# Patient Record
Sex: FEMALE | Race: WHITE | NOT HISPANIC OR LATINO | Employment: OTHER | URBAN - METROPOLITAN AREA
[De-identification: names, ages, dates, MRNs, and addresses within clinical notes are randomized per-mention and may not be internally consistent; named-entity substitution may affect disease eponyms.]

---

## 2017-02-06 ENCOUNTER — RESULTS ENCOUNTER (OUTPATIENT)
Dept: INTERNAL MEDICINE | Facility: CLINIC | Age: 67
End: 2017-02-06

## 2017-02-06 DIAGNOSIS — Z79.899 MEDICATION MANAGEMENT: Primary | ICD-10-CM

## 2017-02-06 DIAGNOSIS — Z00.00 ENCOUNTER FOR MEDICARE ANNUAL WELLNESS EXAM: ICD-10-CM

## 2017-02-06 DIAGNOSIS — Z79.899 MEDICATION MANAGEMENT: ICD-10-CM

## 2017-02-06 LAB
CHOLEST SERPL-MCNC: 254 MG/DL (ref 0–200)
HDLC SERPL-MCNC: 105 MG/DL (ref 40–60)
LDLC SERPL CALC-MCNC: 137 MG/DL (ref 0–100)
TRIGL SERPL-MCNC: 61 MG/DL (ref 0–150)
VLDLC SERPL CALC-MCNC: 12.2 MG/DL (ref 5–40)

## 2017-02-22 ENCOUNTER — OFFICE VISIT (OUTPATIENT)
Dept: INTERNAL MEDICINE | Facility: CLINIC | Age: 67
End: 2017-02-22

## 2017-02-22 VITALS
RESPIRATION RATE: 16 BRPM | WEIGHT: 158 LBS | HEART RATE: 60 BPM | SYSTOLIC BLOOD PRESSURE: 124 MMHG | DIASTOLIC BLOOD PRESSURE: 76 MMHG | HEIGHT: 68 IN | TEMPERATURE: 97.7 F | BODY MASS INDEX: 23.95 KG/M2

## 2017-02-22 VITALS
RESPIRATION RATE: 16 BRPM | HEIGHT: 68 IN | OXYGEN SATURATION: 97 % | WEIGHT: 158 LBS | SYSTOLIC BLOOD PRESSURE: 124 MMHG | HEART RATE: 60 BPM | TEMPERATURE: 97.7 F | DIASTOLIC BLOOD PRESSURE: 76 MMHG | BODY MASS INDEX: 23.95 KG/M2

## 2017-02-22 DIAGNOSIS — Z23 NEED FOR VACCINATION: ICD-10-CM

## 2017-02-22 DIAGNOSIS — M79.7 FIBROMYALGIA: ICD-10-CM

## 2017-02-22 DIAGNOSIS — Z00.00 MEDICARE ANNUAL WELLNESS VISIT, INITIAL: Primary | ICD-10-CM

## 2017-02-22 DIAGNOSIS — K21.9 GASTROESOPHAGEAL REFLUX DISEASE, ESOPHAGITIS PRESENCE NOT SPECIFIED: Primary | ICD-10-CM

## 2017-02-22 DIAGNOSIS — M81.0 OSTEOPOROSIS: ICD-10-CM

## 2017-02-22 DIAGNOSIS — M32.9 SLE (SYSTEMIC LUPUS ERYTHEMATOSUS) (HCC): ICD-10-CM

## 2017-02-22 DIAGNOSIS — Z11.59 NEED FOR HEPATITIS C SCREENING TEST: ICD-10-CM

## 2017-02-22 PROCEDURE — 90471 IMMUNIZATION ADMIN: CPT | Performed by: INTERNAL MEDICINE

## 2017-02-22 PROCEDURE — G0438 PPPS, INITIAL VISIT: HCPCS | Performed by: INTERNAL MEDICINE

## 2017-02-22 PROCEDURE — 99214 OFFICE O/P EST MOD 30 MIN: CPT | Performed by: INTERNAL MEDICINE

## 2017-02-22 PROCEDURE — 90670 PCV13 VACCINE IM: CPT | Performed by: INTERNAL MEDICINE

## 2017-02-22 RX ORDER — MULTIVIT-MIN/IRON/FOLIC ACID/K 18-600-40
1000 CAPSULE ORAL DAILY
COMMUNITY

## 2017-02-22 RX ORDER — NAPROXEN SODIUM 220 MG
220 TABLET ORAL 2 TIMES DAILY PRN
COMMUNITY
End: 2018-04-06

## 2017-02-22 NOTE — PROGRESS NOTES
Subjective   Melany Lloyd is a 66 y.o. female.     Chief Complaint   Patient presents with   • Fibromyalgia   • Lupus   • Osteoporosis         Fibromyalgia   This is a chronic problem. The current episode started more than 1 year ago. The problem occurs constantly. The problem has been unchanged. Associated symptoms include chest pain. Pertinent negatives include no abdominal pain, chills, coughing, fatigue, fever, nausea or vomiting. The treatment provided moderate relief.   Lupus   This is a chronic problem. The current episode started more than 1 year ago. The problem occurs constantly. The problem has been resolved. Associated symptoms include chest pain. Pertinent negatives include no abdominal pain, chills, coughing, fatigue, fever, nausea or vomiting. Nothing aggravates the symptoms. The treatment provided significant relief.   Osteoporosis   This is a chronic problem. The current episode started more than 1 year ago. The problem occurs constantly. The problem has been unchanged. Associated symptoms include chest pain. Pertinent negatives include no abdominal pain, chills, coughing, fatigue, fever, nausea or vomiting.        The following portions of the patient's history were reviewed and updated as appropriate: allergies, current medications, past social history and problem list.    Outpatient Prescriptions Marked as Taking for the 2/22/17 encounter (Office Visit) with Jeramie Kinsey MD   Medication Sig Dispense Refill   • acetaminophen (TYLENOL) 650 MG 8 hr tablet Take 650 mg by mouth every 8 (eight) hours as needed for mild pain (1-3).     • Cholecalciferol (VITAMIN D) 2000 UNITS capsule Take 1,000 Units by mouth Daily.     • cycloSPORINE (RESTASIS) 0.05 % ophthalmic emulsion 1 drop 2 (two) times a day.     • diclofenac (VOLTAREN) 1 % gel gel Apply 4 g topically as needed.     • Flaxseed, Linseed, (FLAX SEED OIL PO) Take 2 capsules by mouth daily.     • hydroxychloroquine (PLAQUENIL) 200 MG tablet Take  200 mg by mouth 2 (two) times a day.     • methocarbamol (ROBAXIN) 500 MG tablet Take 500 mg by mouth 4 (four) times a day as needed for muscle spasms (1-2 tablets 4 times daily).     • naproxen sodium (ALEVE) 220 MG tablet Take 220 mg by mouth 2 (Two) Times a Day As Needed for mild pain (1-3).     • Omega-3 Fatty Acids (FISH OIL PO) Take 1 tablet by mouth Daily.     • [DISCONTINUED] naproxen (NAPROSYN) 375 MG tablet Take 375 mg by mouth 2 (two) times a day.         Review of Systems   Constitutional: Negative for chills, fatigue and fever.   Respiratory: Positive for shortness of breath. Negative for cough and wheezing.    Cardiovascular: Positive for chest pain and palpitations. Negative for leg swelling.   Gastrointestinal: Negative for abdominal pain, constipation, diarrhea, nausea and vomiting.       Objective   Vitals:    02/22/17 0905   BP: 124/76   Pulse: 60   Resp: 16   Temp: 97.7 °F (36.5 °C)   SpO2: 97%      Last Weight    02/22/17  0905   Weight: 158 lb (71.7 kg)    [unfilled]  Body mass index is 24.02 kg/(m^2).      Physical Exam   Constitutional: She appears well-developed and well-nourished. No distress.   HENT:   Head: Normocephalic and atraumatic.   Neck: Carotid bruit is not present. No thyromegaly present.   Cardiovascular: Normal rate, regular rhythm, normal heart sounds and intact distal pulses.  Exam reveals no gallop.    No murmur heard.  Pulmonary/Chest: Effort normal and breath sounds normal. No respiratory distress. She has no wheezes. She has no rales.   Abdominal: Soft. Bowel sounds are normal. She exhibits no mass. There is no tenderness. There is no guarding.         Problem List Items Addressed This Visit        Digestive    GERD (gastroesophageal reflux disease) - Primary       Musculoskeletal and Integument    Osteoporosis    Fibromyalgia       Immune and Lymphatic    SLE (systemic lupus erythematosus)      Other Visit Diagnoses     Need for hepatitis C screening test             Assessment/Plan   Patient has had problems with nocturnal palpitations for a few seconds over the years.  Some associated shortness of breath with these.  Usually in bed or watching TV..  Short lived.  Usually just a few seconds.  She had an EKG and a stress echo both of which were normal.  O2 sat last visit was 97%.  Chest x-ray was normal.  She does have bibasilar rales about a quarter of the way up that are somewhat fibrotic.  This could easily be the cause of her dyspnea and that will need to be evaluated next.  We got a CT of the chest, final resolution which was normal.  Symptoms could be anxiety related.  Overall the symptoms are better and she would just as soon a thoracotomy at this point.  She's due for Prevnar this year.  She'll get a Pneumovax next year.  Due for Medicare wellness exam.  CT of chest did show a 1 cm splenic artery aneurysm.  We'll repeat a CT of the chest, no contrast, September 2017.  Lab work is done by rheumatology.  Lipids today are excellent.  Very high HDL cholesterol.  Check C antibody today.  Follow up one year.           Dragon disclaimer:   Much of this encounter note is an electronic transcription/translation of spoken language to printed text. The electronic translation of spoken language may permit erroneous, or at times, nonsensical words or phrases to be inadvertently transcribed; Although I have reviewed the note for such errors, some may still exist.

## 2017-02-22 NOTE — PROGRESS NOTES
QUICK REFERENCE INFORMATION:  The ABCs of the Annual Wellness Visit    Welcome to Medicare Visit    HEALTH RISK ASSESSMENT    Recent Hospitalizations:  No recent hospitalization(s)..      Current Medical Providers:  Patient Care Team:  Jeramie Kinsey MD as PCP - General (Internal Medicine)  No Known Provider as PCP - Family Medicine      Smoking Status:  History   Smoking Status   • Never Smoker   Smokeless Tobacco   • Not on file       Alcohol Consumption:  History   Alcohol use Not on file     Comment: Occasional.        Depression Screen:   PHQ-9 Depression Screening 2/22/2017   Little interest or pleasure in doing things 3   Feeling down, depressed, or hopeless 2   Trouble falling or staying asleep, or sleeping too much 1   Feeling tired or having little energy 1   Poor appetite or overeating 1   Feeling bad about yourself - or that you are a failure or have let yourself or your family down 1   Trouble concentrating on things, such as reading the newspaper or watching television 2   Moving or speaking so slowly that other people could have noticed. Or the opposite - being so fidgety or restless that you have been moving around a lot more than usual 0   Thoughts that you would be better off dead, or of hurting yourself in some way 0   PHQ-9 Total Score 11   If you checked off any problems, how difficult have these problems made it for you to do your work, take care of things at home, or get along with other people? Somewhat difficult       Health Habits and Functional and Cognitive Screening:  Functional & Cognitive Status 2/22/2017   Do you have difficulty preparing food and eating? Yes   Do you have difficulty bathing yourself? No   Do you have difficulty getting dressed? No   Do you have difficulty using the toilet? No   Do you have difficulty moving around from place to place? No   In the past year have you fallen or experienced a near fall? No   Do you need help using the phone?  No   Are you deaf or do you  have serious difficulty hearing?  No   Do you need help with transportation? No   Do you need help shopping? No   Do you need help preparing meals?  No   Do you need help with housework?  No   Do you need help with laundry? No   Do you need help taking your medications? No   Do you need help managing money? No   Do you have difficulty concentrating, remembering or making decisions? Yes                Does the patient have evidence of cognitive impairment? No    Asprin use counseling?no    Finger Rub Hearing Test (right ear):passed  Finger Rub Hearing Test (left ear):passed      Recent Lab Results:  CMP:     Lipid Panel:  Lab Results   Component Value Date    CHLPL 254 (H) 02/06/2017    TRIG 61 02/06/2017     (H) 02/06/2017    VLDL 12.2 02/06/2017     (H) 02/06/2017     LDL:     HbA1c:     Urine Microalbumin:     Visual Acuity:  No exam data present    Age-appropriate Screening Schedule:  Refer to the list below for future screening recommendations based on patient's age, sex and/or medical conditions. Orders for these recommended tests are listed in the plan section. The patient has been provided with a written plan.    Health Maintenance   Topic Date Due   • DXA SCAN  08/19/2016   • MAMMOGRAM  01/28/2018   • COLONOSCOPY  06/04/2019   • TDAP/TD VACCINES (2 - Td) 02/04/2026   • INFLUENZA VACCINE  Addressed   • ZOSTER VACCINE  Completed   • PAP SMEAR  Excluded        Subjective   History of Present Illness    Melany Lloyd is a 66 y.o. female an established patient presenting for a Welcome to Medicare Visit. In addition, we addressed the following health issues: none.    The following portions of the patient's history were reviewed and updated as appropriate: allergies, current medications, past family history, past medical history, past social history, past surgical history and problem list.    Outpatient Medications Prior to Visit   Medication Sig Dispense Refill   • acetaminophen (TYLENOL) 650 MG 8  hr tablet Take 650 mg by mouth every 8 (eight) hours as needed for mild pain (1-3).     • Cholecalciferol (VITAMIN D) 2000 UNITS capsule Take 1,000 Units by mouth Daily.     • cycloSPORINE (RESTASIS) 0.05 % ophthalmic emulsion 1 drop 2 (two) times a day.     • diclofenac (VOLTAREN) 1 % gel gel Apply 4 g topically as needed.     • Flaxseed, Linseed, (FLAX SEED OIL PO) Take 2 capsules by mouth daily.     • hydroxychloroquine (PLAQUENIL) 200 MG tablet Take 200 mg by mouth 2 (two) times a day.     • methocarbamol (ROBAXIN) 500 MG tablet Take 500 mg by mouth 4 (four) times a day as needed for muscle spasms (1-2 tablets 4 times daily).     • naproxen sodium (ALEVE) 220 MG tablet Take 220 mg by mouth 2 (Two) Times a Day As Needed for mild pain (1-3).     • Omega-3 Fatty Acids (FISH OIL PO) Take 1 tablet by mouth Daily.     • Calcium Citrate-Vitamin D (CALCITRATE/VITAMIN D PO) Take 1 tablet by mouth Daily.     • naproxen (NAPROSYN) 375 MG tablet Take 375 mg by mouth 2 (two) times a day.     • Polyethylene Glycol 3350 (MIRALAX PO) Take  by mouth as needed.     • tiZANidine (ZANAFLEX) 4 MG tablet Take 4 mg by mouth as needed for muscle spasms.       No facility-administered medications prior to visit.        Patient Active Problem List   Diagnosis   • GERD (gastroesophageal reflux disease)   • SLE (systemic lupus erythematosus)   • Osteoporosis   • DDD (degenerative disc disease), lumbosacral   • Precordial pain   • Dyspnea on exertion   • Fibromyalgia       Advanced Care Planning:  has an advanced directive - a copy HAS NOT been provided. Have asked the patient to send this to us to add to record    Identification of Risk Factors:  Risk factors include: chronic pain.    Review of Systems    Compared to one year ago, the patient feels her physical health is the same.  Compared to one year ago, the patient feels her mental health is better.    Objective    Physical Exam    Vitals:    02/22/17 1020   BP: 124/76   BP Location:  "Left arm   Patient Position: Sitting   Pulse: 60   Resp: 16   Temp: 97.7 °F (36.5 °C)   Weight: 158 lb (71.7 kg)   Height: 68\" (172.7 cm)   PainSc:   2       Body mass index is 24.02 kg/(m^2).  Discussed the patient's BMI with her. The BMI is in the acceptable range.    Procedure   Procedures       Assessment/Plan   Patient Self-Management and Personalized Health Advice    ·     Visit Diagnoses:    ICD-10-CM ICD-9-CM   1. Medicare annual wellness visit, initial Z00.00 V70.0       No orders of the defined types were placed in this encounter.      Outpatient Encounter Prescriptions as of 2/22/2017   Medication Sig Dispense Refill   • acetaminophen (TYLENOL) 650 MG 8 hr tablet Take 650 mg by mouth every 8 (eight) hours as needed for mild pain (1-3).     • Cholecalciferol (VITAMIN D) 2000 UNITS capsule Take 1,000 Units by mouth Daily.     • cycloSPORINE (RESTASIS) 0.05 % ophthalmic emulsion 1 drop 2 (two) times a day.     • diclofenac (VOLTAREN) 1 % gel gel Apply 4 g topically as needed.     • Flaxseed, Linseed, (FLAX SEED OIL PO) Take 2 capsules by mouth daily.     • hydroxychloroquine (PLAQUENIL) 200 MG tablet Take 200 mg by mouth 2 (two) times a day.     • methocarbamol (ROBAXIN) 500 MG tablet Take 500 mg by mouth 4 (four) times a day as needed for muscle spasms (1-2 tablets 4 times daily).     • naproxen sodium (ALEVE) 220 MG tablet Take 220 mg by mouth 2 (Two) Times a Day As Needed for mild pain (1-3).     • Omega-3 Fatty Acids (FISH OIL PO) Take 1 tablet by mouth Daily.     • [DISCONTINUED] Calcium Citrate-Vitamin D (CALCITRATE/VITAMIN D PO) Take 1 tablet by mouth Daily.     • [DISCONTINUED] naproxen (NAPROSYN) 375 MG tablet Take 375 mg by mouth 2 (two) times a day.     • [DISCONTINUED] Polyethylene Glycol 3350 (MIRALAX PO) Take  by mouth as needed.     • [DISCONTINUED] tiZANidine (ZANAFLEX) 4 MG tablet Take 4 mg by mouth as needed for muscle spasms.       No facility-administered encounter medications on file as " of 2/22/2017.        Reviewed use of high risk medication in the elderly: not applicable  Reviewed for potential of harmful drug interactions in the elderly: not applicable    Follow Up:  No Follow-up on file.     An After Visit Summary and PPPS with all of these plans were given to the patient.

## 2017-02-23 LAB — HCV AB S/CO SERPL IA: <0.1 S/CO RATIO (ref 0–0.9)

## 2017-09-12 ENCOUNTER — TELEPHONE (OUTPATIENT)
Dept: INTERNAL MEDICINE | Facility: CLINIC | Age: 67
End: 2017-09-12

## 2017-09-12 DIAGNOSIS — I72.8 SPLENIC ARTERY ANEURYSM (HCC): Primary | ICD-10-CM

## 2017-09-18 ENCOUNTER — HOSPITAL ENCOUNTER (OUTPATIENT)
Dept: CT IMAGING | Facility: HOSPITAL | Age: 67
Discharge: HOME | End: 2017-09-18
Attending: INTERNAL MEDICINE | Admitting: INTERNAL MEDICINE

## 2017-09-18 PROCEDURE — 71250 CT THORAX DX C-: CPT

## 2017-11-14 ENCOUNTER — TRANSCRIBE ORDERS (OUTPATIENT)
Dept: ADMINISTRATIVE | Facility: HOSPITAL | Age: 67
End: 2017-11-14

## 2017-11-14 DIAGNOSIS — M25.511 RIGHT SHOULDER PAIN, UNSPECIFIED CHRONICITY: Primary | ICD-10-CM

## 2017-11-28 ENCOUNTER — HOSPITAL ENCOUNTER (OUTPATIENT)
Dept: GENERAL RADIOLOGY | Facility: HOSPITAL | Age: 67
Discharge: HOME | End: 2017-11-28
Attending: ORTHOPAEDIC SURGERY | Admitting: ORTHOPAEDIC SURGERY

## 2017-11-28 ENCOUNTER — HOSPITAL ENCOUNTER (OUTPATIENT)
Dept: MRI IMAGING | Facility: HOSPITAL | Age: 67
Discharge: HOME | End: 2017-11-28
Attending: ORTHOPAEDIC SURGERY

## 2017-11-28 DIAGNOSIS — M25.511 RIGHT SHOULDER PAIN, UNSPECIFIED CHRONICITY: ICD-10-CM

## 2017-11-28 PROCEDURE — 77002 NEEDLE LOCALIZATION BY XRAY: CPT

## 2017-11-28 PROCEDURE — 73222 MRI JOINT UPR EXTREM W/DYE: CPT

## 2017-11-28 PROCEDURE — A9577 INJ MULTIHANCE: HCPCS | Performed by: RADIOLOGY

## 2017-11-28 PROCEDURE — 0 IOPAMIDOL 61 % SOLUTION: Performed by: RADIOLOGY

## 2017-11-28 PROCEDURE — 0 GADOBENATE DIMEGLUMINE 529 MG/ML SOLUTION: Performed by: RADIOLOGY

## 2017-11-28 RX ORDER — LIDOCAINE HYDROCHLORIDE 10 MG/ML
10 INJECTION, SOLUTION INFILTRATION; PERINEURAL ONCE
Status: COMPLETED | OUTPATIENT
Start: 2017-11-28 | End: 2017-11-28

## 2017-11-28 RX ADMIN — LIDOCAINE HYDROCHLORIDE 3 ML: 10 INJECTION, SOLUTION INFILTRATION; PERINEURAL at 09:19

## 2017-11-28 RX ADMIN — GADOBENATE DIMEGLUMINE 0.05 ML: 529 INJECTION, SOLUTION INTRAVENOUS at 09:19

## 2017-11-28 RX ADMIN — IOPAMIDOL 2 ML: 612 INJECTION, SOLUTION INTRAVENOUS at 09:19

## 2018-01-05 ENCOUNTER — HOSPITAL ENCOUNTER (OUTPATIENT)
Dept: CARDIOLOGY | Facility: HOSPITAL | Age: 68
Discharge: HOME | End: 2018-01-05
Attending: ORTHOPAEDIC SURGERY | Admitting: ORTHOPAEDIC SURGERY

## 2018-01-05 ENCOUNTER — HOSPITAL ENCOUNTER (OUTPATIENT)
Dept: GENERAL RADIOLOGY | Facility: HOSPITAL | Age: 68
Discharge: HOME | End: 2018-01-05
Attending: ORTHOPAEDIC SURGERY

## 2018-01-05 ENCOUNTER — LAB (OUTPATIENT)
Dept: LAB | Facility: HOSPITAL | Age: 68
End: 2018-01-05
Attending: ORTHOPAEDIC SURGERY

## 2018-01-05 ENCOUNTER — TRANSCRIBE ORDERS (OUTPATIENT)
Dept: ADMINISTRATIVE | Facility: HOSPITAL | Age: 68
End: 2018-01-05

## 2018-01-05 DIAGNOSIS — M25.511 RIGHT SHOULDER PAIN, UNSPECIFIED CHRONICITY: ICD-10-CM

## 2018-01-05 DIAGNOSIS — Z01.818 PRE-OP TESTING: ICD-10-CM

## 2018-01-05 DIAGNOSIS — Z01.818 PRE-OP TESTING: Primary | ICD-10-CM

## 2018-01-05 LAB
ANION GAP SERPL CALCULATED.3IONS-SCNC: 11.7 MMOL/L
BUN BLD-MCNC: 18 MG/DL (ref 8–23)
BUN/CREAT SERPL: 32.7 (ref 7–25)
CALCIUM SPEC-SCNC: 9.7 MG/DL (ref 8.6–10.5)
CHLORIDE SERPL-SCNC: 100 MMOL/L (ref 98–107)
CO2 SERPL-SCNC: 29.3 MMOL/L (ref 22–29)
CREAT BLD-MCNC: 0.55 MG/DL (ref 0.57–1)
DEPRECATED RDW RBC AUTO: 46.9 FL (ref 37–54)
ERYTHROCYTE [DISTWIDTH] IN BLOOD BY AUTOMATED COUNT: 13.6 % (ref 11.7–13)
GFR SERPL CREATININE-BSD FRML MDRD: 111 ML/MIN/1.73
GLUCOSE BLD-MCNC: 88 MG/DL (ref 65–99)
HCT VFR BLD AUTO: 40 % (ref 35.6–45.5)
HGB BLD-MCNC: 13.5 G/DL (ref 11.9–15.5)
MCH RBC QN AUTO: 31.2 PG (ref 26.9–32)
MCHC RBC AUTO-ENTMCNC: 33.8 G/DL (ref 32.4–36.3)
MCV RBC AUTO: 92.4 FL (ref 80.5–98.2)
PLATELET # BLD AUTO: 292 10*3/MM3 (ref 140–500)
PMV BLD AUTO: 9.1 FL (ref 6–12)
POTASSIUM BLD-SCNC: 4.8 MMOL/L (ref 3.5–5.2)
RBC # BLD AUTO: 4.33 10*6/MM3 (ref 3.9–5.2)
SODIUM BLD-SCNC: 141 MMOL/L (ref 136–145)
WBC NRBC COR # BLD: 5.08 10*3/MM3 (ref 4.5–10.7)

## 2018-01-05 PROCEDURE — 80048 BASIC METABOLIC PNL TOTAL CA: CPT

## 2018-01-05 PROCEDURE — 93010 ELECTROCARDIOGRAM REPORT: CPT | Performed by: INTERNAL MEDICINE

## 2018-01-05 PROCEDURE — 36415 COLL VENOUS BLD VENIPUNCTURE: CPT

## 2018-01-05 PROCEDURE — 85027 COMPLETE CBC AUTOMATED: CPT

## 2018-01-05 PROCEDURE — 71046 X-RAY EXAM CHEST 2 VIEWS: CPT

## 2018-01-05 PROCEDURE — 93005 ELECTROCARDIOGRAM TRACING: CPT | Performed by: ORTHOPAEDIC SURGERY

## 2018-01-19 ENCOUNTER — TELEPHONE (OUTPATIENT)
Dept: INTERNAL MEDICINE | Facility: CLINIC | Age: 68
End: 2018-01-19

## 2018-01-19 DIAGNOSIS — R91.1 NODULE OF RIGHT LUNG: ICD-10-CM

## 2018-01-19 DIAGNOSIS — I72.8 ANEURYSM OF SPLENIC ARTERY (HCC): Primary | ICD-10-CM

## 2018-02-05 ENCOUNTER — TREATMENT (OUTPATIENT)
Dept: PHYSICAL THERAPY | Facility: CLINIC | Age: 68
End: 2018-02-05

## 2018-02-05 ENCOUNTER — TRANSCRIBE ORDERS (OUTPATIENT)
Dept: PHYSICAL THERAPY | Facility: CLINIC | Age: 68
End: 2018-02-05

## 2018-02-05 DIAGNOSIS — M25.511 PAIN OF RIGHT SHOULDER REGION: ICD-10-CM

## 2018-02-05 DIAGNOSIS — Z98.890 S/P RIGHT ROTATOR CUFF REPAIR: Primary | ICD-10-CM

## 2018-02-05 DIAGNOSIS — Z98.890 S/P SHOULDER SURGERY: Primary | ICD-10-CM

## 2018-02-05 PROCEDURE — 97161 PT EVAL LOW COMPLEX 20 MIN: CPT | Performed by: PHYSICAL THERAPIST

## 2018-02-05 PROCEDURE — 97110 THERAPEUTIC EXERCISES: CPT | Performed by: PHYSICAL THERAPIST

## 2018-02-05 PROCEDURE — G8985 CARRY GOAL STATUS: HCPCS | Performed by: PHYSICAL THERAPIST

## 2018-02-05 PROCEDURE — G8984 CARRY CURRENT STATUS: HCPCS | Performed by: PHYSICAL THERAPIST

## 2018-02-05 NOTE — PROGRESS NOTES
Physical Therapy Initial Evaluation and Plan of Care    Patient: Melany Lloyd   : 1950  Diagnosis/ICD-10 Code:  S/P right rotator cuff repair [Z98.890]  Referring practitioner: Romeo Walker MD    Subjective Evaluation    History of Present Illness  Date of surgery: 2018  Mechanism of injury: (R) Shoulder RTC Repair by Romeo Walker MD.  Came out of the sling last week but still sleep with the sling on.  Doing well since Sx but haven't been     Injured the (R) shdr throwing a ball for the dog over time in the last year ().        Procedures:   1. Arthoscopic Repair of (R) RTC  2. Acromioplasty  3. Thierry  4. Debridement of labral tears, anterior and posterior and release of the long head of the biceps  5. Bone Graft      Occupation:  Retired - PTA  Activities:  Dog, Housework, Gardening, walk 3x wk, watch 25 lb grandson 1-2 days a wk  PLOF: Independent  Medical Hx Reviewed.      Quality of life: good    Pain  Current pain rating: 3  At best pain ratin  At worst pain ratin  Location: (R) Shoulder   Quality: dull ache  Relieving factors: medications, ice and support  Aggravating factors: sleeping (Pt on restrictions for lifting no heavier then a penicl )  Progression: improved    Social Support  Lives in: multiple-level home  Lives with: spouse    Hand dominance: right             Objective       Passive Range of Motion     Right Shoulder   Flexion: 50 degrees   Abduction: 59 degrees          Assessment & Plan     Assessment  Impairments: abnormal or restricted ROM, impaired physical strength and pain with function  Assessment details: Pt presents to PT with symptoms consistent with (R) Rotator Cuff Repair (Listed procedure in Subjective).  Pt would benefit from skilled PT intervention to address the deficits noted.   Prognosis: good  Functional Limitations: lifting, sleeping, pushing, uncomfortable because of pain, reaching behind back and reaching overhead  Goals  Plan Goals:  SHORT TERM GOALS: 4-8 weeks  1. Patient to be compliant with HEP and no diff. with sleeping  2. Increased (R) UE strength to 4-/5 with no pain> 4/10 to allow for household and work activities.   3. Pt to exhibit (R) shoulder active flexion / ABD to 135° in standing/sitting to assist with reaching overhead with less pain  4. Pt demonstrates improved posture in sitting and standing with minimal-no cues during treatment session    LONG TERM GOALS: 3-4 months  1. Pt score <35% perceived disability on DASH   2. Pt. to exhibit (R) shoulder AROM to WFL (> 160° flex/abd. to allow for reaching overhead and behind back without pain limiting function  3. Pt to exhibit 4+/5 UE strength to allow for pushing/pulling and lifting >5 #to occur with pain <2/10  4. Pt able to reach overhead and lift 10# (B) x 10 to allow for return to doing work around home.       Plan  Therapy options: will be seen for skilled physical therapy services  Planned modality interventions: cryotherapy, electrical stimulation/Russian stimulation, iontophoresis, TENS, thermotherapy (hydrocollator packs) and ultrasound  Other planned modality interventions: Dry Needling  Planned therapy interventions: abdominal trunk stabilization, ADL retraining, flexibility, body mechanics training, home exercise program, functional ROM exercises, joint mobilization, manual therapy, neuromuscular re-education, postural training, soft tissue mobilization, spinal/joint mobilization, strengthening, stretching and therapeutic activities  Frequency: 1-3x.  Duration in visits: 20  Treatment plan discussed with: patient        Manual Therapy:         mins  78356;  Therapeutic Exercise:    10     mins  04616;     Neuromuscular Wm:        mins  66781;    Therapeutic Activity:          mins  29074;     Gait Training:           mins  29113;     Ultrasound:          mins  69489;    Electrical Stimulation:         mins  60257 ( );  Dry Needling          mins self-pay    Timed  Treatment:   10   mins   Total Treatment:     60   mins    PT SIGNATURE: Fede Winter, MISAEL   [place] Lic #[id number]    DATE TREATMENT INITIATED: 2/5/2018    Medicare Initial Certification   Certification Period: 5/6/2018  I certify that the therapy services are furnished while this patient is under my care.  The services outlined above are required by this patient, and will be reviewed every 90 days.     PHYSICIAN: Romeo Walker MD      DATE:     Please sign and return via fax to [phone].. Thank you, [facility].

## 2018-02-08 ENCOUNTER — TREATMENT (OUTPATIENT)
Dept: PHYSICAL THERAPY | Facility: CLINIC | Age: 68
End: 2018-02-08

## 2018-02-08 DIAGNOSIS — Z98.890 S/P RIGHT ROTATOR CUFF REPAIR: Primary | ICD-10-CM

## 2018-02-08 DIAGNOSIS — M25.511 PAIN OF RIGHT SHOULDER REGION: ICD-10-CM

## 2018-02-08 PROCEDURE — 97140 MANUAL THERAPY 1/> REGIONS: CPT | Performed by: PHYSICAL THERAPIST

## 2018-02-08 PROCEDURE — 97110 THERAPEUTIC EXERCISES: CPT | Performed by: PHYSICAL THERAPIST

## 2018-02-08 NOTE — PROGRESS NOTES
Physical Therapy Daily Progress Note  Visit: 2    Melany Lloyd reports: My shdr is better and has felt better since the 1st visit.  My (R) forearm is sore from the ball squeeze.  (Pt's  attended PT with the pt.)    Subjective     Objective       Passive Range of Motion   Left Shoulder   Flexion: 95 degrees      See Exercise, Manual, and Modality Logs for complete treatment.     Reviewed HEP.    Pt's  pulled the PT (myself) aside in to private area and questioned if the PT was comfortable treating Dr. Romeo Walker' patients.  He was informed that there was no issue or concern.      Assessment & Plan     Assessment  Assessment details: Pt demos (I) w/ HEP.  Review of all HEP with some VC's for correction.  Pt yasmeen the MT provided Rx well.      Plan  Plan details: Progress ROM / strengthening / stabilization / functional activity as tolerated and appropriate for the pt's Sx rehab.                   Manual Therapy:    12     mins  10948;  Therapeutic Exercise:    13     mins  14067;     Neuromuscular Wm:        mins  23704;    Therapeutic Activity:          mins  18214;     Gait Training:           mins  68542;     Ultrasound:          mins  68789;    Electrical Stimulation:         mins  09884 ( );  Dry Needling          mins self-pay    Timed Treatment:   25   mins   Total Treatment:     25   mins    Fede Winter PT  [place] Lic. # [id number]  Physical Therapist

## 2018-02-12 ENCOUNTER — TREATMENT (OUTPATIENT)
Dept: PHYSICAL THERAPY | Facility: CLINIC | Age: 68
End: 2018-02-12

## 2018-02-12 DIAGNOSIS — M25.511 PAIN OF RIGHT SHOULDER REGION: ICD-10-CM

## 2018-02-12 DIAGNOSIS — Z98.890 S/P RIGHT ROTATOR CUFF REPAIR: Primary | ICD-10-CM

## 2018-02-12 PROCEDURE — 97110 THERAPEUTIC EXERCISES: CPT | Performed by: PHYSICAL THERAPIST

## 2018-02-12 NOTE — PROGRESS NOTES
Physical Therapy Daily Progress Note  Visit: 3    Melany Lloyd reports: My R shdr is sore.  I tried to sleep in my bed last night with no wedge in my sling.    Subjective     Objective   See Exercise, Manual, and Modality Logs for complete treatment.     Reviewed sleeping on a incline w/ sling to help the shdr not be irritated with sleeping.    Assessment & Plan     Assessment  Assessment details: PT was sore with new exercises but yasmeen Rx well while in the clinic.  Instructed to only do new exercise 1 more time today and progress to 2x tomorrow.      Plan  Plan details: Progress ROM / strengthening /stabilization / functional activity as tolerated                   Manual Therapy:         mins  60668;  Therapeutic Exercise:    30     mins  44690;     Neuromuscular Wm:        mins  10838;    Therapeutic Activity:          mins  80165;     Gait Training:           mins  29726;     Ultrasound:          mins  58034;    Electrical Stimulation:         mins  97543 ( );  Dry Needling          mins self-pay    Timed Treatment:   30   mins   Total Treatment:     30   mins    Fede Winter PT  [place] Lic. # [id number]  Physical Therapist

## 2018-02-14 ENCOUNTER — OFFICE VISIT (OUTPATIENT)
Dept: INTERNAL MEDICINE | Facility: CLINIC | Age: 68
End: 2018-02-14

## 2018-02-14 DIAGNOSIS — Z79.899 MEDICATION MANAGEMENT: Primary | ICD-10-CM

## 2018-02-14 LAB
CHOLEST SERPL-MCNC: 241 MG/DL (ref 0–200)
HDLC SERPL-MCNC: 100 MG/DL (ref 40–60)
LDLC SERPL CALC-MCNC: 132 MG/DL (ref 0–100)
TRIGL SERPL-MCNC: 47 MG/DL (ref 0–150)
VLDLC SERPL CALC-MCNC: 9.4 MG/DL (ref 5–40)

## 2018-02-15 ENCOUNTER — TREATMENT (OUTPATIENT)
Dept: PHYSICAL THERAPY | Facility: CLINIC | Age: 68
End: 2018-02-15

## 2018-02-15 DIAGNOSIS — M25.511 PAIN OF RIGHT SHOULDER REGION: ICD-10-CM

## 2018-02-15 DIAGNOSIS — Z98.890 S/P RIGHT ROTATOR CUFF REPAIR: Primary | ICD-10-CM

## 2018-02-15 PROCEDURE — 97110 THERAPEUTIC EXERCISES: CPT | Performed by: PHYSICAL THERAPIST

## 2018-02-15 PROCEDURE — 97140 MANUAL THERAPY 1/> REGIONS: CPT | Performed by: PHYSICAL THERAPIST

## 2018-02-15 NOTE — PROGRESS NOTES
Physical Therapy Daily Progress Note  Visit: 4    Melany Lloyd reports: I am sore and hurting this morning because I had difficult sleeping last night because I had difficult getting my (R) shdr comfortable.  The HEP is going well, no issues with the HEP.  I am able to do 5 mins on the pulleys now.         Subjective     Objective   See Exercise, Manual, and Modality Logs for complete treatment.       Assessment & Plan     Assessment  Assessment details: Pt is demo an improved yasmeen for the shdr flex w/ the wand today.  The pt is yasmeen new exercises well.     Plan  Plan details: Progress per Plan of Care                   Manual Therapy:    10     mins  07230;  Therapeutic Exercise:    33     mins  48517;     Neuromuscular Wm:        mins  57971;    Therapeutic Activity:          mins  56741;     Gait Training:           mins  41356;     Ultrasound:          mins  92288;    Electrical Stimulation:         mins  70282 ( );  Dry Needling          mins self-pay    Timed Treatment:   43   mins   Total Treatment:     43   mins    Fede Winter PT  [place] Lic. # [id number]  Physical Therapist

## 2018-02-19 ENCOUNTER — TREATMENT (OUTPATIENT)
Dept: PHYSICAL THERAPY | Facility: CLINIC | Age: 68
End: 2018-02-19

## 2018-02-19 DIAGNOSIS — Z98.890 S/P RIGHT ROTATOR CUFF REPAIR: Primary | ICD-10-CM

## 2018-02-19 DIAGNOSIS — M25.511 PAIN OF RIGHT SHOULDER REGION: ICD-10-CM

## 2018-02-19 PROCEDURE — 97110 THERAPEUTIC EXERCISES: CPT | Performed by: PHYSICAL THERAPIST

## 2018-02-19 PROCEDURE — 97140 MANUAL THERAPY 1/> REGIONS: CPT | Performed by: PHYSICAL THERAPIST

## 2018-02-19 NOTE — PROGRESS NOTES
Physical Therapy Daily Progress Note  Visit: 5    Melany Lloyd reports: My (R) shdr is hurting today.  I did not sleep well last night and we traveled this weekend.  I am now sleeping without the sling at night.      Subjective     Objective   See Exercise, Manual, and Modality Logs for complete treatment.       Assessment & Plan     Assessment  Assessment details: Despite reports of ache and pain today the pt had an improved tolerance for MT.  She demo'd increased (R) shdr PROM with MS.    Plan  Plan details: Progress ROM / strengthening / stabilization / functional activity as tolerated                   Manual Therapy:    13     mins  76255;  Therapeutic Exercise:    33     mins  08295;     Neuromuscular Wm:        mins  81447;    Therapeutic Activity:          mins  83278;     Gait Training:           mins  57392;     Ultrasound:          mins  25998;    Electrical Stimulation:         mins  58786 ( );  Dry Needling          mins self-pay    Timed Treatment:   46   mins   Total Treatment:     46   mins    Fede Winter PT  [place] Lic. # [id number]  Physical Therapist

## 2018-02-21 ENCOUNTER — OFFICE VISIT (OUTPATIENT)
Dept: INTERNAL MEDICINE | Facility: CLINIC | Age: 68
End: 2018-02-21

## 2018-02-21 VITALS
HEIGHT: 68 IN | TEMPERATURE: 97.9 F | SYSTOLIC BLOOD PRESSURE: 128 MMHG | WEIGHT: 164 LBS | BODY MASS INDEX: 24.86 KG/M2 | RESPIRATION RATE: 16 BRPM | OXYGEN SATURATION: 95 % | HEART RATE: 68 BPM | DIASTOLIC BLOOD PRESSURE: 78 MMHG

## 2018-02-21 DIAGNOSIS — M79.7 FIBROMYALGIA: Primary | ICD-10-CM

## 2018-02-21 DIAGNOSIS — K21.9 GASTROESOPHAGEAL REFLUX DISEASE, ESOPHAGITIS PRESENCE NOT SPECIFIED: ICD-10-CM

## 2018-02-21 DIAGNOSIS — M81.0 OSTEOPOROSIS, UNSPECIFIED OSTEOPOROSIS TYPE, UNSPECIFIED PATHOLOGICAL FRACTURE PRESENCE: ICD-10-CM

## 2018-02-21 PROCEDURE — 99214 OFFICE O/P EST MOD 30 MIN: CPT | Performed by: INTERNAL MEDICINE

## 2018-02-21 NOTE — PROGRESS NOTES
Subjective   Melany Lloyd is a 67 y.o. female.     Chief Complaint   Patient presents with   • Fibromyalgia   • Osteoporosis         Fibromyalgia   This is a chronic problem. The current episode started more than 1 year ago. The problem occurs constantly. The problem has been unchanged. Associated symptoms include chest pain. Pertinent negatives include no abdominal pain, chills, coughing, fatigue, fever, nausea or vomiting. The treatment provided moderate relief.   Osteoporosis   This is a chronic problem. The current episode started more than 1 year ago. The problem occurs constantly. The problem has been unchanged. Associated symptoms include chest pain. Pertinent negatives include no abdominal pain, chills, coughing, fatigue, fever, nausea or vomiting.   Lupus   This is a chronic problem. The current episode started more than 1 year ago. The problem occurs constantly. The problem has been resolved. Associated symptoms include chest pain. Pertinent negatives include no abdominal pain, chills, coughing, fatigue, fever, nausea or vomiting. Nothing aggravates the symptoms. The treatment provided significant relief.        The following portions of the patient's history were reviewed and updated as appropriate: allergies, current medications, past social history and problem list.    Outpatient Prescriptions Marked as Taking for the 2/21/18 encounter (Office Visit) with Jeramie Kinsey MD   Medication Sig Dispense Refill   • Cholecalciferol (VITAMIN D) 2000 UNITS capsule Take 1,000 Units by mouth Daily.     • Flaxseed, Linseed, (FLAX SEED OIL PO) Take 2 capsules by mouth daily.     • hydroxychloroquine (PLAQUENIL) 200 MG tablet Take 200 mg by mouth 2 (two) times a day.     • Omega-3 Fatty Acids (FISH OIL PO) Take 1 tablet by mouth Daily.         Review of Systems   Constitutional: Negative for chills, fatigue and fever.   Respiratory: Positive for shortness of breath. Negative for cough and wheezing.     Cardiovascular: Positive for chest pain and palpitations. Negative for leg swelling.   Gastrointestinal: Negative for abdominal pain, constipation, diarrhea, nausea and vomiting.       Objective   Vitals:    02/21/18 0904   Resp: 16      There were no vitals filed for this visit. [unfilled]  There is no height or weight on file to calculate BMI.      Physical Exam   Constitutional: She appears well-developed and well-nourished. No distress.   HENT:   Head: Normocephalic and atraumatic.   Neck: Carotid bruit is not present. No thyromegaly present.   Cardiovascular: Normal rate, regular rhythm, normal heart sounds and intact distal pulses.  Exam reveals no gallop.    No murmur heard.  Pulmonary/Chest: Effort normal and breath sounds normal. No respiratory distress. She has no wheezes. She has no rales.   Abdominal: Soft. Bowel sounds are normal. She exhibits no mass. There is no tenderness. There is no guarding.         Problem List Items Addressed This Visit        Digestive    GERD (gastroesophageal reflux disease)       Musculoskeletal and Integument    Osteoporosis       Other    Fibromyalgia - Primary        Assessment/Plan   Patient has had problems with nocturnal palpitations for a few seconds over the years.  GERD is great.  OP is not under treatment.  Fibromyalgia is doing pretty well.  Some associated shortness of breath with these.  Usually in bed or watching TV..  Short lived.  Usually just a few seconds.  She had an EKG and a stress echo both of which were normal.  Chest x-ray was normal.  She does have bibasilar rales about a quarter of the way up that are somewhat fibrotic.  We got a CT of the chest, final resolution which was normal.  Symptoms could be anxiety related.  We'll repeat a CT of the chest, no contrast, September 2018 to follow-up her splenic artery aneurysm and left upper lobe nodule.  Lab work is done by rheumatology.  Lipids today are excellent.  Very high HDL cholesterol.  She'll  plan to get a DEXA scan and consider prolia with her rheumatologist this year.  Been about 4 years since her last DEXA scan.  She is also due for mammogram.  She'll be back for her Pneumovax and annual wellness visit since she is a day early today.  Follow up one year.  She's had some trivial abnormalities of her ALT on the last few labs [facility] that are being followed.  The good news is her liver has been imaged several times and is fine.           Dragon disclaimer:   Much of this encounter note is an electronic transcription/translation of spoken language to printed text. The electronic translation of spoken language may permit erroneous, or at times, nonsensical words or phrases to be inadvertently transcribed; Although I have reviewed the note for such errors, some may still exist.

## 2018-02-22 ENCOUNTER — TREATMENT (OUTPATIENT)
Dept: PHYSICAL THERAPY | Facility: CLINIC | Age: 68
End: 2018-02-22

## 2018-02-22 ENCOUNTER — CLINICAL SUPPORT (OUTPATIENT)
Dept: INTERNAL MEDICINE | Facility: CLINIC | Age: 68
End: 2018-02-22

## 2018-02-22 DIAGNOSIS — M25.511 PAIN OF RIGHT SHOULDER REGION: ICD-10-CM

## 2018-02-22 DIAGNOSIS — Z98.890 S/P RIGHT ROTATOR CUFF REPAIR: Primary | ICD-10-CM

## 2018-02-22 PROCEDURE — 97140 MANUAL THERAPY 1/> REGIONS: CPT | Performed by: PHYSICAL THERAPIST

## 2018-02-22 PROCEDURE — 90732 PPSV23 VACC 2 YRS+ SUBQ/IM: CPT | Performed by: INTERNAL MEDICINE

## 2018-02-22 PROCEDURE — 97110 THERAPEUTIC EXERCISES: CPT | Performed by: PHYSICAL THERAPIST

## 2018-02-22 PROCEDURE — G0009 ADMIN PNEUMOCOCCAL VACCINE: HCPCS | Performed by: INTERNAL MEDICINE

## 2018-02-22 NOTE — PATIENT INSTRUCTIONS
Pneumococcal Polysaccharide Vaccine: What You Need to Know  1. Why get vaccinated?  Vaccination can protect older adults (and some children and younger adults) from pneumococcal disease.  Pneumococcal disease is caused by bacteria that can spread from person to person through close contact. It can cause ear infections, and it can also lead to more serious infections of the:  · Lungs (pneumonia),  · Blood (bacteremia), and  · Covering of the brain and spinal cord (meningitis). Meningitis can cause deafness and brain damage, and it can be fatal.  Anyone can get pneumococcal disease, but children under 2 years of age, people with certain medical conditions, adults over 65 years of age, and cigarette smokers are at the highest risk.  About 18,000 older adults die each year from pneumococcal disease in the [place].  Treatment of pneumococcal infections with penicillin and other drugs used to be more effective. But some strains of the disease have become resistant to these drugs. This makes prevention of the disease, through vaccination, even more important.  2. Pneumococcal polysaccharide vaccine (PPSV23)  Pneumococcal polysaccharide vaccine (PPSV23) protects against 23 types of pneumococcal bacteria. It will not prevent all pneumococcal disease.  PPSV23 is recommended for:  · All adults 65 years of age and older,  · Anyone 2 through 64 years of age with certain long-term health problems,  · Anyone 2 through 64 years of age with a weakened immune system,  · Adults 19 through 64 years of age who smoke cigarettes or have asthma.  Most people need only one dose of PPSV. A second dose is recommended for certain high-risk groups. People 65 and older should get a dose even if they have gotten one or more doses of the vaccine before they turned 65.  Your healthcare provider can give you more information about these recommendations.  Most healthy adults develop protection within 2 to 3 weeks of getting the shot.  3. Some  people should not get this vaccine  · Anyone who has had a life-threatening allergic reaction to PPSV should not get another dose.  · Anyone who has a severe allergy to any component of PPSV should not receive it. Tell your provider if you have any severe allergies.  · Anyone who is moderately or severely ill when the shot is scheduled may be asked to wait until they recover before getting the vaccine. Someone with a mild illness can usually be vaccinated.  · Children less than 2 years of age should not receive this vaccine.  · There is no evidence that PPSV is harmful to either a pregnant woman or to her fetus. However, as a precaution, women who need the vaccine should be vaccinated before becoming pregnant, if possible.  4. Risks of a vaccine reaction  With any medicine, including vaccines, there is a chance of side effects. These are usually mild and go away on their own, but serious reactions are also possible.  About half of people who get PPSV have mild side effects, such as redness or pain where the shot is given, which go away within about two days.  Less than 1 out of 100 people develop a fever, muscle aches, or more severe local reactions.  Problems that could happen after any vaccine:   · People sometimes faint after a medical procedure, including vaccination. Sitting or lying down for about 15 minutes can help prevent fainting, and injuries caused by a fall. Tell your doctor if you feel dizzy, or have vision changes or ringing in the ears.  · Some people get severe pain in the shoulder and have difficulty moving the arm where a shot was given. This happens very rarely.  · Any medication can cause a severe allergic reaction. Such reactions from a vaccine are very rare, estimated at about 1 in a million doses, and would happen within a few minutes to a few hours after the vaccination.  As with any medicine, there is a very remote chance of a vaccine causing a serious injury or death.  The safety of  vaccines is always being monitored. For more information, visit: www.cdc.gov/vaccinesafety/  5. What if there is a serious reaction?  What should I look for?   Look for anything that concerns you, such as signs of a severe allergic reaction, very high fever, or unusual behavior.  Signs of a severe allergic reaction can include hives, swelling of the face and throat, difficulty breathing, a fast heartbeat, dizziness, and weakness. These would usually start a few minutes to a few hours after the vaccination.  What should I do?   If you think it is a severe allergic reaction or other emergency that can't wait, call 9-1-1 or get to the nearest hospital. Otherwise, call your doctor.  Afterward, the reaction should be reported to the Vaccine Adverse Event Reporting System (VAERS). Your doctor might file this report, or you can do it yourself through the VAERS web site at www.vaers.[employer].gov, or by calling 1-883.270.2206.  VAERS does not give medical advice.   6. How can I learn more?  · Ask your doctor. He or she can give you the vaccine package insert or suggest other sources of information.  · Call your local or state health department.  · Contact the Centers for Disease Control and Prevention (CDC):  ¨ Call 1-912.677.6022 (5-509-RBY-INFO) or  ¨ Visit CDC's website at www.cdc.gov/vaccines  CDC Pneumococcal Polysaccharide Vaccine VIS (4/24/15)  This information is not intended to replace advice given to you by your health care provider. Make sure you discuss any questions you have with your health care provider.  Document Released: 10/15/2007 Document Revised: 09/07/2017 Document Reviewed: 09/07/2017  Elsevier Interactive Patient Education © 2017 Elsevier Inc.

## 2018-02-22 NOTE — PROGRESS NOTES
Physical Therapy Daily Progress Note  Visit: 6    Melany Lloyd reports: My (R) shdr is doing better today.  I took a pain pill last night and it helped me sleep without pain last night, and I am not hurting this morning.    Subjective     Objective   See Exercise, Manual, and Modality Logs for complete treatment.       Assessment & Plan     Assessment  Assessment details: Pt continues to improve with the (R) shdr, demo by the increased exercise yasmeen and increased ROM demo'd w/ the wand AAROM.    Plan  Plan details: Re-Eval for return to MD on Tuesday.                 Manual Therapy:    10     mins  14062;  Therapeutic Exercise:    35     mins  40830;     Neuromuscular Wm:        mins  44339;    Therapeutic Activity:          mins  57190;     Gait Training:           mins  74853;     Ultrasound:          mins  63321;    Electrical Stimulation:         mins  17315 ( );  Dry Needling          mins self-pay    Timed Treatment:   45   mins   Total Treatment:     45   mins    Fede Winter PT  [place] Lic. # [id number]  Physical Therapist

## 2018-02-23 ENCOUNTER — OFFICE VISIT (OUTPATIENT)
Dept: INTERNAL MEDICINE | Facility: CLINIC | Age: 68
End: 2018-02-23

## 2018-02-23 DIAGNOSIS — Z00.00 WELCOME TO MEDICARE PREVENTIVE VISIT: Primary | ICD-10-CM

## 2018-02-23 PROCEDURE — G0439 PPPS, SUBSEQ VISIT: HCPCS | Performed by: INTERNAL MEDICINE

## 2018-02-23 NOTE — PROGRESS NOTES
QUICK REFERENCE INFORMATION:  The ABCs of the Annual Wellness Visit    Welcome to Medicare Visit    HEALTH RISK ASSESSMENT    1950    Recent Hospitalizations:  No hospitalization(s) within the last year..      Current Medical Providers:  Patient Care Team:  Jeramie Kinsey MD as PCP - General (Internal Medicine)  No Known Provider as PCP - Family Medicine  Jeramie Kinsey MD as PCP - Claims Attributed  Jeramie Kinsey MD as PCP - Internal Medicine (Internal Medicine)  Romeo Walker MD as Consulting Physician (Orthopedic Surgery)      Smoking Status:  History   Smoking Status   • Never Smoker   Smokeless Tobacco   • Never Used       Alcohol Consumption:  History   Alcohol Use   • Yes     Comment: Occasional.        Depression Screen:   PHQ-2/PHQ-9 Depression Screening 2/23/2018   Little interest or pleasure in doing things 3   Feeling down, depressed, or hopeless 0   Trouble falling or staying asleep, or sleeping too much 1   Feeling tired or having little energy 1   Poor appetite or overeating 1   Feeling bad about yourself - or that you are a failure or have let yourself or your family down 0   Trouble concentrating on things, such as reading the newspaper or watching television 0   Moving or speaking so slowly that other people could have noticed. Or the opposite - being so fidgety or restless that you have been moving around a lot more than usual 0   Thoughts that you would be better off dead, or of hurting yourself in some way 0   Total Score 6   If you checked off any problems, how difficult have these problems made it for you to do your work, take care of things at home, or get along with other people? Somewhat difficult       Health Habits and Functional and Cognitive Screening:  Functional & Cognitive Status 2/23/2018   Do you have difficulty preparing food and eating? No   Do you have difficulty bathing yourself, getting dressed or grooming yourself? No   Do you have difficulty using the toilet? No    Do you have difficulty moving around from place to place? No   Do you have trouble with steps or getting out of a bed or a chair? No   In the past year have you fallen or experienced a near fall? No   Current Diet Well Balanced Diet   Dental Exam Up to date   Eye Exam Up to date   Do you need help using the phone?  No   Are you deaf or do you have serious difficulty hearing?  No   Do you need help with transportation? No   Do you need help shopping? No   Do you need help preparing meals?  No   Do you need help with housework?  No   Do you need help with laundry? No   Do you need help taking your medications? No   Do you need help managing money? No   Have you felt unusual stress, anger or loneliness in the last month? No   Who do you live with? Spouse   If you need help, do you have trouble finding someone available to you? No   Have you been bothered in the last four weeks by sexual problems? No   Do you have difficulty concentrating, remembering or making decisions? No           Does the patient have evidence of cognitive impairment? No    Aspirin use counseling? Does not need ASA (and currently is not on it)      Recent Lab Results:  CMP:  Lab Results   Component Value Date    BUN 18 01/05/2018    CREATININE 0.55 (L) 01/05/2018    EGFRIFNONA 111 01/05/2018    BCR 32.7 (H) 01/05/2018     01/05/2018    K 4.8 01/05/2018    CO2 29.3 (H) 01/05/2018    CALCIUM 9.7 01/05/2018     Lipid Panel:  Lab Results   Component Value Date    TRIG 47 02/14/2018     (H) 02/14/2018    VLDL 9.4 02/14/2018     HbA1c:       Visual Acuity:  No exam data present    Age-appropriate Screening Schedule:  Refer to the list below for future screening recommendations based on patient's age, sex and/or medical conditions. Orders for these recommended tests are listed in the plan section. The patient has been provided with a written plan.    Health Maintenance   Topic Date Due   • DXA SCAN  08/19/2016   • MAMMOGRAM  01/28/2018   •  COLONOSCOPY  06/04/2021   • TDAP/TD VACCINES (2 - Td) 02/04/2026   • INFLUENZA VACCINE  Completed   • PNEUMOCOCCAL VACCINES (65+ LOW/MEDIUM RISK)  Completed   • ZOSTER VACCINE  Completed   • PAP SMEAR  Excluded        Subjective   History of Present Illness    Melany Lloyd is a 67 y.o. female an established patient presenting for a Welcome to Medicare Visit.     The following portions of the patient's history were reviewed and updated as appropriate: allergies, current medications, past family history, past medical history, past social history, past surgical history and problem list.    Outpatient Medications Prior to Visit   Medication Sig Dispense Refill   • acetaminophen (TYLENOL) 650 MG 8 hr tablet Take 650 mg by mouth every 8 (eight) hours as needed for mild pain (1-3).     • Cholecalciferol (VITAMIN D) 2000 UNITS capsule Take 1,000 Units by mouth Daily.     • cycloSPORINE (RESTASIS) 0.05 % ophthalmic emulsion 1 drop 2 (two) times a day.     • diclofenac (VOLTAREN) 1 % gel gel Apply 4 g topically as needed.     • Flaxseed, Linseed, (FLAX SEED OIL PO) Take 2 capsules by mouth daily.     • hydroxychloroquine (PLAQUENIL) 200 MG tablet Take 200 mg by mouth 2 (two) times a day.     • methocarbamol (ROBAXIN) 500 MG tablet Take 500 mg by mouth 4 (four) times a day as needed for muscle spasms (1-2 tablets 4 times daily).     • naproxen sodium (ALEVE) 220 MG tablet Take 220 mg by mouth 2 (Two) Times a Day As Needed for mild pain (1-3).     • Omega-3 Fatty Acids (FISH OIL PO) Take 1 tablet by mouth Daily.       No facility-administered medications prior to visit.        Patient Active Problem List   Diagnosis   • GERD (gastroesophageal reflux disease)   • SLE (systemic lupus erythematosus)   • Osteoporosis   • DDD (degenerative disc disease), lumbosacral   • Dyspnea on exertion   • Fibromyalgia       Advance Care Planning:  has an advance directive - a copy HAS NOT been provided    Identification of Risk  Factors:  Risk factors include: chronic pain.    Review of Systems    Compared to one year ago, the patient feels her physical health is the same.  Compared to one year ago, the patient feels her mental health is better.    Objective    Physical Exam    Vitals:    02/23/18 1125   PainSc: 0-No pain       There is no height or weight on file to calculate BMI.  Discussed the patient's BMI with her. BMI is within normal parameters. No follow-up required.    Procedure   Procedures       Assessment/Plan   Patient Self-Management and Personalized Health Advice  The patient has been provided with information about: Chronic Pain and preventive services including:   · Fall Risk assessment done.    Visit Diagnoses:  No diagnosis found.    No orders of the defined types were placed in this encounter.      Outpatient Encounter Prescriptions as of 2/23/2018   Medication Sig Dispense Refill   • acetaminophen (TYLENOL) 650 MG 8 hr tablet Take 650 mg by mouth every 8 (eight) hours as needed for mild pain (1-3).     • Cholecalciferol (VITAMIN D) 2000 UNITS capsule Take 1,000 Units by mouth Daily.     • cycloSPORINE (RESTASIS) 0.05 % ophthalmic emulsion 1 drop 2 (two) times a day.     • diclofenac (VOLTAREN) 1 % gel gel Apply 4 g topically as needed.     • Flaxseed, Linseed, (FLAX SEED OIL PO) Take 2 capsules by mouth daily.     • hydroxychloroquine (PLAQUENIL) 200 MG tablet Take 200 mg by mouth 2 (two) times a day.     • methocarbamol (ROBAXIN) 500 MG tablet Take 500 mg by mouth 4 (four) times a day as needed for muscle spasms (1-2 tablets 4 times daily).     • naproxen sodium (ALEVE) 220 MG tablet Take 220 mg by mouth 2 (Two) Times a Day As Needed for mild pain (1-3).     • Omega-3 Fatty Acids (FISH OIL PO) Take 1 tablet by mouth Daily.       No facility-administered encounter medications on file as of 2/23/2018.        Reviewed use of high risk medication in the elderly: yes  Reviewed for potential of harmful drug interactions in  the elderly: yes    Follow Up:  No Follow-up on file.     An After Visit Summary and PPPS with all of these plans were given to the patient.

## 2018-02-26 ENCOUNTER — TREATMENT (OUTPATIENT)
Dept: PHYSICAL THERAPY | Facility: CLINIC | Age: 68
End: 2018-02-26

## 2018-02-26 DIAGNOSIS — M25.511 PAIN OF RIGHT SHOULDER REGION: ICD-10-CM

## 2018-02-26 DIAGNOSIS — Z98.890 S/P RIGHT ROTATOR CUFF REPAIR: Primary | ICD-10-CM

## 2018-02-26 PROCEDURE — 97110 THERAPEUTIC EXERCISES: CPT | Performed by: PHYSICAL THERAPIST

## 2018-02-26 PROCEDURE — 97140 MANUAL THERAPY 1/> REGIONS: CPT | Performed by: PHYSICAL THERAPIST

## 2018-02-26 NOTE — PROGRESS NOTES
Re-Assessment / Progress Note    Patient: Melany Lloyd   : 1950  Diagnosis/ICD-10 Code:  S/P right rotator cuff repair [Z98.890]  Referring practitioner: Romeo Walker MD  Date of Initial Visit: Episode Type: THERAPY  Noted: 2018    Today's Date: 2018  Patient seen for 7 sessions.    Surgery: 2018    Subjective:   Melany Lloyd reports: My (R) shoulder is better but it is still achy.  I can touch my head now.      Subjective Questionnaire: DASH: 78% perceived disability (88% upon initial Eval)  Clinical Progress: improved  Home Program Compliance: Yes  Treatment has included: therapeutic exercise and manual therapy    Subjective Evaluation    Pain  At worst pain ratin  Location: (R) Shoulder  Aggravating factors: sleeping         Objective       Active Range of Motion     Right Shoulder   Flexion: 94 degrees     Passive Range of Motion     Right Shoulder   Flexion: 161 degrees   Abduction: 164 degrees   External rotation 45°: 65 degrees     Strength/Myotome Testing     Additional Strength Details  No strength testing of the (R) shoulder at this time.       Assessment & Plan     Assessment  Assessment details: Melany continues to improve with her (R) shoulder rotator cuff repair.  Today we started into her 7th week and started strengthening exercises with fatigue but good tolerance.  Her ROM continues to improve steadily.  She is ready to start strengthening.  The pt would continue to benefit from skilled care at this time to help restore the pt to their prior level of function.  Thank you for this referral.      Plan  Plan details: Progress ROM / strengthening / stabilization / functional activity as tolerated        Progress toward previous goals: Partially Met    Goals  SHORT TERM GOALS: 5 weeks  1. Patient to be compliant with HEP and no diff. with sleeping  2. Increased (R) UE strength to 4-/5 with no pain> 4/10 to allow for household and work activities.   3. Pt to exhibit  (R) shoulder active flexion / ABD to 135° in standing/sitting to assist with reaching overhead with less pain  4. Pt demonstrates improved posture in sitting and standing with minimal-no cues during treatment session    LONG TERM GOALS: 3-4 months  1. Pt score <35% perceived disability on DASH   2. Pt. to exhibit (R) shoulder AROM to WFL (> 160° flex/abd. to allow for reaching overhead and behind back without pain limiting function  3. Pt to exhibit 4+/5 UE strength to allow for pushing/pulling and lifting >5 #to occur with pain <2/10  4. Pt able to reach overhead and lift 10# (B) x 10 to allow for return to doing work around home.         Recommendations: Continue as planned  Timeframe: 2-4 Months  Prognosis to achieve goals: good    PT Signature: Fede Winter PT  [place] Lic. # [id number]        Based upon review of the patient's progress and continued therapy plan, it is my medical opinion that Melany Lloyd should continue physical therapy treatment at [facility] PH[facility]  [address]  [facility] [address] [phone].    Signature: __________________________________  Romeo Walker MD    Manual Therapy:    10     mins  32029;  Therapeutic Exercise:    45     mins  14178;     Neuromuscular Wm:        mins  75938;    Therapeutic Activity:          mins  79532;     Gait Training:           mins  23334;     Ultrasound:          mins  64132;    Electrical Stimulation:         mins  11724 ( );  Dry Needling          mins self-pay    Timed Treatment:   55   mins   Total Treatment:     55   mins

## 2018-03-02 ENCOUNTER — TREATMENT (OUTPATIENT)
Dept: PHYSICAL THERAPY | Facility: CLINIC | Age: 68
End: 2018-03-02

## 2018-03-02 DIAGNOSIS — Z98.890 S/P RIGHT ROTATOR CUFF REPAIR: Primary | ICD-10-CM

## 2018-03-02 DIAGNOSIS — M25.511 PAIN OF RIGHT SHOULDER REGION: ICD-10-CM

## 2018-03-02 PROCEDURE — 97140 MANUAL THERAPY 1/> REGIONS: CPT | Performed by: PHYSICAL THERAPIST

## 2018-03-02 PROCEDURE — 97110 THERAPEUTIC EXERCISES: CPT | Performed by: PHYSICAL THERAPIST

## 2018-03-02 NOTE — PROGRESS NOTES
Physical Therapy Daily Progress Note  Visit: 8    Melany Lloyd reports: The  Is happy with the progress.  My (R) shdr is doing well but it is sore.    Subjective     Objective   See Exercise, Manual, and Modality Logs for complete treatment.       Assessment & Plan     Assessment  Assessment details: Pt yasmeen Rx today well with increasing exercise yasmeen expected with a RTC repair.     Plan  Plan details: Progress ROM / strengthening / stabilization / functional activity as tolerated                   Manual Therapy:    10     mins  26926;  Therapeutic Exercise:    30/45     mins  91260;     Neuromuscular Wm:        mins  24717;    Therapeutic Activity:          mins  11993;     Gait Training:           mins  91222;     Ultrasound:          mins  16124;    Electrical Stimulation:         mins  20036 ( );  Dry Needling          mins self-pay    Timed Treatment:   40   mins   Total Treatment:     55   mins    Fede Winter PT  [place] Lic. # [id number]  Physical Therapist

## 2018-03-06 ENCOUNTER — TREATMENT (OUTPATIENT)
Dept: PHYSICAL THERAPY | Facility: CLINIC | Age: 68
End: 2018-03-06

## 2018-03-06 DIAGNOSIS — M25.511 PAIN OF RIGHT SHOULDER REGION: ICD-10-CM

## 2018-03-06 DIAGNOSIS — Z98.890 S/P RIGHT ROTATOR CUFF REPAIR: Primary | ICD-10-CM

## 2018-03-06 PROCEDURE — 97110 THERAPEUTIC EXERCISES: CPT | Performed by: PHYSICAL THERAPIST

## 2018-03-06 PROCEDURE — 97140 MANUAL THERAPY 1/> REGIONS: CPT | Performed by: PHYSICAL THERAPIST

## 2018-03-06 NOTE — PROGRESS NOTES
Physical Therapy Daily Progress Note  Visit: 9    Melany Lloyd reports: My (R) shdr is sore but doing well.  No issues with the HEP.  I did walk more this weekend and that irritated my (R) shdr.      Subjective     Objective   See Exercise, Manual, and Modality Logs for complete treatment.       Assessment & Plan     Assessment  Assessment details: Pt yasmeen Rx well with progression of exercises in the clinic, with some soreness reported.  Pt is progressing well as expected.      Plan  Plan details: Progress ROM / strengthening /stabilization / functional activity as tolerated                   Manual Therapy:    10     mins  06592;  Therapeutic Exercise:    35/45     mins  58787;     Neuromuscular Wm:        mins  34714;    Therapeutic Activity:          mins  09704;     Gait Training:           mins  78808;     Ultrasound:          mins  98246;    Electrical Stimulation:         mins  02948 ( );  Dry Needling          mins self-pay    Timed Treatment:   45   mins   Total Treatment:     55   mins    Fede Winter PT  [place] Lic. # [id number]  Physical Therapist

## 2018-03-09 ENCOUNTER — TREATMENT (OUTPATIENT)
Dept: PHYSICAL THERAPY | Facility: CLINIC | Age: 68
End: 2018-03-09

## 2018-03-09 DIAGNOSIS — Z98.890 S/P RIGHT ROTATOR CUFF REPAIR: Primary | ICD-10-CM

## 2018-03-09 DIAGNOSIS — M25.511 PAIN OF RIGHT SHOULDER REGION: ICD-10-CM

## 2018-03-09 PROCEDURE — 97110 THERAPEUTIC EXERCISES: CPT | Performed by: PHYSICAL THERAPIST

## 2018-03-09 PROCEDURE — 97140 MANUAL THERAPY 1/> REGIONS: CPT | Performed by: PHYSICAL THERAPIST

## 2018-03-09 NOTE — PROGRESS NOTES
Physical Therapy Daily Progress Note  Visit: 10    Melany Lloyd reports: My (R) shdr is doing great.  I am doing more around the house and with dressing.      Subjective     Objective   See Exercise, Manual, and Modality Logs for complete treatment.       Assessment & Plan     Assessment  Assessment details: Pt is yasmeen Rx well with increasing exercise well.      Plan  Plan details: Progress ROM / strengthening / stabilization / functional activity as tolerated                   Manual Therapy:    10     mins  16639;  Therapeutic Exercise:    35/45     mins  86793;     Neuromuscular Wm:        mins  46704;    Therapeutic Activity:          mins  92696;     Gait Training:           mins  32812;     Ultrasound:          mins  50340;    Electrical Stimulation:         mins  03940 ( );  Dry Needling          mins self-pay    Timed Treatment:   45   mins   Total Treatment:     55   mins    Fede Winter PT  [place] Lic. # [id number]  Physical Therapist

## 2018-03-12 ENCOUNTER — TREATMENT (OUTPATIENT)
Dept: PHYSICAL THERAPY | Facility: CLINIC | Age: 68
End: 2018-03-12

## 2018-03-12 DIAGNOSIS — Z98.890 S/P RIGHT ROTATOR CUFF REPAIR: Primary | ICD-10-CM

## 2018-03-12 DIAGNOSIS — M25.511 PAIN OF RIGHT SHOULDER REGION: ICD-10-CM

## 2018-03-12 PROCEDURE — 97110 THERAPEUTIC EXERCISES: CPT | Performed by: PHYSICAL THERAPIST

## 2018-03-12 PROCEDURE — 97140 MANUAL THERAPY 1/> REGIONS: CPT | Performed by: PHYSICAL THERAPIST

## 2018-03-12 NOTE — PROGRESS NOTES
Physical Therapy Daily Progress Note  Visit: 11    Melany Lloyd reports: My (R) shdr is feeling good, but it is sore from doing more.      Subjective     Objective   See Exercise, Manual, and Modality Logs for complete treatment.       Assessment & Plan     Assessment  Assessment details: Pt continues to steadily improve with the (R) shoulder.  No concerns.    Plan  Plan details: Progress ROM / strengthening / stabilization / functional activity as tolerated                   Manual Therapy:    10     mins  06281;  Therapeutic Exercise:    35/45     mins  74272;     Neuromuscular Wm:        mins  26541;    Therapeutic Activity:          mins  48796;     Gait Training:           mins  57582;     Ultrasound:          mins  44368;    Electrical Stimulation:         mins  39784 ( );  Dry Needling          mins self-pay    Timed Treatment:   45   mins   Total Treatment:     55   mins    Fede Winter PT  [place] Lic. # [id number]  Physical Therapist

## 2018-03-20 ENCOUNTER — TREATMENT (OUTPATIENT)
Dept: PHYSICAL THERAPY | Facility: CLINIC | Age: 68
End: 2018-03-20

## 2018-03-20 DIAGNOSIS — M25.511 PAIN OF RIGHT SHOULDER REGION: ICD-10-CM

## 2018-03-20 DIAGNOSIS — Z98.890 S/P RIGHT ROTATOR CUFF REPAIR: Primary | ICD-10-CM

## 2018-03-20 PROCEDURE — 97110 THERAPEUTIC EXERCISES: CPT | Performed by: PHYSICAL THERAPIST

## 2018-03-20 PROCEDURE — 97140 MANUAL THERAPY 1/> REGIONS: CPT | Performed by: PHYSICAL THERAPIST

## 2018-03-20 NOTE — PROGRESS NOTES
Physical Therapy Daily Progress Note  Visit: 12    Melany Lloyd reports: My R shdr is doing well.  I am feeling stronger.  I was worried because I was sick last wk and     Subjective     Objective   See Exercise, Manual, and Modality Logs for complete treatment.       Assessment & Plan     Assessment  Assessment details: Pt continues to improve with increasing yasmeen for exercise w/ the (R) shdr.  Pt is expected to be sore after today's Rx.      Plan  Plan details: Progress ROM / strengthening /stabilization / functional activity as tolerated                   Manual Therapy:    10     mins  34511;  Therapeutic Exercise:    35/45     mins  84844;     Neuromuscular Wm:        mins  13653;    Therapeutic Activity:          mins  82420;     Gait Training:           mins  32789;     Ultrasound:          mins  42641;    Electrical Stimulation:         mins  91056 ( );  Dry Needling          mins self-pay    Timed Treatment:   45   mins   Total Treatment:     55   mins    Fede Winter PT  [place] Lic. # [id number]  Physical Therapist

## 2018-03-26 ENCOUNTER — TREATMENT (OUTPATIENT)
Dept: PHYSICAL THERAPY | Facility: CLINIC | Age: 68
End: 2018-03-26

## 2018-03-26 DIAGNOSIS — Z98.890 S/P RIGHT ROTATOR CUFF REPAIR: Primary | ICD-10-CM

## 2018-03-26 DIAGNOSIS — M25.511 PAIN OF RIGHT SHOULDER REGION: ICD-10-CM

## 2018-03-26 PROCEDURE — 97140 MANUAL THERAPY 1/> REGIONS: CPT | Performed by: PHYSICAL THERAPIST

## 2018-03-26 PROCEDURE — 97110 THERAPEUTIC EXERCISES: CPT | Performed by: PHYSICAL THERAPIST

## 2018-03-30 ENCOUNTER — TREATMENT (OUTPATIENT)
Dept: PHYSICAL THERAPY | Facility: CLINIC | Age: 68
End: 2018-03-30

## 2018-03-30 DIAGNOSIS — Z98.890 S/P RIGHT ROTATOR CUFF REPAIR: Primary | ICD-10-CM

## 2018-03-30 DIAGNOSIS — M25.511 PAIN OF RIGHT SHOULDER REGION: ICD-10-CM

## 2018-03-30 PROCEDURE — 97110 THERAPEUTIC EXERCISES: CPT | Performed by: PHYSICAL THERAPIST

## 2018-03-30 NOTE — PROGRESS NOTES
Physical Therapy Daily Progress Note  Visit: 14    Melany Lloyd reports: My (R) shdr is doing better.  The MD is happy with my progress but wants to work the the scapular dyskinesia.      Subjective     Objective   See Exercise, Manual, and Modality Logs for complete treatment.       Assessment & Plan     Assessment  Assessment details: Pt had a remarkable improvement this visit with (R) scapular mobility and increased normal fluid motion.  The scapula did not track as smoothly on the return from shdr ABD, showing the (R) scapular stabilizers weakness.  Will continue to progress to restore normal functional ability.      Plan  Plan details: Progress ROM / strengthening /stabilization / functional activity as tolerated                   Manual Therapy:         mins  11173;  Therapeutic Exercise:    60     mins  70902;     Neuromuscular Wm:        mins  81432;    Therapeutic Activity:          mins  91372;     Gait Training:           mins  97443;     Ultrasound:          mins  95612;    Electrical Stimulation:         mins  12596 ( );  Dry Needling          mins self-pay    Timed Treatment:   60   mins   Total Treatment:     60   mins    Fede Winter PT  [place] Lic. # [id number]  Physical Therapist

## 2018-04-02 ENCOUNTER — TREATMENT (OUTPATIENT)
Dept: PHYSICAL THERAPY | Facility: CLINIC | Age: 68
End: 2018-04-02

## 2018-04-02 DIAGNOSIS — Z98.890 S/P RIGHT ROTATOR CUFF REPAIR: Primary | ICD-10-CM

## 2018-04-02 DIAGNOSIS — M25.511 PAIN OF RIGHT SHOULDER REGION: ICD-10-CM

## 2018-04-02 PROCEDURE — 97140 MANUAL THERAPY 1/> REGIONS: CPT | Performed by: PHYSICAL THERAPIST

## 2018-04-02 PROCEDURE — 97110 THERAPEUTIC EXERCISES: CPT | Performed by: PHYSICAL THERAPIST

## 2018-04-04 NOTE — PROGRESS NOTES
Physical Therapy Daily Progress Note  Visit: 15    Melany Lloyd reports: My (R) shdr is feeling better. I am feeling stronger with exercise and activities, but still feel weak in certain positions.     Subjective     Objective   See Exercise, Manual, and Modality Logs for complete treatment.       Assessment & Plan     Assessment  Assessment details: Pt is yasmeen Rx well with increasing function of the (R) shdr and UE.  Still tight into IR.  The scapular dyskinesia of the (R) shdr is improving and now the deficit is the most obvious with the eccentric motion after flex & ABD.      Plan  Plan details: Progress ROM / strengthening /stabilization / functional activity as tolerated                   Manual Therapy:    10     mins  03706;  Therapeutic Exercise:    30/60     mins  25843;     Neuromuscular mW:        mins  08896;    Therapeutic Activity:          mins  45649;     Gait Training:           mins  51797;     Ultrasound:          mins  34183;    Electrical Stimulation:         mins  37674 ( );  Dry Needling          mins self-pay    Timed Treatment:   40   mins   Total Treatment:     70   mins    Fede Winter PT  [place] Lic. # [id number]  Physical Therapist

## 2018-04-06 ENCOUNTER — TREATMENT (OUTPATIENT)
Dept: PHYSICAL THERAPY | Facility: CLINIC | Age: 68
End: 2018-04-06

## 2018-04-06 ENCOUNTER — OFFICE VISIT (OUTPATIENT)
Dept: RETAIL CLINIC | Facility: CLINIC | Age: 68
End: 2018-04-06

## 2018-04-06 VITALS
SYSTOLIC BLOOD PRESSURE: 128 MMHG | TEMPERATURE: 98.1 F | OXYGEN SATURATION: 97 % | DIASTOLIC BLOOD PRESSURE: 80 MMHG | RESPIRATION RATE: 18 BRPM | HEART RATE: 76 BPM

## 2018-04-06 DIAGNOSIS — Z98.890 S/P RIGHT ROTATOR CUFF REPAIR: Primary | ICD-10-CM

## 2018-04-06 DIAGNOSIS — M25.511 PAIN OF RIGHT SHOULDER REGION: ICD-10-CM

## 2018-04-06 DIAGNOSIS — J06.9 ACUTE URI: Primary | ICD-10-CM

## 2018-04-06 DIAGNOSIS — J01.00 ACUTE MAXILLARY SINUSITIS, RECURRENCE NOT SPECIFIED: ICD-10-CM

## 2018-04-06 LAB
EXPIRATION DATE: NORMAL
INTERNAL CONTROL: NORMAL
Lab: NORMAL
S PYO AG THROAT QL: NEGATIVE

## 2018-04-06 PROCEDURE — 97140 MANUAL THERAPY 1/> REGIONS: CPT | Performed by: PHYSICAL THERAPIST

## 2018-04-06 PROCEDURE — 99213 OFFICE O/P EST LOW 20 MIN: CPT | Performed by: NURSE PRACTITIONER

## 2018-04-06 PROCEDURE — 87880 STREP A ASSAY W/OPTIC: CPT | Performed by: NURSE PRACTITIONER

## 2018-04-06 PROCEDURE — 97110 THERAPEUTIC EXERCISES: CPT | Performed by: PHYSICAL THERAPIST

## 2018-04-06 RX ORDER — BENZONATATE 200 MG/1
200 CAPSULE ORAL 3 TIMES DAILY PRN
Qty: 15 CAPSULE | Refills: 0 | Status: SHIPPED | OUTPATIENT
Start: 2018-04-06 | End: 2018-04-11

## 2018-04-06 RX ORDER — DOXYCYCLINE 100 MG/1
100 CAPSULE ORAL 2 TIMES DAILY
Qty: 14 CAPSULE | Refills: 0 | Status: SHIPPED | OUTPATIENT
Start: 2018-04-06 | End: 2018-04-13

## 2018-04-06 NOTE — PATIENT INSTRUCTIONS
"Upper Respiratory Infection, Adult  Most upper respiratory infections (URIs) are a viral infection of the air passages leading to the lungs. A URI affects the nose, throat, and upper air passages. The most common type of URI is nasopharyngitis and is typically referred to as \"the common cold.\"  URIs run their course and usually go away on their own. Most of the time, a URI does not require medical attention, but sometimes a bacterial infection in the upper airways can follow a viral infection. This is called a secondary infection. Sinus and middle ear infections are common types of secondary upper respiratory infections.  Bacterial pneumonia can also complicate a URI. A URI can worsen asthma and chronic obstructive pulmonary disease (COPD). Sometimes, these complications can require emergency medical care and may be life threatening.  What are the causes?  Almost all URIs are caused by viruses. A virus is a type of germ and can spread from one person to another.  What increases the risk?  You may be at risk for a URI if:  · You smoke.  · You have chronic heart or lung disease.  · You have a weakened defense (immune) system.  · You are very young or very old.  · You have nasal allergies or asthma.  · You work in crowded or poorly ventilated areas.  · You work in health care facilities or schools.  What are the signs or symptoms?  Symptoms typically develop 2-3 days after you come in contact with a cold virus. Most viral URIs last 7-10 days. However, viral URIs from the influenza virus (flu virus) can last 14-18 days and are typically more severe. Symptoms may include:  · Runny or stuffy (congested) nose.  · Sneezing.  · Cough.  · Sore throat.  · Headache.  · Fatigue.  · Fever.  · Loss of appetite.  · Pain in your forehead, behind your eyes, and over your cheekbones (sinus pain).  · Muscle aches.  How is this diagnosed?  Your health care provider may diagnose a URI by:  · Physical exam.  · Tests to check that your " symptoms are not due to another condition such as:  ¨ Strep throat.  ¨ Sinusitis.  ¨ Pneumonia.  ¨ Asthma.  How is this treated?  A URI goes away on its own with time. It cannot be cured with medicines, but medicines may be prescribed or recommended to relieve symptoms. Medicines may help:  · Reduce your fever.  · Reduce your cough.  · Relieve nasal congestion.  Follow these instructions at home:  · Take medicines only as directed by your health care provider.  · Gargle warm saltwater or take cough drops to comfort your throat as directed by your health care provider.  · Use a warm mist humidifier or inhale steam from a shower to increase air moisture. This may make it easier to breathe.  · Drink enough fluid to keep your urine clear or pale yellow.  · Eat soups and other clear broths and maintain good nutrition.  · Rest as needed.  · Return to work when your temperature has returned to normal or as your health care provider advises. You may need to stay home longer to avoid infecting others. You can also use a face mask and careful hand washing to prevent spread of the virus.  · Increase the usage of your inhaler if you have asthma.  · Do not use any tobacco products, including cigarettes, chewing tobacco, or electronic cigarettes. If you need help quitting, ask your health care provider.  How is this prevented?  The best way to protect yourself from getting a cold is to practice good hygiene.  · Avoid oral or hand contact with people with cold symptoms.  · Wash your hands often if contact occurs.  There is no clear evidence that vitamin C, vitamin E, echinacea, or exercise reduces the chance of developing a cold. However, it is always recommended to get plenty of rest, exercise, and practice good nutrition.  Contact a health care provider if:  · You are getting worse rather than better.  · Your symptoms are not controlled by medicine.  · You have chills.  · You have worsening shortness of breath.  · You have brown  or red mucus.  · You have yellow or brown nasal discharge.  · You have pain in your face, especially when you bend forward.  · You have a fever.  · You have swollen neck glands.  · You have pain while swallowing.  · You have white areas in the back of your throat.  Get help right away if:  · You have severe or persistent:  ¨ Headache.  ¨ Ear pain.  ¨ Sinus pain.  ¨ Chest pain.  · You have chronic lung disease and any of the following:  ¨ Wheezing.  ¨ Prolonged cough.  ¨ Coughing up blood.  ¨ A change in your usual mucus.  · You have a stiff neck.  · You have changes in your:  ¨ Vision.  ¨ Hearing.  ¨ Thinking.  ¨ Mood.  This information is not intended to replace advice given to you by your health care provider. Make sure you discuss any questions you have with your health care provider.  Document Released: 06/13/2002 Document Revised: 08/20/2017 Document Reviewed: 03/25/2015  [employer] Interactive Patient Education © 2017 [employer]  Sinusitis, Adult  Sinusitis is soreness and inflammation of your sinuses. Sinuses are hollow spaces in the bones around your face. Your sinuses are located:  · Around your eyes.  · In the middle of your forehead.  · Behind your nose.  · In your cheekbones.  Your sinuses and nasal passages are lined with a stringy fluid (mucus). Mucus normally drains out of your sinuses. When your nasal tissues become inflamed or swollen, the mucus can become trapped or blocked so air cannot flow through your sinuses. This allows bacteria, viruses, and funguses to grow, which leads to infection.  Sinusitis can develop quickly and last for 7?10 days (acute) or for more than 12 weeks (chronic). Sinusitis often develops after a cold.  What are the causes?  This condition is caused by anything that creates swelling in the sinuses or stops mucus from draining, including:  · Allergies.  · Asthma.  · Bacterial or viral infection.  · Abnormally shaped bones between the nasal passages.  · Nasal growths that  contain mucus (nasal polyps).  · Narrow sinus openings.  · Pollutants, such as chemicals or irritants in the air.  · A foreign object stuck in the nose.  · A fungal infection. This is rare.  What increases the risk?  The following factors may make you more likely to develop this condition:  · Having allergies or asthma.  · Having had a recent cold or respiratory tract infection.  · Having structural deformities or blockages in your nose or sinuses.  · Having a weak immune system.  · Doing a lot of swimming or diving.  · Overusing nasal sprays.  · Smoking.  What are the signs or symptoms?  The main symptoms of this condition are pain and a feeling of pressure around the affected sinuses. Other symptoms include:  · Upper toothache.  · Earache.  · Headache.  · Bad breath.  · Decreased sense of smell and taste.  · A cough that may get worse at night.  · Fatigue.  · Fever.  · Thick drainage from your nose. The drainage is often green and it may contain pus (purulent).  · Stuffy nose or congestion.  · Postnasal drip. This is when extra mucus collects in the throat or back of the nose.  · Swelling and warmth over the affected sinuses.  · Sore throat.  · Sensitivity to light.  How is this diagnosed?  This condition is diagnosed based on symptoms, a medical history, and a physical exam. To find out if your condition is acute or chronic, your health care provider may:  · Look in your nose for signs of nasal polyps.  · Tap over the affected sinus to check for signs of infection.  · View the inside of your sinuses using an imaging device that has a light attached (endoscope).  If your health care provider suspects that you have chronic sinusitis, you may also:  · Be tested for allergies.  · Have a sample of mucus taken from your nose (nasal culture) and checked for bacteria.  · Have a mucus sample examined to see if your sinusitis is related to an allergy.  If your sinusitis does not respond to treatment and it lasts longer  than 8 weeks, you may have an MRI or CT scan to check your sinuses. These scans also help to determine how severe your infection is.  In rare cases, a bone biopsy may be done to rule out more serious types of fungal sinus disease.  How is this treated?  Treatment for sinusitis depends on the cause and whether your condition is chronic or acute. If a virus is causing your sinusitis, your symptoms will go away on their own within 10 days. You may be given medicines to relieve your symptoms, including:  · Topical nasal decongestants. They shrink swollen nasal passages and let mucus drain from your sinuses.  · Antihistamines. These drugs block inflammation that is triggered by allergies. This can help to ease swelling in your nose and sinuses.  · Topical nasal corticosteroids. These are nasal sprays that ease inflammation and swelling in your nose and sinuses.  · Nasal saline washes. These rinses can help to get rid of thick mucus in your nose.  If your condition is caused by bacteria, you will be given an antibiotic medicine. If your condition is caused by a fungus, you will be given an antifungal medicine.  Surgery may be needed to correct underlying conditions, such as narrow nasal passages. Surgery may also be needed to remove polyps.  Follow these instructions at home:  Medicines   · Take, use, or apply over-the-counter and prescription medicines only as told by your health care provider. These may include nasal sprays.  · If you were prescribed an antibiotic medicine, take it as told by your health care provider. Do not stop taking the antibiotic even if you start to feel better.  Hydrate and Humidify   · Drink enough water to keep your urine clear or pale yellow. Staying hydrated will help to thin your mucus.  · Use a cool mist humidifier to keep the humidity level in your home above 50%.  · Inhale steam for 10-15 minutes, 3-4 times a day or as told by your health care provider. You can do this in the bathroom  while a hot shower is running.  · Limit your exposure to cool or dry air.  Rest   · Rest as much as possible.  · Sleep with your head raised (elevated).  · Make sure to get enough sleep each night.  General instructions   · Apply a warm, moist washcloth to your face 3-4 times a day or as told by your health care provider. This will help with discomfort.  · Wash your hands often with soap and water to reduce your exposure to viruses and other germs. If soap and water are not available, use hand .  · Do not smoke. Avoid being around people who are smoking (secondhand smoke).  · Keep all follow-up visits as told by your health care provider. This is important.  Contact a health care provider if:  · You have a fever.  · Your symptoms get worse.  · Your symptoms do not improve within 10 days.  Get help right away if:  · You have a severe headache.  · You have persistent vomiting.  · You have pain or swelling around your face or eyes.  · You have vision problems.  · You develop confusion.  · Your neck is stiff.  · You have trouble breathing.  This information is not intended to replace advice given to you by your health care provider. Make sure you discuss any questions you have with your health care provider.  Document Released: 12/18/2006 Document Revised: 08/13/2017 Document Reviewed: 10/12/2016  [employer] Interactive Patient Education © 2017 [employer]

## 2018-04-06 NOTE — PROGRESS NOTES
Physical Therapy Daily Progress Note  Visit: 16    Melany Lloyd reports: My (R) shdr is doing well.      Subjective     Objective   See Exercise, Manual, and Modality Logs for complete treatment.       Assessment & Plan     Assessment  Assessment details: Pt is improving with increasing exercise yasmeen with the (R) shdr.  There is still some shdr hiking with (R) shdr flex, but improvement is seen.      Plan  Plan details: Progress ROM / strengthening /stabilization / functional activity as tolerated                   Manual Therapy:    10     mins  19484;  Therapeutic Exercise:    50/60     mins  70212;     Neuromuscular Wm:        mins  96245;    Therapeutic Activity:          mins  58631;     Gait Training:           mins  39742;     Ultrasound:          mins  67382;    Electrical Stimulation:         mins  36453 ( );  Dry Needling          mins self-pay    Timed Treatment:   60   mins   Total Treatment:     70   mins    Fede Winter PT  [place] Lic. # [id number]  Physical Therapist

## 2018-04-06 NOTE — PROGRESS NOTES
Subjective   Patient ID: Melany Lloyd is a 67 y.o. female presents with   Chief Complaint   Patient presents with   • Sore Throat       Sore Throat    This is a new problem. The current episode started 1 to 4 weeks ago (worsened yesterday). The problem has been gradually worsening. The pain is worse on the left side. There has been no fever. The pain is at a severity of 4/10. Associated symptoms include coughing (clear), diarrhea (mild yesterday), ear pain (left) and headaches. Pertinent negatives include no ear discharge, shortness of breath, trouble swallowing or vomiting. She has had exposure to strep. She has had no exposure to mono. She has tried acetaminophen for the symptoms. The treatment provided mild relief.       Allergies   Allergen Reactions   • Penicillins Itching and Rash   • Red Dye Itching and Rash       The following portions of the patient's history were reviewed and updated as appropriate: allergies, current medications, past family history, past medical history, past social history, past surgical history and problem list.      Review of Systems   Constitutional: Positive for fatigue. Negative for chills, diaphoresis and fever.   HENT: Positive for ear pain (left), postnasal drip, rhinorrhea, sinus pain (left side), sinus pressure, sneezing and sore throat. Negative for ear discharge and trouble swallowing.    Respiratory: Positive for cough (clear). Negative for chest tightness, shortness of breath and wheezing.    Cardiovascular: Negative.    Gastrointestinal: Positive for diarrhea (mild yesterday) and nausea. Negative for vomiting.   Musculoskeletal:        Bodyaches   Neurological: Positive for headaches.       Objective     Vitals:    04/06/18 1221   BP: 128/80   Pulse: 76   Resp: 18   Temp: 98.1 °F (36.7 °C)   SpO2: 97%         Physical Exam   Constitutional: She is oriented to person, place, and time. She appears well-developed and well-nourished. She does not appear ill. No distress.    HENT:   Head: Normocephalic.   Right Ear: Hearing, tympanic membrane, external ear and ear canal normal.   Left Ear: Hearing, tympanic membrane, external ear and ear canal normal.   Nose: Mucosal edema and sinus tenderness present. No rhinorrhea. Right sinus exhibits maxillary sinus tenderness. Left sinus exhibits maxillary sinus tenderness.   Mouth/Throat: Mucous membranes are normal. Posterior oropharyngeal erythema present. No tonsillar exudate.   Eyes: Conjunctivae are normal.   Sclera white.   Neck: No tracheal deviation present.   Cardiovascular: Normal rate, regular rhythm, S1 normal, S2 normal and normal heart sounds.    Pulmonary/Chest: Effort normal and breath sounds normal. No accessory muscle usage. No respiratory distress.   Abdominal: Soft. Bowel sounds are increased. There is no tenderness.   Lymphadenopathy:     She has cervical adenopathy.        Right cervical: Superficial cervical adenopathy present.        Left cervical: Superficial cervical adenopathy present.   Neurological: She is alert and oriented to person, place, and time.   Skin: Skin is warm and dry.   Vitals reviewed.    Lab Results   Component Value Date    RAPSCRN Negative 04/06/2018         Melany was seen today for sore throat.    Diagnoses and all orders for this visit:    Acute URI  -     benzonatate (TESSALON) 200 MG capsule; Take 1 capsule by mouth 3 (Three) Times a Day As Needed for Cough for up to 5 days.  -     POC Rapid Strep A    Acute maxillary sinusitis, recurrence not specified  -     doxycycline (MONODOX) 100 MG capsule; Take 1 capsule by mouth 2 (Two) Times a Day for 7 days.        Follow-up with Primary Care Physician in 48-72 hours if these symptoms worsen or fail to improve as anticipated. Patient verbalizes understanding.    Upper Respiratory Infection, Adult  Most upper respiratory infections (URIs) are a viral infection of the air passages leading to the lungs. A URI affects the nose, throat, and upper air  "passages. The most common type of URI is nasopharyngitis and is typically referred to as \"the common cold.\"  URIs run their course and usually go away on their own. Most of the time, a URI does not require medical attention, but sometimes a bacterial infection in the upper airways can follow a viral infection. This is called a secondary infection. Sinus and middle ear infections are common types of secondary upper respiratory infections.  Bacterial pneumonia can also complicate a URI. A URI can worsen asthma and chronic obstructive pulmonary disease (COPD). Sometimes, these complications can require emergency medical care and may be life threatening.  What are the causes?  Almost all URIs are caused by viruses. A virus is a type of germ and can spread from one person to another.  What increases the risk?  You may be at risk for a URI if:  · You smoke.  · You have chronic heart or lung disease.  · You have a weakened defense (immune) system.  · You are very young or very old.  · You have nasal allergies or asthma.  · You work in crowded or poorly ventilated areas.  · You work in health care facilities or schools.  What are the signs or symptoms?  Symptoms typically develop 2-3 days after you come in contact with a cold virus. Most viral URIs last 7-10 days. However, viral URIs from the influenza virus (flu virus) can last 14-18 days and are typically more severe. Symptoms may include:  · Runny or stuffy (congested) nose.  · Sneezing.  · Cough.  · Sore throat.  · Headache.  · Fatigue.  · Fever.  · Loss of appetite.  · Pain in your forehead, behind your eyes, and over your cheekbones (sinus pain).  · Muscle aches.  How is this diagnosed?  Your health care provider may diagnose a URI by:  · Physical exam.  · Tests to check that your symptoms are not due to another condition such as:  ¨ Strep throat.  ¨ Sinusitis.  ¨ Pneumonia.  ¨ Asthma.  How is this treated?  A URI goes away on its own with time. It cannot be cured " with medicines, but medicines may be prescribed or recommended to relieve symptoms. Medicines may help:  · Reduce your fever.  · Reduce your cough.  · Relieve nasal congestion.  Follow these instructions at home:  · Take medicines only as directed by your health care provider.  · Gargle warm saltwater or take cough drops to comfort your throat as directed by your health care provider.  · Use a warm mist humidifier or inhale steam from a shower to increase air moisture. This may make it easier to breathe.  · Drink enough fluid to keep your urine clear or pale yellow.  · Eat soups and other clear broths and maintain good nutrition.  · Rest as needed.  · Return to work when your temperature has returned to normal or as your health care provider advises. You may need to stay home longer to avoid infecting others. You can also use a face mask and careful hand washing to prevent spread of the virus.  · Increase the usage of your inhaler if you have asthma.  · Do not use any tobacco products, including cigarettes, chewing tobacco, or electronic cigarettes. If you need help quitting, ask your health care provider.  How is this prevented?  The best way to protect yourself from getting a cold is to practice good hygiene.  · Avoid oral or hand contact with people with cold symptoms.  · Wash your hands often if contact occurs.  There is no clear evidence that vitamin C, vitamin E, echinacea, or exercise reduces the chance of developing a cold. However, it is always recommended to get plenty of rest, exercise, and practice good nutrition.  Contact a health care provider if:  · You are getting worse rather than better.  · Your symptoms are not controlled by medicine.  · You have chills.  · You have worsening shortness of breath.  · You have brown or red mucus.  · You have yellow or brown nasal discharge.  · You have pain in your face, especially when you bend forward.  · You have a fever.  · You have swollen neck glands.  · You  have pain while swallowing.  · You have white areas in the back of your throat.  Get help right away if:  · You have severe or persistent:  ¨ Headache.  ¨ Ear pain.  ¨ Sinus pain.  ¨ Chest pain.  · You have chronic lung disease and any of the following:  ¨ Wheezing.  ¨ Prolonged cough.  ¨ Coughing up blood.  ¨ A change in your usual mucus.  · You have a stiff neck.  · You have changes in your:  ¨ Vision.  ¨ Hearing.  ¨ Thinking.  ¨ Mood.  This information is not intended to replace advice given to you by your health care provider. Make sure you discuss any questions you have with your health care provider.  Document Released: 06/13/2002 Document Revised: 08/20/2017 Document Reviewed: 03/25/2015  [employer] Interactive Patient Education © 2017 [employer]  Sinusitis, Adult  Sinusitis is soreness and inflammation of your sinuses. Sinuses are hollow spaces in the bones around your face. Your sinuses are located:  · Around your eyes.  · In the middle of your forehead.  · Behind your nose.  · In your cheekbones.  Your sinuses and nasal passages are lined with a stringy fluid (mucus). Mucus normally drains out of your sinuses. When your nasal tissues become inflamed or swollen, the mucus can become trapped or blocked so air cannot flow through your sinuses. This allows bacteria, viruses, and funguses to grow, which leads to infection.  Sinusitis can develop quickly and last for 7?10 days (acute) or for more than 12 weeks (chronic). Sinusitis often develops after a cold.  What are the causes?  This condition is caused by anything that creates swelling in the sinuses or stops mucus from draining, including:  · Allergies.  · Asthma.  · Bacterial or viral infection.  · Abnormally shaped bones between the nasal passages.  · Nasal growths that contain mucus (nasal polyps).  · Narrow sinus openings.  · Pollutants, such as chemicals or irritants in the air.  · A foreign object stuck in the nose.  · A fungal infection. This is  rare.  What increases the risk?  The following factors may make you more likely to develop this condition:  · Having allergies or asthma.  · Having had a recent cold or respiratory tract infection.  · Having structural deformities or blockages in your nose or sinuses.  · Having a weak immune system.  · Doing a lot of swimming or diving.  · Overusing nasal sprays.  · Smoking.  What are the signs or symptoms?  The main symptoms of this condition are pain and a feeling of pressure around the affected sinuses. Other symptoms include:  · Upper toothache.  · Earache.  · Headache.  · Bad breath.  · Decreased sense of smell and taste.  · A cough that may get worse at night.  · Fatigue.  · Fever.  · Thick drainage from your nose. The drainage is often green and it may contain pus (purulent).  · Stuffy nose or congestion.  · Postnasal drip. This is when extra mucus collects in the throat or back of the nose.  · Swelling and warmth over the affected sinuses.  · Sore throat.  · Sensitivity to light.  How is this diagnosed?  This condition is diagnosed based on symptoms, a medical history, and a physical exam. To find out if your condition is acute or chronic, your health care provider may:  · Look in your nose for signs of nasal polyps.  · Tap over the affected sinus to check for signs of infection.  · View the inside of your sinuses using an imaging device that has a light attached (endoscope).  If your health care provider suspects that you have chronic sinusitis, you may also:  · Be tested for allergies.  · Have a sample of mucus taken from your nose (nasal culture) and checked for bacteria.  · Have a mucus sample examined to see if your sinusitis is related to an allergy.  If your sinusitis does not respond to treatment and it lasts longer than 8 weeks, you may have an MRI or CT scan to check your sinuses. These scans also help to determine how severe your infection is.  In rare cases, a bone biopsy may be done to rule out  more serious types of fungal sinus disease.  How is this treated?  Treatment for sinusitis depends on the cause and whether your condition is chronic or acute. If a virus is causing your sinusitis, your symptoms will go away on their own within 10 days. You may be given medicines to relieve your symptoms, including:  · Topical nasal decongestants. They shrink swollen nasal passages and let mucus drain from your sinuses.  · Antihistamines. These drugs block inflammation that is triggered by allergies. This can help to ease swelling in your nose and sinuses.  · Topical nasal corticosteroids. These are nasal sprays that ease inflammation and swelling in your nose and sinuses.  · Nasal saline washes. These rinses can help to get rid of thick mucus in your nose.  If your condition is caused by bacteria, you will be given an antibiotic medicine. If your condition is caused by a fungus, you will be given an antifungal medicine.  Surgery may be needed to correct underlying conditions, such as narrow nasal passages. Surgery may also be needed to remove polyps.  Follow these instructions at home:  Medicines   · Take, use, or apply over-the-counter and prescription medicines only as told by your health care provider. These may include nasal sprays.  · If you were prescribed an antibiotic medicine, take it as told by your health care provider. Do not stop taking the antibiotic even if you start to feel better.  Hydrate and Humidify   · Drink enough water to keep your urine clear or pale yellow. Staying hydrated will help to thin your mucus.  · Use a cool mist humidifier to keep the humidity level in your home above 50%.  · Inhale steam for 10-15 minutes, 3-4 times a day or as told by your health care provider. You can do this in the bathroom while a hot shower is running.  · Limit your exposure to cool or dry air.  Rest   · Rest as much as possible.  · Sleep with your head raised (elevated).  · Make sure to get enough sleep  each night.  General instructions   · Apply a warm, moist washcloth to your face 3-4 times a day or as told by your health care provider. This will help with discomfort.  · Wash your hands often with soap and water to reduce your exposure to viruses and other germs. If soap and water are not available, use hand .  · Do not smoke. Avoid being around people who are smoking (secondhand smoke).  · Keep all follow-up visits as told by your health care provider. This is important.  Contact a health care provider if:  · You have a fever.  · Your symptoms get worse.  · Your symptoms do not improve within 10 days.  Get help right away if:  · You have a severe headache.  · You have persistent vomiting.  · You have pain or swelling around your face or eyes.  · You have vision problems.  · You develop confusion.  · Your neck is stiff.  · You have trouble breathing.  This information is not intended to replace advice given to you by your health care provider. Make sure you discuss any questions you have with your health care provider.  Document Released: 12/18/2006 Document Revised: 08/13/2017 Document Reviewed: 10/12/2016  [employer] Interactive Patient Education © 2017 [employer]

## 2018-04-09 ENCOUNTER — TREATMENT (OUTPATIENT)
Dept: PHYSICAL THERAPY | Facility: CLINIC | Age: 68
End: 2018-04-09

## 2018-04-09 DIAGNOSIS — Z98.890 S/P RIGHT ROTATOR CUFF REPAIR: Primary | ICD-10-CM

## 2018-04-09 DIAGNOSIS — M25.511 PAIN OF RIGHT SHOULDER REGION: ICD-10-CM

## 2018-04-09 PROCEDURE — 97110 THERAPEUTIC EXERCISES: CPT | Performed by: PHYSICAL THERAPIST

## 2018-04-09 PROCEDURE — 97140 MANUAL THERAPY 1/> REGIONS: CPT | Performed by: PHYSICAL THERAPIST

## 2018-04-09 NOTE — PROGRESS NOTES
Physical Therapy Daily Progress Note  Visit: 17    Melany Lloyd reports: My (R) shdr is feeling better but it still feels weak.    Subjective     Objective   See Exercise, Manual, and Modality Logs for complete treatment.       Assessment & Plan     Assessment  Assessment details: Pt continues to improve with increasing ROM, strength and function of the (R) shdr, but still has deficits.      Plan  Plan details: Progress ROM / strengthening /stabilization / functional activity as tolerated                   Manual Therapy:    10     mins  54688;  Therapeutic Exercise:    50/60     mins  70060;     Neuromuscular Wm:        mins  02575;    Therapeutic Activity:          mins  35031;     Gait Training:           mins  66108;     Ultrasound:          mins  31839;    Electrical Stimulation:         mins  66181 ( );  Dry Needling          mins self-pay    Timed Treatment:   60   mins   Total Treatment:     70   mins    Fede Winter PT  [place] Lic. # [id number]  Physical Therapist

## 2018-04-13 ENCOUNTER — TREATMENT (OUTPATIENT)
Dept: PHYSICAL THERAPY | Facility: CLINIC | Age: 68
End: 2018-04-13

## 2018-04-13 DIAGNOSIS — Z98.890 S/P RIGHT ROTATOR CUFF REPAIR: Primary | ICD-10-CM

## 2018-04-13 DIAGNOSIS — M25.511 PAIN OF RIGHT SHOULDER REGION: ICD-10-CM

## 2018-04-13 PROCEDURE — 97140 MANUAL THERAPY 1/> REGIONS: CPT | Performed by: PHYSICAL THERAPIST

## 2018-04-13 PROCEDURE — 97110 THERAPEUTIC EXERCISES: CPT | Performed by: PHYSICAL THERAPIST

## 2018-04-13 NOTE — PROGRESS NOTES
Physical Therapy Daily Progress Note  Visit: 18    Melany Lloyd reports: My (R) shdr is sore today because of caring my grandson yesterday.  It's hard to care for him because I have to lift him up.      Subjective     Objective   See Exercise, Manual, and Modality Logs for complete treatment.       Assessment & Plan     Assessment  Assessment details: Pt continues to improve with her (R) shdr strength and stability.  (R) shdr hiking is still present with scaption, but improved with time in front of a mirror.      Plan  Plan details: Progress ROM / strengthening /stabilization / functional activity as tolerated                   Manual Therapy:    12     mins  46272;  Therapeutic Exercise:    30/60     mins  01476;     Neuromuscular Wm:        mins  66489;    Therapeutic Activity:          mins  63698;     Gait Training:           mins  50342;     Ultrasound:          mins  79226;    Electrical Stimulation:         mins  29826 ( );  Dry Needling          mins self-pay    Timed Treatment:   42   mins   Total Treatment:     72   mins    Fede Winter PT  [place] Lic. # [id number]  Physical Therapist

## 2018-04-16 ENCOUNTER — TREATMENT (OUTPATIENT)
Dept: PHYSICAL THERAPY | Facility: CLINIC | Age: 68
End: 2018-04-16

## 2018-04-16 DIAGNOSIS — M25.511 PAIN OF RIGHT SHOULDER REGION: ICD-10-CM

## 2018-04-16 DIAGNOSIS — Z98.890 S/P RIGHT ROTATOR CUFF REPAIR: Primary | ICD-10-CM

## 2018-04-16 PROCEDURE — 97110 THERAPEUTIC EXERCISES: CPT | Performed by: PHYSICAL THERAPIST

## 2018-04-16 PROCEDURE — 97140 MANUAL THERAPY 1/> REGIONS: CPT | Performed by: PHYSICAL THERAPIST

## 2018-04-16 NOTE — PROGRESS NOTES
Physical Therapy Daily Progress Note  Visit: 19    Melany Lloyd reports: My (R) shdr is doing better, but it still feels tight.  I can tell that I can do more around the house.      Subjective     Objective   See Exercise, Manual, and Modality Logs for complete treatment.       Assessment & Plan     Assessment  Assessment details: Pt doesn't need further manual stretching at this time, because of the restore ROM of the (R) shdr.      Plan  Plan details: Progress ROM / strengthening / stabilization / functional activity as tolerated                   Manual Therapy:    10     mins  40633;  Therapeutic Exercise:    30/60     mins  36506;     Neuromuscular Wm:        mins  45533;    Therapeutic Activity:          mins  58810;     Gait Training:           mins  15776;     Ultrasound:          mins  40387;    Electrical Stimulation:         mins  57954 ( );  Dry Needling          mins self-pay    Timed Treatment:   40   mins   Total Treatment:     70   mins    Fede Winter PT  [place] Lic. # [id number]  Physical Therapist

## 2018-04-20 ENCOUNTER — TREATMENT (OUTPATIENT)
Dept: PHYSICAL THERAPY | Facility: CLINIC | Age: 68
End: 2018-04-20

## 2018-04-20 DIAGNOSIS — Z98.890 S/P RIGHT ROTATOR CUFF REPAIR: Primary | ICD-10-CM

## 2018-04-20 DIAGNOSIS — M25.511 PAIN OF RIGHT SHOULDER REGION: ICD-10-CM

## 2018-04-20 PROCEDURE — 97110 THERAPEUTIC EXERCISES: CPT | Performed by: PHYSICAL THERAPIST

## 2018-04-20 NOTE — PROGRESS NOTES
Physical Therapy Daily Progress Note  Visit: 20    Melany Lloyd reports: My (R) shdr is doing well, but I still notice that I am hiking it.    Subjective     Objective   See Exercise, Manual, and Modality Logs for complete treatment.       Assessment & Plan     Assessment  Assessment details: Pt yasmeen Rx well.  She continues to have weakness especially into flexion and scaption.      Plan  Plan details: Re-Eval for MD visit.                   Manual Therapy:         mins  22876;  Therapeutic Exercise:    40/60     mins  30737;     Neuromuscular Wm:        mins  30570;    Therapeutic Activity:          mins  37767;     Gait Training:           mins  14175;     Ultrasound:          mins  47302;    Electrical Stimulation:         mins  15315 ( );  Dry Needling          mins self-pay    Timed Treatment:   40   mins   Total Treatment:     60   mins    Fede Winter PT  [place] Lic. # [id number]  Physical Therapist

## 2018-04-23 ENCOUNTER — TREATMENT (OUTPATIENT)
Dept: PHYSICAL THERAPY | Facility: CLINIC | Age: 68
End: 2018-04-23

## 2018-04-23 DIAGNOSIS — Z98.890 S/P RIGHT ROTATOR CUFF REPAIR: Primary | ICD-10-CM

## 2018-04-23 DIAGNOSIS — M25.511 PAIN OF RIGHT SHOULDER REGION: ICD-10-CM

## 2018-04-23 PROCEDURE — 97110 THERAPEUTIC EXERCISES: CPT | Performed by: PHYSICAL THERAPIST

## 2018-04-23 NOTE — PROGRESS NOTES
Re-Assessment / Progress Note    Patient: Melany Lloyd   : 1950  Diagnosis/ICD-10 Code:  S/P right rotator cuff repair [Z98.890]  Referring practitioner: Romeo Walker MD  Date of Initial Visit: Episode Type: THERAPY  Noted: 2018    Today's Date: 2018  Patient seen for 21 sessions.      Subjective:   Melany Lloyd reports: My (R) shdr is feeling better with ROM, but I still feel like I need to work on my strength.      Subjective Questionnaire: QuickDASH: 29.55% Perceived   Clinical Progress: improved  Home Program Compliance: Yes  Treatment has included: therapeutic exercise, neuromuscular re-education, manual therapy, electrical stimulation and cryotherapy    Subjective Evaluation    Pain  At worst pain ratin  Location: (R) Shoulder  Exacerbated by: Laying Down.         Objective       Active Range of Motion     Right Shoulder   Flexion: 170 degrees   Abduction: 168 degrees   External rotation 90°: 105 degrees  Internal rotation 90°: 47 degrees     Strength/Myotome Testing     Right Shoulder     Planes of Motion   Flexion: 4   Abduction: 4+   External rotation at 0°: 4   Internal rotation at 0°: 5     Isolated Muscles   Biceps: 5   Triceps: 4+      Assessment & Plan     Assessment  Assessment details: Melany has continued to improve with her (R) rotator cuff.  She has increased ROM, strength and function.  Though she has increased strength, that is still her biggest deficit.  We are working to restore strength and normal function of the (R) shdr with less hiking.  The pt would continue to benefit from skilled care at this time to help restore the pt to their prior level of function.  Thank you for this referral.      Plan  Plan details: Progress ROM / strengthening /stabilization / functional activity as tolerated        Progress toward previous goals: Partially Met    Goals    SHORT TERM GOALS:   1. Patient to be compliant with HEP and no diff. with sleeping. - MET  2. Increased  (R) UE strength to 4-/5 with no pain> 4/10 to allow for household and work activities. - MET  3. Pt to exhibit (R) shoulder active flexion / ABD to 135° in standing/sitting to assist with reaching overhead with less pain. - MET  4. Pt demonstrates improved posture in sitting and standing with minimal-no cues during treatment session. - MET    LONG TERM GOALS: 3 weeks  1. Pt score <35% perceived disability on DASH - MET  2. Pt. to exhibit (R) shoulder AROM to WFL (> 160° flex/abd. to allow for reaching overhead and behind back without pain limiting function. - MET  3. Pt to exhibit 4+/5 UE strength to allow for pushing/pulling and lifting >5 #to occur with pain <2/10. - UNMET  4. Pt able to reach overhead and lift 10# (B) x 10 to allow for return to doing work around home. - UNMET        Recommendations: Continue as planned  Timeframe: 3 weeks  Prognosis to achieve goals: good    PT Signature: Fede Winter, PT  [place] Lic. # [id number]        Based upon review of the patient's progress and continued therapy plan, it is my medical opinion that Melany Lloyd should continue physical therapy treatment at [facility]  [address]  [facility] [address] [phone].    Signature: __________________________________  Romeo Walker MD    Manual Therapy:         mins  25178;  Therapeutic Exercise:    60     mins  34512;     Neuromuscular Wm:        mins  88294;    Therapeutic Activity:          mins  86769;     Gait Training:           mins  30422;     Ultrasound:          mins  70736;    Electrical Stimulation:         mins  19199 ( );  Dry Needling          mins self-pay    Timed Treatment:   60   mins   Total Treatment:     60   mins

## 2018-04-27 ENCOUNTER — TREATMENT (OUTPATIENT)
Dept: PHYSICAL THERAPY | Facility: CLINIC | Age: 68
End: 2018-04-27

## 2018-04-27 DIAGNOSIS — M25.511 PAIN OF RIGHT SHOULDER REGION: ICD-10-CM

## 2018-04-27 DIAGNOSIS — Z98.890 S/P RIGHT ROTATOR CUFF REPAIR: Primary | ICD-10-CM

## 2018-04-27 PROCEDURE — 97110 THERAPEUTIC EXERCISES: CPT | Performed by: PHYSICAL THERAPIST

## 2018-04-28 NOTE — PROGRESS NOTES
Physical Therapy Daily Progress Note  Visit: 22    Melany Lloyd reports: The Dr is happy with my progression but he wants to work on scapular dyskinesia.  The day after I saw the Dr so happened to my shdr, not sure what, but I can move it better.      Subjective     Objective   See Exercise, Manual, and Modality Logs for complete treatment.       Assessment & Plan     Assessment  Assessment details: Improved (R) shdr ability with not hike with shdr flex and abd.  Much less scapular dyskinesia noticed with scaption.       Plan  Plan details: Progress ROM / strengthening /stabilization / functional activity as tolerated                   Manual Therapy:         mins  20816;  Therapeutic Exercise:    40/60     mins  77575;     Neuromuscular Wm:        mins  69069;    Therapeutic Activity:          mins  63525;     Gait Training:           mins  15667;     Ultrasound:          mins  77349;    Electrical Stimulation:         mins  87321 ( );  Dry Needling          mins self-pay    Timed Treatment:   40   mins   Total Treatment:     60   mins    Fede Winter PT  [place] Lic. # [id number]  Physical Therapist

## 2018-04-30 ENCOUNTER — TREATMENT (OUTPATIENT)
Dept: PHYSICAL THERAPY | Facility: CLINIC | Age: 68
End: 2018-04-30

## 2018-04-30 DIAGNOSIS — M25.511 PAIN OF RIGHT SHOULDER REGION: ICD-10-CM

## 2018-04-30 DIAGNOSIS — Z98.890 S/P RIGHT ROTATOR CUFF REPAIR: Primary | ICD-10-CM

## 2018-04-30 PROCEDURE — 97110 THERAPEUTIC EXERCISES: CPT | Performed by: PHYSICAL THERAPIST

## 2018-04-30 NOTE — PROGRESS NOTES
Physical Therapy Daily Progress Note  Visit: 23    Melany Lloyd reports: I didn't do my HEP much this weekend and I did a lot of gardening.      Subjective     Objective   See Exercise, Manual, and Modality Logs for complete treatment.       Assessment & Plan     Assessment  Assessment details: The pt continues to improve with her (R) shdr ROM, strength and function demonstrated with her improving yasmeen for exercise.      Plan  Plan details: Progress ROM / strengthening /stabilization / functional activity as tolerated                   Manual Therapy:         mins  79730;  Therapeutic Exercise:    30/60     mins  88287;     Neuromuscular Wm:        mins  76788;    Therapeutic Activity:          mins  86454;     Gait Training:           mins  95426;     Ultrasound:          mins  69939;    Electrical Stimulation:         mins  12261 ( );  Dry Needling          mins self-pay    Timed Treatment:   30   mins   Total Treatment:     60   mins    Fede Winter PT  [place] Lic. # [id number]  Physical Therapist

## 2018-05-01 ENCOUNTER — OFFICE VISIT (OUTPATIENT)
Dept: NEUROLOGY | Facility: CLINIC | Age: 68
End: 2018-05-01

## 2018-05-01 VITALS
HEART RATE: 65 BPM | SYSTOLIC BLOOD PRESSURE: 118 MMHG | BODY MASS INDEX: 25.19 KG/M2 | HEIGHT: 68 IN | OXYGEN SATURATION: 96 % | DIASTOLIC BLOOD PRESSURE: 72 MMHG | WEIGHT: 166.2 LBS

## 2018-05-01 DIAGNOSIS — R90.89 ABNORMAL MRA, BRAIN: ICD-10-CM

## 2018-05-01 DIAGNOSIS — M50.122 CERVICAL DISC DISORDER AT C5-C6 LEVEL WITH RADICULOPATHY: ICD-10-CM

## 2018-05-01 DIAGNOSIS — G25.0 FAMILIAL TREMOR: Primary | ICD-10-CM

## 2018-05-01 PROCEDURE — 99205 OFFICE O/P NEW HI 60 MIN: CPT | Performed by: PSYCHIATRY & NEUROLOGY

## 2018-05-01 RX ORDER — NAPROXEN SODIUM 220 MG
220 TABLET ORAL 2 TIMES DAILY PRN
COMMUNITY
End: 2019-02-12

## 2018-05-01 NOTE — PATIENT INSTRUCTIONS
Essential Tremor  A tremor is trembling or shaking that you cannot control. Most tremors affect the hands or arms. Tremors can also affect the head, vocal cords, face, and other parts of the body.  Essential tremor is a tremor without a known cause.  What are the causes?  Essential tremor has no known cause.  What increases the risk?  You may be at greater risk of essential tremor if:  · You have a family member with essential tremor.  · You are age 40 or older.  · You take certain medicines.  What are the signs or symptoms?  The main sign of a tremor is uncontrolled and unintentional rhythmic shaking of a body part.  · You may have difficulty eating with a spoon or fork.  · You may have difficulty writing.  · You may nod your head up and down or side to side.  · You may have a quivering voice.  Your tremors:  · May get worse over time.  · May come and go.  · May be more noticeable on one side of your body.  · May get worse due to stress, fatigue, caffeine, and extreme heat or cold.  How is this diagnosed?  Your health care provider can diagnose essential tremor based on your symptoms, medical history, and a physical examination. There is no single test to diagnose an essential tremor. However, your health care provider may perform a variety of tests to rule out other conditions. Tests may include:  · Blood and urine tests.  · Imaging studies of your brain, such as:  ¨ CT scan.  ¨ MRI.  · A test that measures involuntary muscle movement (electromyogram).  How is this treated?  Your tremors may go away without treatment. Mild tremors may not need treatment if they do not affect your day-to-day life. Severe tremors may need to be treated using one or a combination of the following options:  · Medicines. This may include medicine that is injected.  · Lifestyle changes.  · Physical therapy.  Follow these instructions at home:  · Take medicines only as directed by your health care provider.  · Limit alcohol intake to no  more than 1 drink per day for nonpregnant women and 2 drinks per day for men. One drink equals 12 oz of beer, 5 oz of wine, or 1½ oz of hard liquor.  · Do not use any tobacco products, including cigarettes, chewing tobacco, or electronic cigarettes. If you need help quitting, ask your health care provider.  · Take medicines only as directed by your health care provider.  · Avoid extreme heat or cold.  · Limit the amount of caffeine you consume as directed by your health care provider.  · Try to get eight hours of sleep each night.  · Find ways to manage your stress, such as meditation or yoga.  · Keep all follow-up visits as directed by your health care provider. This is important. This includes any physical therapy visits.  Contact a health care provider if:  · You experience any changes in the location or intensity of your tremors.  · You start having a tremor after starting a new medicine.  · You have tremor with other symptoms such as:  ¨ Numbness.  ¨ Tingling.  ¨ Pain.  ¨ Weakness.  · Your tremor gets worse.  · Your tremor interferes with your daily life.  This information is not intended to replace advice given to you by your health care provider. Make sure you discuss any questions you have with your health care provider.  Document Released: 01/08/2016 Document Revised: 05/25/2017 Document Reviewed: 06/15/2015  [employer] Interactive Patient Education © 2017 [employer]

## 2018-05-01 NOTE — PROGRESS NOTES
Subjective:     Patient ID: Melany Lloyd is a 67 y.o. female.    History of Present Illness  The following portions of the patient's history were reviewed and updated as appropriate: allergies, current medications, past family history, past medical history, past social history, past surgical history and problem list.    Familial tremor: Mrs. Lloyd is a 67-year-old right-handed woman who had successful surgery on January 9 of this year for rotator cuff repair.  She began physical therapy the following month.  Shortly thereafter she noted an occasional postural tremor of the right hand when she try to use silverware.  Her  has not noted a tremor at any other time.  Several weeks later a similar tremor was noted on the left hand.  There is no history of tremor of the head or voice or legs nor is there history of abnormal gait falling bradykinesia abnormal handwriting hypophonia difficulty standing from a chair.      FHX-  There is been no tremor of any other part of the body.  Her sister has tremors of unknown type.  Her mother had tremor and was later diagnosed with possible Parkinson's disease.  There may also be a maternal aunt with tremor.    Cervical radiculopathy: In 2003 she underwent a cervical MRI for a history of neck and left arm pain.  The test was abnormal: Disc disease at C4-5-6-7 causing some flattening of the cord at the higher levels and foraminal stenosis left greater than right at the lower 2 levels.  She underwent treatment with therapy and did well.  She does not have neck pain currently.  However she has noted over the last several weeks a radicular pain of electrical sensation shooting into the dorsum of her right forearm and hand with gripping.  This can occur daily.  It may also be due to tendinitis by her description.  She can't definitely say it radiates from the shoulder or the neck.    Abnormal brain MRA-In May 2006 she was hospitalized for hyponatremia, thought to be due to  polydipsia.  She underwent a brain MRI and MRA and was seen by Dr. Sedrick Devine.  I do not have the MRI report.  The MRA showed no evidence of an aneurysm intracranially.  Motion artifact obstructed the flow in the upper cervical left internal carotid artery.  No follow-up tests were ordered by Dr. Devine.  She has had no history of TIA or .    She has lupus and is treated by Dr. Luz Maria Scales with Plaquenil.  She also has some symptoms of fibromyalgia.    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  She does not drink excessive alcohol and does not smoke.  Review of Systems   Constitutional: Positive for fatigue. Negative for activity change and appetite change.   HENT: Positive for hearing loss, mouth sores, sinus pressure and tinnitus. Negative for ear pain and facial swelling.    Eyes: Positive for photophobia, pain and visual disturbance.   Respiratory: Negative for choking, chest tightness and shortness of breath.    Cardiovascular: Negative for chest pain, palpitations and leg swelling.   Gastrointestinal: Negative for abdominal pain, constipation and nausea.   Endocrine: Positive for cold intolerance. Negative for polydipsia, polyphagia and polyuria.   Genitourinary: Positive for enuresis. Negative for difficulty urinating and urgency.   Musculoskeletal: Positive for arthralgias, back pain, gait problem, joint swelling, neck pain and neck stiffness.   Skin: Negative for color change, rash and wound.   Allergic/Immunologic: Positive for environmental allergies. Negative for food allergies and immunocompromised state.   Neurological: Positive for dizziness, tremors, weakness and light-headedness. Negative for seizures, syncope, facial asymmetry, speech difficulty, numbness and headaches.   Hematological: Negative for adenopathy. Bruises/bleeds easily.   Psychiatric/Behavioral: Positive for decreased concentration and sleep disturbance. Negative for agitation, behavioral problems,  confusion, dysphoric mood, hallucinations, self-injury and suicidal ideas. The patient is not nervous/anxious and is not hyperactive.         Objective:    Neurologic Exam  This patient was well-developed, well-nourished and in no acute distress.  Rises from a chair without difficulty and has no evidence of bradykinesia      GAIT:  Gait, station, heel, toe and tandem walk were normal.  There was no drift of the arms.  Romberg’s sign was not present.      VASCULAR: The carotid arteries had normal upstroke to palpation without bruits.  The vertebral arteries were without bruits.  The radial pulses were equal and without delay.   Pedal pulses were normal.  The extremities were without cyanosis, clubbing or edema.    MENTAL STATUS: This patient was alert and oriented to person, place, time and situation.  Recent and remote memory -  intact.  Attention span, fund of knowledge and concentration were normal.  Comprehension, naming, reading, repetition, and language were normal.  Fund of knowledge was intact.    CRANIAL NERVES:  Olfaction-not tested.  Visual fields full in all quadrants to confrontation.  The pupils were equally round and reactive to light at 5 mm.  There was no evidence of a Jey Jamal pupil.  Funduscopic exam revealed no papilledema, hemorrhage or exudate.  The gaze was conjugate. The vertical and horizontal eye movements were full without nystagmus.  There was no facial weakness.  There was no facial sensory loss to pinprick bilaterally.  Hearing was normal for light finger rub and casual speech.  Speech is normal without dysarthria.  The neck is supple and strength is normal in rotation, flexion and extension.  Tongue and palate movements are normal.    MOTOR UPPER EXTREMTIES:   Bilaterally the deltoid, biceps, triceps, wrist extensors, intrinsic hand muscles, and  were grade 5 and normal.  Bulk, tone and strength of these muscles were normal.  With the hands outstretched there is a very mild  action tremor bilaterally.  Her ability to write and draw a spiral is unimpaired.  There is no resting tremor and no cogwheel rigidity.    MOTOR LOWER EXTREMITIES: Bilaterally the hip flexors, hip abductors, knee flexors and extensors, ankle plantar flexors and dorsiflexors were grade 5 with normal. Bulk, tone and strength of these muscles were normal without abnormal movements.  DEEP TENDON REFLEXES:  Bilateral biceps, triceps, and brachioradialis reflexes were grade 1 symmetric.  Bilateral knee, hamstrings and ankle reflexes were grade 2 and symmetric.  The plantar responses were downgoing.  Ankle clonus was not present.    CEREBELLAR:  Finger to nose, rapid finger opposition, and heel-to-shin tests were performed normally, bilaterally.    MUSCULOSKELETAL:  Normal range of motion of the neck is noted.  There was no back pain with straight leg raise.  Internal and external rotation of the hips was normal.     SENSORY: There was normal upper and lower extremity sensation to vibration, proprioception, and pinprick.  HIGHER CORTICAL FUNCTION: There were no aphasic or apraxic errors.  Visual fields were intact to confrontation.      Physical Exam   Constitutional: She appears well-developed and well-nourished.   Neck: Normal range of motion. Neck supple.   Cardiovascular: Normal rate.    Pulmonary/Chest: Effort normal.   Psychiatric: She has a normal mood and affect. Her behavior is normal. Judgment and thought content normal.   Nursing note and vitals reviewed.      Assessment/Plan:       Problems Addressed this Visit        Unprioritized    Familial tremor - Primary    Abnormal MRA, brain    Cervical disc disorder at C5-C6 level with radiculopathy      Other Visit Diagnoses    None.          The tremor is familial and was likely brought on by fatigue, poor sleep, all in relationship to her surgery.  She does not need treatment for this tremor but I advised her to check with her rheumatologist to make sure that her  thyroid studies do not reveal hyperthyroidism.  I looked at her recent labs and there is no evidence of anemia.    The MRA of the brain was normal without evidence of an aneurysm.  There was probably artifact affecting the flow through the left internal carotid.  I offered repeat scans or CTA.  She did not want any further tests.    Cervical disc disease: The disease was fairly severe on the 2003 MRI and may have advanced.  This may be causing some of the radiating pain into the right hand.  She is going to observe before agreeing to any further tests.  Tendinitis, also present with radiating pain on the dorsum of the hand.    No further labs were ordered and no follow-up is needed.    I spent 60 minutes face to face time with this patient with greater than 50% of the time for counseling care

## 2018-05-04 ENCOUNTER — TREATMENT (OUTPATIENT)
Dept: PHYSICAL THERAPY | Facility: CLINIC | Age: 68
End: 2018-05-04

## 2018-05-04 DIAGNOSIS — M25.511 PAIN OF RIGHT SHOULDER REGION: ICD-10-CM

## 2018-05-04 DIAGNOSIS — Z98.890 S/P RIGHT ROTATOR CUFF REPAIR: Primary | ICD-10-CM

## 2018-05-04 PROCEDURE — 97110 THERAPEUTIC EXERCISES: CPT | Performed by: PHYSICAL THERAPIST

## 2018-05-06 NOTE — PROGRESS NOTES
Physical Therapy Daily Progress Note  Visit: 24    Melany Lloyd reports: My (R) shdr is feeling stronger all the time.      Subjective     Objective   See Exercise, Manual, and Modality Logs for complete treatment.       Assessment & Plan     Assessment  Assessment details: Improved scapular mobility with scaption of the (R) UE.  Pt continues to improve.  Working to restore strength and endurance.      Plan  Plan details: 1 more week and re-eval for DC.                   Manual Therapy:         mins  39171;  Therapeutic Exercise:    30/60     mins  27634;     Neuromuscular Wm:        mins  20410;    Therapeutic Activity:          mins  50686;     Gait Training:           mins  38615;     Ultrasound:          mins  47612;    Electrical Stimulation:         mins  24435 ( );  Dry Needling          mins self-pay    Timed Treatment:   30   mins   Total Treatment:     60   mins    Fede Winter PT  [place] Lic. # [id number]  Physical Therapist

## 2018-05-07 ENCOUNTER — TREATMENT (OUTPATIENT)
Dept: PHYSICAL THERAPY | Facility: CLINIC | Age: 68
End: 2018-05-07

## 2018-05-07 DIAGNOSIS — M25.511 PAIN OF RIGHT SHOULDER REGION: ICD-10-CM

## 2018-05-07 DIAGNOSIS — Z98.890 S/P RIGHT ROTATOR CUFF REPAIR: Primary | ICD-10-CM

## 2018-05-07 PROCEDURE — G8986 CARRY D/C STATUS: HCPCS | Performed by: PHYSICAL THERAPIST

## 2018-05-07 PROCEDURE — G8984 CARRY CURRENT STATUS: HCPCS | Performed by: PHYSICAL THERAPIST

## 2018-05-07 PROCEDURE — 97110 THERAPEUTIC EXERCISES: CPT | Performed by: PHYSICAL THERAPIST

## 2018-05-07 NOTE — PROGRESS NOTES
Re-Assessment / Progress Note / Discharge Note    Patient: Melany Lloyd   : 1950  Diagnosis/ICD-10 Code:  S/P right rotator cuff repair [Z98.890]  Referring practitioner: Romeo Walker MD  Date of Initial Visit: Episode Type: THERAPY  Noted: 2018    Today's Date: 2018  Patient seen for 25 sessions.      Subjective:   Melany Lloyd reports: My (R) shdr is doing better and better.  I still have some weakness and occasional pain but I can tell it is steadily improving.      Subjective Questionnaire: QuickDASH: 20% perceived disability  Clinical Progress: improved  Home Program Compliance: Yes  Treatment has included: therapeutic exercise, neuromuscular re-education, manual therapy, electrical stimulation and cryotherapy      Subjective Evaluation    Pain  Pain scale at highest: 1/10 (R) Shoulder; 3/10 Biceps.         Objective       Active Range of Motion     Right Shoulder   Flexion: 174 degrees   Abduction: 174 degrees   External rotation 90°: 100 degrees  Internal rotation 90°: 55 degrees     Strength/Myotome Testing     Right Shoulder     Planes of Motion   Flexion: 4+   Abduction: 5   External rotation at 0°: 4+   Internal rotation at 0°: 5     Isolated Muscles   Biceps: 5   Triceps: 5      Assessment & Plan     Assessment  Assessment details: Melany has improved greatly with her (R) shdr since starting PT.  She has restored strength and ROM.  Her scapular mobility has improved greatly in the last 3 weeks with no latency in GH to Scap-Thoracic ratio, equal to contralateral side.  Skilled care is no longer warranted.  Pt has met all of her set goals at this time except overhead lifting, which should come in time.         Plan  Plan details: DC to HEP.      Progress toward previous goals: Partially Met    Goals  SHORT TERM GOALS:   1. Patient to be compliant with HEP and no diff. with sleeping. - MET  2. Increased (R) UE strength to 4-/5 with no pain> 4/10 to allow for household and work  activities. - MET  3. Pt to exhibit (R) shoulder active flexion / ABD to 135° in standing/sitting to assist with reaching overhead with less pain. - MET  4. Pt demonstrates improved posture in sitting and standing with minimal-no cues during treatment session. - MET    LONG TERM GOALS: 3 weeks  1. Pt score <35% perceived disability on DASH - MET  2. Pt. to exhibit (R) shoulder AROM to WFL (> 160° flex/abd. to allow for reaching overhead and behind back without pain limiting function. - MET  3. Pt to exhibit 4+/5 UE strength to allow for pushing/pulling and lifting >5 #to occur with pain <2/10. - MET  4. Pt able to reach overhead and lift 10# (B) x 10 to allow for return to doing work around home. - UNMET              PT Signature: Fede Winter, PT  [place] Lic. # [id number]        Based upon review of the patient's progress and therapy plan, it is my medical opinion that Melany Lloyd should be DC from physical therapy treatment at [facility]  [address]  [facility] [address] [phone].    Signature: __________________________________  Romeo Walker MD    Manual Therapy:         mins  24446;  Therapeutic Exercise:    45/60     mins  16068;     Neuromuscular Wm:        mins  25329;    Therapeutic Activity:          mins  79045;     Gait Training:           mins  79181;     Ultrasound:          mins  61654;    Electrical Stimulation:         mins  38949 ( );  Dry Needling          mins self-pay    Timed Treatment:   45   mins   Total Treatment:     60   mins

## 2018-06-17 ENCOUNTER — OFFICE VISIT (OUTPATIENT)
Dept: RETAIL CLINIC | Facility: CLINIC | Age: 68
End: 2018-06-17

## 2018-06-17 VITALS
SYSTOLIC BLOOD PRESSURE: 111 MMHG | RESPIRATION RATE: 18 BRPM | HEART RATE: 65 BPM | OXYGEN SATURATION: 98 % | TEMPERATURE: 98 F | DIASTOLIC BLOOD PRESSURE: 63 MMHG

## 2018-06-17 DIAGNOSIS — H10.9 CONJUNCTIVITIS OF BOTH EYES, UNSPECIFIED CONJUNCTIVITIS TYPE: Primary | ICD-10-CM

## 2018-06-17 PROCEDURE — 99213 OFFICE O/P EST LOW 20 MIN: CPT | Performed by: NURSE PRACTITIONER

## 2018-06-17 RX ORDER — POLYMYXIN B SULFATE AND TRIMETHOPRIM 1; 10000 MG/ML; [USP'U]/ML
1 SOLUTION OPHTHALMIC EVERY 4 HOURS
Qty: 10 ML | Refills: 0 | Status: SHIPPED | OUTPATIENT
Start: 2018-06-17 | End: 2018-06-22

## 2018-06-17 NOTE — PROGRESS NOTES
Subjective:     Melany Lloyd is a 67 y.o.     Conjunctivitis    The current episode started yesterday. The problem has been gradually worsening. Associated symptoms include ear discharge, headaches, rhinorrhea, sore throat, URI, eye discharge and eye redness (left). Pertinent negatives include no fever, no decreased vision and no eye itching. Eye pain: uncomfortable, left.         The following portions of the patient's history were reviewed and updated as appropriate: allergies, current medications, past family history, past medical history, past social history, past surgical history and problem list.      Review of Systems   Constitutional: Negative for fever.   HENT: Positive for ear discharge, postnasal drip, rhinorrhea, sinus pain ( mild), sinus pressure (mild) and sore throat.    Eyes: Positive for discharge and redness (left). Negative for itching and visual disturbance. Eye pain: uncomfortable, left.   Respiratory: Negative.    Cardiovascular: Negative.    Neurological: Positive for headaches.         Objective:      Physical Exam   HENT:   Head: Normocephalic and atraumatic.   Left Ear: Ear canal normal.   Mouth/Throat: Posterior oropharyngeal erythema (mild) present. No oropharyngeal exudate.   Mildly congested   Eyes: Lids are normal. Right eye exhibits discharge. Left eye exhibits no exudate. Right conjunctiva is injected. Left conjunctiva is injected.   Cardiovascular: Normal rate, regular rhythm and normal heart sounds.    Pulmonary/Chest: Effort normal and breath sounds normal.   Vitals reviewed.          Melany was seen today for conjunctivitis.    Diagnoses and all orders for this visit:    Conjunctivitis of both eyes, unspecified conjunctivitis type    Other orders  -     trimethoprim-polymyxin b (POLYTRIM) 06902-5.1 UNIT/ML-% ophthalmic solution; Administer 1 drop to both eyes Every 4 (Four) Hours for 5 days.

## 2018-06-17 NOTE — PATIENT INSTRUCTIONS
Bacterial Conjunctivitis  Bacterial conjunctivitis is an infection of the clear membrane that covers the white part of your eye and the inner surface of your eyelid (conjunctiva). When the blood vessels in your conjunctiva become inflamed, your eye becomes red or pink, and it will probably feel itchy. Bacterial conjunctivitis spreads very easily from person to person (is contagious). It also spreads easily from one eye to the other eye.  What are the causes?  This condition is caused by several common bacteria. You may get the infection if you come into close contact with another person who is infected. You may also come into contact with items that are contaminated with the bacteria, such as a face towel, contact lens solution, or eye makeup.  What increases the risk?  This condition is more likely to develop in people who:  · Are exposed to other people who have the infection.  · Wear contact lenses.  · Have a sinus infection.  · Have had a recent eye injury or surgery.  · Have a weak body defense system (immune system).  · Have a medical condition that causes dry eyes.    What are the signs or symptoms?  Symptoms of this condition include:  · Eye redness.  · Tearing or watery eyes.  · Itchy eyes.  · Burning feeling in your eyes.  · Thick, yellowish discharge from an eye. This may turn into a crust on the eyelid overnight and cause your eyelids to stick together.  · Swollen eyelids.  · Blurred vision.    How is this diagnosed?  Your health care provider can diagnose this condition based on your symptoms and medical history. Your health care provider may also take a sample of discharge from your eye to find the cause of your infection. This is rarely done.  How is this treated?  Treatment for this condition includes:  · Antibiotic eye drops or ointment to clear the infection more quickly and prevent the spread of infection to others.  · Oral antibiotic medicines to treat infections that do not respond to drops or  ointments, or last longer than 10 days.  · Cool, wet cloths (cool compresses) placed on the eyes.  · Artificial tears applied 2-6 times a day.    Follow these instructions at home:  Medicines  · Take or apply your antibiotic medicine as told by your health care provider. Do not stop taking or applying the antibiotic even if you start to feel better.  · Take or apply over-the-counter and prescription medicines only as told by your health care provider.  · Be very careful to avoid touching the edge of your eyelid with the eye drop bottle or the ointment tube when you apply medicines to the affected eye. This will keep you from spreading the infection to your other eye or to other people.  Managing discomfort  · Gently wipe away any drainage from your eye with a warm, wet washcloth or a cotton ball.  · Apply a cool, clean washcloth to your eye for 10-20 minutes, 3-4 times a day.  General instructions  · Do not wear contact lenses until the inflammation is gone and your health care provider says it is safe to wear them again. Ask your health care provider how to sterilize or replace your contact lenses before you use them again. Wear glasses until you can resume wearing contacts.  · Avoid wearing eye makeup until the inflammation is gone. Throw away any old eye cosmetics that may be contaminated.  · Change or wash your pillowcase every day.  · Do not share towels or washcloths. This may spread the infection.  · Wash your hands often with soap and water. Use paper towels to dry your hands.  · Avoid touching or rubbing your eyes.  · Do not drive or use heavy machinery if your vision is blurred.  Contact a health care provider if:  · You have a fever.  · Your symptoms do not get better after 10 days.  Get help right away if:  · You have a fever and your symptoms suddenly get worse.  · You have severe pain when you move your eye.  · You have facial pain, redness, or swelling.  · You have sudden loss of vision.  This  information is not intended to replace advice given to you by your health care provider. Make sure you discuss any questions you have with your health care provider.  Document Released: 12/18/2006 Document Revised: 04/27/2017 Document Reviewed: 09/29/2016  [employer] Interactive Patient Education © 2017 [employer]

## 2018-06-21 ENCOUNTER — TELEPHONE (OUTPATIENT)
Dept: INTERNAL MEDICINE | Facility: CLINIC | Age: 68
End: 2018-06-21

## 2018-06-21 DIAGNOSIS — M85.9 DISORDER OF BONE DENSITY AND STRUCTURE, UNSPECIFIED: ICD-10-CM

## 2018-06-21 DIAGNOSIS — Z12.31 SCREENING MAMMOGRAM, ENCOUNTER FOR: Primary | ICD-10-CM

## 2018-07-02 ENCOUNTER — HOSPITAL ENCOUNTER (OUTPATIENT)
Dept: MAMMOGRAPHY | Facility: HOSPITAL | Age: 68
Discharge: HOME | End: 2018-07-02
Attending: INTERNAL MEDICINE | Admitting: INTERNAL MEDICINE

## 2018-07-02 ENCOUNTER — HOSPITAL ENCOUNTER (OUTPATIENT)
Dept: BONE DENSITY | Facility: HOSPITAL | Age: 68
Discharge: HOME | End: 2018-07-02
Attending: INTERNAL MEDICINE

## 2018-07-02 DIAGNOSIS — Z12.31 SCREENING MAMMOGRAM, ENCOUNTER FOR: ICD-10-CM

## 2018-07-02 PROCEDURE — 77067 SCR MAMMO BI INCL CAD: CPT

## 2018-07-02 PROCEDURE — 77080 DXA BONE DENSITY AXIAL: CPT

## 2018-09-14 ENCOUNTER — HOSPITAL ENCOUNTER (OUTPATIENT)
Dept: CT IMAGING | Facility: HOSPITAL | Age: 68
Discharge: HOME | End: 2018-09-14
Attending: INTERNAL MEDICINE | Admitting: INTERNAL MEDICINE

## 2018-09-14 PROCEDURE — 71250 CT THORAX DX C-: CPT

## 2018-09-17 ENCOUNTER — TELEPHONE (OUTPATIENT)
Dept: INTERNAL MEDICINE | Facility: CLINIC | Age: 68
End: 2018-09-17

## 2018-09-17 NOTE — TELEPHONE ENCOUNTER
Patient called regarding her CT scan and asked if there was anything to be concerned about regarding her splenic arterial aneurysm. Please advise.    It needs to be followed but it is small and requires no treatment.

## 2018-10-02 ENCOUNTER — OFFICE VISIT (OUTPATIENT)
Dept: ORTHOPEDIC SURGERY | Facility: CLINIC | Age: 68
End: 2018-10-02

## 2018-10-02 VITALS — HEIGHT: 67 IN | BODY MASS INDEX: 26.06 KG/M2 | TEMPERATURE: 98.3 F | WEIGHT: 166 LBS

## 2018-10-02 DIAGNOSIS — G89.29 CHRONIC PAIN OF BOTH KNEES: Primary | ICD-10-CM

## 2018-10-02 DIAGNOSIS — M25.562 CHRONIC PAIN OF BOTH KNEES: Primary | ICD-10-CM

## 2018-10-02 DIAGNOSIS — M25.561 CHRONIC PAIN OF BOTH KNEES: Primary | ICD-10-CM

## 2018-10-02 DIAGNOSIS — M17.12 PRIMARY OSTEOARTHRITIS OF LEFT KNEE: ICD-10-CM

## 2018-10-02 PROCEDURE — 99213 OFFICE O/P EST LOW 20 MIN: CPT | Performed by: ORTHOPAEDIC SURGERY

## 2018-10-02 PROCEDURE — 73562 X-RAY EXAM OF KNEE 3: CPT | Performed by: ORTHOPAEDIC SURGERY

## 2018-10-02 NOTE — PROGRESS NOTES
Patient: Melany Lloyd  YOB: 1950 68 y.o. female  Medical Record Number: [id number]    Chief Complaints:   Chief Complaint   Patient presents with   • Left Knee - Establish Care       History of Present Illness:Melany Lloyd is a 68 y.o. female who presents for follow-up of  bilateral knee pain.  She right total knee replacement sunrise intermittent mild knee pain with hyperflexion and with rotational activities but overall does very well.  She also has some evidence of knee arthritis of the left knee which causes a moderate diffuse ache which has begun to limit her basic activities.  She had a Synvisc injection in the many years ago which seemed to help for a year or 2.    Allergies:   Allergies   Allergen Reactions   • Penicillins Itching and Rash   • Red Dye Itching and Rash       Medications:   Current Outpatient Prescriptions   Medication Sig Dispense Refill   • Cholecalciferol (VITAMIN D) 2000 UNITS capsule Take 1,000 Units by mouth Daily.     • Flaxseed, Linseed, (FLAX SEED OIL PO) Take 2 capsules by mouth daily.     • hydroxychloroquine (PLAQUENIL) 200 MG tablet Take 200 mg by mouth 2 (two) times a day.     • Omega-3 Fatty Acids (FISH OIL PO) Take 1 tablet by mouth Daily.     • acetaminophen (TYLENOL) 650 MG 8 hr tablet Take 650 mg by mouth every 8 (eight) hours as needed for mild pain (1-3).     • diclofenac (VOLTAREN) 1 % gel gel Apply 4 g topically 4 (Four) Times a Day As Needed.     • methocarbamol (ROBAXIN) 500 MG tablet Take 500 mg by mouth 4 (four) times a day as needed for muscle spasms (1-2 tablets 4 times daily).     • naproxen sodium (ALEVE) 220 MG tablet Take 220 mg by mouth 2 (Two) Times a Day As Needed.       No current facility-administered medications for this visit.          The following portions of the patient's history were reviewed and updated as appropriate: allergies, current medications, past family history, past medical history, past social history, past surgical  "history and problem list.    Review of Systems:   A 14 point review of systems was performed. All systems negative except pertinent positives/negative listed in HPI above    Physical Exam:   Vitals:    10/02/18 1606   Temp: 98.3 °F (36.8 °C)   TempSrc: Temporal Artery    Weight: 75.3 kg (166 lb)   Height: 170.2 cm (67\")   PainSc:   4   PainLoc: Knee  Comment: left       General: A and O x 3, ASA, NAD    SCLERA:    Normal    DENTITION:   Normal  Knee:  left    ALIGNMENT:     MILD Varus  ,   Patella  tracks  midline    GAIT:    Mildly Antalgic    SKIN:    No abnormality    RANGE OF MOTION:   0  -  130   DEG    STRENGTH:   4+  / 5    LIGAMENTS:    No varus / valgus instability.   Negative  Lachman.    MENISCUS:     Negative   Shira       DISTAL PULSES:    Paplable    DISTAL SENSATION :   Intact    LYMPHATICS:     No   lymphadenopathy    OTHER:          - No   effusion      - No Crepitance with ROM       Radiology:  Xrays 3views both knees  (ap,lateral, sunrise) were ordered and reviewed for evaluation of knee pain demonstratingmoderate joint space narrowing and a well positioned knee replacement without evidence of wear, loosening or osteolysis  todays xrays were compared to previous xrays and demonstrate no change    Assessment/Plan:  Right total knee replacement about 5 years out with some intermittent symptoms mechanically and radiographically things look fine slightly think continued quad exercises would be appropriate I have no treatment recommendations from a knee standpoint    Left knee moderate osteoarthritis.  She responded very well to Synvisc years ago and I think it would be reasonable to try that again.  I put an order in for that M will have her see Margot in a week or 2 for a Synvisc one injection  "

## 2018-10-16 ENCOUNTER — CLINICAL SUPPORT (OUTPATIENT)
Dept: ORTHOPEDIC SURGERY | Facility: CLINIC | Age: 68
End: 2018-10-16

## 2018-10-16 VITALS — BODY MASS INDEX: 25.31 KG/M2 | HEIGHT: 68 IN | WEIGHT: 167 LBS

## 2018-10-16 DIAGNOSIS — M17.12 PRIMARY OSTEOARTHRITIS OF LEFT KNEE: ICD-10-CM

## 2018-10-16 PROCEDURE — 20610 DRAIN/INJ JOINT/BURSA W/O US: CPT | Performed by: NURSE PRACTITIONER

## 2018-10-16 RX ORDER — LIDOCAINE HYDROCHLORIDE 10 MG/ML
2 INJECTION, SOLUTION EPIDURAL; INFILTRATION; INTRACAUDAL; PERINEURAL
Status: COMPLETED | OUTPATIENT
Start: 2018-10-16 | End: 2018-10-16

## 2018-10-16 RX ADMIN — LIDOCAINE HYDROCHLORIDE 2 ML: 10 INJECTION, SOLUTION EPIDURAL; INFILTRATION; INTRACAUDAL; PERINEURAL at 10:56

## 2018-10-16 NOTE — PROGRESS NOTES
10/16/2018    Melany Lloyd is here today for worsening knee pain. Pt has undergone injection of the knee in the past with good resolution of symptoms. Pt is requesting a repeat injection.     KNEE Injection Procedure Note:    Large Joint Arthrocentesis  Date/Time: 10/16/2018 10:56 AM  Consent given by: patient  Site marked: site marked  Timeout: Immediately prior to procedure a time out was called to verify the correct patient, procedure, equipment, support staff and site/side marked as required   Supporting Documentation  Indications: pain   Procedure Details  Location: knee - L knee  Preparation: Patient was prepped and draped in the usual sterile fashion  Needle gauge: 21 gauge   Approach: anterolateral  Medications administered: 2 mL lidocaine PF 1% 1 %; 48 mg hylan 48 MG/6ML  Patient tolerance: patient tolerated the procedure well with no immediate complications      prior to injection risks were discussed including pain and infection.  Patient verbalized understanding would like to proceed with injection    At the conclusion of the injection I discussed the importance of continued quad strengthening exercises on a daily basis. I will see the patient back if the symptoms should fail to improve or worsen.    Lillian Portillo APRN  10/16/2018

## 2019-02-12 ENCOUNTER — OFFICE VISIT (OUTPATIENT)
Dept: FAMILY MEDICINE CLINIC | Facility: CLINIC | Age: 69
End: 2019-02-12

## 2019-02-12 VITALS
HEIGHT: 68 IN | HEART RATE: 70 BPM | WEIGHT: 169 LBS | TEMPERATURE: 97.7 F | DIASTOLIC BLOOD PRESSURE: 60 MMHG | SYSTOLIC BLOOD PRESSURE: 111 MMHG | BODY MASS INDEX: 25.61 KG/M2 | RESPIRATION RATE: 16 BRPM

## 2019-02-12 DIAGNOSIS — M32.9 SYSTEMIC LUPUS ERYTHEMATOSUS, UNSPECIFIED SLE TYPE, UNSPECIFIED ORGAN INVOLVEMENT STATUS (HCC): ICD-10-CM

## 2019-02-12 DIAGNOSIS — E66.3 OVERWEIGHT (BMI 25.0-29.9): ICD-10-CM

## 2019-02-12 DIAGNOSIS — M85.89 OSTEOPENIA OF MULTIPLE SITES: Primary | ICD-10-CM

## 2019-02-12 DIAGNOSIS — I72.8 ANEURYSM OF OTHER SPECIFIED ARTERIES (HCC): ICD-10-CM

## 2019-02-12 DIAGNOSIS — R91.1 SOLITARY PULMONARY NODULE ON LUNG CT: ICD-10-CM

## 2019-02-12 PROCEDURE — 99204 OFFICE O/P NEW MOD 45 MIN: CPT | Performed by: FAMILY MEDICINE

## 2019-02-12 NOTE — PROGRESS NOTES
Subjective   Melany Lloyd is a 68 y.o. female.     68-year-old female presents today to establish care.  Past medical history of GERD, fibromyalgia, familial tremor, osteoporosis, degenerative disc disease, cervical radiculopathy, SLE.    Flu vaccine: Up-to-date  Pneumonia vaccine: Up-to-date  Tetanus vaccine: Up-to-date  Zostavax: Up-to-date  Mammogram: Up-to-date; next due July 2020  DEXA scan: Up-to-date; next due July 2020; Osteopenia: was on Fosamax 10-15 years ago; had some improvement and then was stopped.   Colonoscopy: Up-to-date next due June 2021    Follows with rheumatology sicca syndrome, SLE and rheumatoid arthritis. Sees her again in June 2019.     Follows with orthopedics for osteoarthritis.    Aneurysm: patient thinks this was found incidentaly in the splenic artery. Last PCP was getting scans once a year.  I reviewed CT scan done last year which showed stable pulmonary nodule and splenic artery aneurysm.  Report recommended repeat CT chest in 3-6 months to follow-up on pulmonary nodule.  Patient denies having one done since January 2018.         PHQ-2/PHQ-9 Depression Screening 2/12/2019   Little interest or pleasure in doing things 0   Feeling down, depressed, or hopeless 1   Trouble falling or staying asleep, or sleeping too much -   Feeling tired or having little energy -   Poor appetite or overeating -   Feeling bad about yourself - or that you are a failure or have let yourself or your family down -   Trouble concentrating on things, such as reading the newspaper or watching television -   Moving or speaking so slowly that other people could have noticed. Or the opposite - being so fidgety or restless that you have been moving around a lot more than usual -   Thoughts that you would be better off dead, or of hurting yourself in some way -   Total Score 1   If you checked off any problems, how difficult have these problems made it for you to do your work, take care of things at home, or get  along with other people? -       The following portions of the patient's history were reviewed and updated as appropriate: allergies, current medications, past family history, past medical history, past social history, past surgical history and problem list.    Review of Systems   Constitutional: Negative for activity change, appetite change, chills and fever.   HENT: Negative for congestion, sinus pressure and sore throat.    Eyes: Negative for blurred vision and double vision.   Respiratory: Negative for cough, chest tightness and shortness of breath.    Cardiovascular: Negative for chest pain.   Gastrointestinal: Negative for abdominal pain, blood in stool, constipation and diarrhea.   Genitourinary: Negative for dysuria.   Musculoskeletal: Positive for arthralgias and myalgias.   Neurological: Negative for dizziness and headache.   Psychiatric/Behavioral: The patient is not nervous/anxious.        Objective   Physical Exam   Constitutional: She is oriented to person, place, and time. She appears well-developed and well-nourished.   HENT:   Head: Normocephalic and atraumatic.   Mouth/Throat: Mucous membranes are normal.   Eyes: Conjunctivae and EOM are normal. Pupils are equal, round, and reactive to light.   Cardiovascular: Normal rate and regular rhythm.   No lower extremity edema   Pulmonary/Chest: Effort normal and breath sounds normal. No respiratory distress. She has no decreased breath sounds.   Musculoskeletal: Normal range of motion.   Lymphadenopathy:        Right cervical: No superficial cervical adenopathy present.       Left cervical: No superficial cervical adenopathy present.   Neurological: She is alert and oriented to person, place, and time.   Psychiatric: She has a normal mood and affect. Her speech is normal.       Assessment/Plan     Melany was seen today for establish care.    Diagnoses and all orders for this visit:    Osteopenia of multiple sites  -     Comprehensive Metabolic Panel;  Future  -     Vitamin D 25 Hydroxy; Future    Systemic lupus erythematosus, unspecified SLE type, unspecified organ involvement status (CMS/HCC)  -     TSH Rfx On Abnormal To Free T4; Future    Aneurysm of other specified arteries (CMS/HCC)  -     CT Chest Without Contrast; Future    Solitary pulmonary nodule on lung CT  -     CT Chest Without Contrast; Future    Overweight (BMI 25.0-29.9)  -     Lipid Panel; Future      Follow-up CT chest is ordered above.  Patient return to clinic in a few weeks for Medicare wellness visit.  Will get labs done 1 week prior to this; this will include CMP, lipid panel, TSH, vitamin D

## 2019-02-26 ENCOUNTER — HOSPITAL ENCOUNTER (OUTPATIENT)
Dept: CT IMAGING | Facility: HOSPITAL | Age: 69
Discharge: HOME | End: 2019-02-26
Admitting: FAMILY MEDICINE

## 2019-02-26 DIAGNOSIS — I72.8 ANEURYSM OF OTHER SPECIFIED ARTERIES (HCC): ICD-10-CM

## 2019-02-26 DIAGNOSIS — R91.1 SOLITARY PULMONARY NODULE ON LUNG CT: ICD-10-CM

## 2019-02-26 PROCEDURE — 71250 CT THORAX DX C-: CPT

## 2019-02-28 DIAGNOSIS — R91.1 SOLITARY PULMONARY NODULE: ICD-10-CM

## 2019-02-28 DIAGNOSIS — I72.8 ANEURYSM OF OTHER SPECIFIED ARTERIES (HCC): Primary | ICD-10-CM

## 2019-02-28 NOTE — PROGRESS NOTES
Please inform patient CT chest shows that pulmonary nodule in right upper lobe is stable.  Splenic artery aneurysm is also stable.  Recommended to repeat with follow-up CT of chest in 6-9 months.  Orders have been placed so we can get this scheduled.

## 2019-03-04 ENCOUNTER — RESULTS ENCOUNTER (OUTPATIENT)
Dept: FAMILY MEDICINE CLINIC | Facility: CLINIC | Age: 69
End: 2019-03-04

## 2019-03-04 DIAGNOSIS — E66.3 OVERWEIGHT (BMI 25.0-29.9): ICD-10-CM

## 2019-03-04 DIAGNOSIS — M85.89 OSTEOPENIA OF MULTIPLE SITES: ICD-10-CM

## 2019-03-04 DIAGNOSIS — M32.9 SYSTEMIC LUPUS ERYTHEMATOSUS, UNSPECIFIED SLE TYPE, UNSPECIFIED ORGAN INVOLVEMENT STATUS (HCC): ICD-10-CM

## 2019-03-08 LAB
25(OH)D3+25(OH)D2 SERPL-MCNC: 26.7 NG/ML (ref 30–100)
ALBUMIN SERPL-MCNC: 4.6 G/DL (ref 3.5–5.2)
ALBUMIN/GLOB SERPL: 1.6 G/DL
ALP SERPL-CCNC: 49 U/L (ref 39–117)
ALT SERPL-CCNC: 27 U/L (ref 1–33)
AST SERPL-CCNC: 43 U/L (ref 1–32)
BILIRUB SERPL-MCNC: 0.5 MG/DL (ref 0.1–1.2)
BUN SERPL-MCNC: 16 MG/DL (ref 8–23)
BUN/CREAT SERPL: 25 (ref 7–25)
CALCIUM SERPL-MCNC: 10.2 MG/DL (ref 8.6–10.5)
CHLORIDE SERPL-SCNC: 99 MMOL/L (ref 98–107)
CHOLEST SERPL-MCNC: 241 MG/DL (ref 0–200)
CO2 SERPL-SCNC: 29 MMOL/L (ref 22–29)
CREAT SERPL-MCNC: 0.64 MG/DL (ref 0.57–1)
GLOBULIN SER CALC-MCNC: 2.8 GM/DL
GLUCOSE SERPL-MCNC: 107 MG/DL (ref 65–99)
HDLC SERPL-MCNC: 101 MG/DL (ref 40–60)
LDLC SERPL CALC-MCNC: 127 MG/DL (ref 0–100)
POTASSIUM SERPL-SCNC: 4.5 MMOL/L (ref 3.5–5.2)
PROT SERPL-MCNC: 7.4 G/DL (ref 6–8.5)
SODIUM SERPL-SCNC: 139 MMOL/L (ref 136–145)
TRIGL SERPL-MCNC: 67 MG/DL (ref 0–150)
TSH SERPL DL<=0.005 MIU/L-ACNC: 2.7 MIU/ML (ref 0.27–4.2)
VLDLC SERPL CALC-MCNC: 13.4 MG/DL (ref 5–40)

## 2019-03-14 DIAGNOSIS — R74.8 ELEVATED LIVER ENZYMES: Primary | ICD-10-CM

## 2019-03-14 NOTE — PROGRESS NOTES
Please inform patient her labs were normal except for the following:   One of her liver enzymes was slightly elevated. This can be transient. We can recheck in 1 month to ensure it comes down.     Vitamin D was very mildly decreased. She is already on supplementation. If she is not having significant fatigue, we can continue current dosage and recheck in 3 months.      Your lipid panel showed mild elevations in total cholesterol and LDL. Based on this info your 10 year risk for cardiovascular disease is 4.1%. We do not need to start medication for cholesterol at this point. Work on diet and exercise.

## 2019-03-15 ENCOUNTER — OFFICE VISIT (OUTPATIENT)
Dept: FAMILY MEDICINE CLINIC | Facility: CLINIC | Age: 69
End: 2019-03-15

## 2019-03-15 VITALS
HEART RATE: 60 BPM | OXYGEN SATURATION: 97 % | SYSTOLIC BLOOD PRESSURE: 112 MMHG | TEMPERATURE: 98.4 F | RESPIRATION RATE: 16 BRPM | WEIGHT: 167 LBS | HEIGHT: 68 IN | DIASTOLIC BLOOD PRESSURE: 68 MMHG | BODY MASS INDEX: 25.31 KG/M2

## 2019-03-15 DIAGNOSIS — F32.A MILD EPISODE OF DEPRESSION: ICD-10-CM

## 2019-03-15 DIAGNOSIS — Z00.00 MEDICARE ANNUAL WELLNESS VISIT, SUBSEQUENT: Primary | ICD-10-CM

## 2019-03-15 DIAGNOSIS — E78.5 ELEVATED LIPIDS: ICD-10-CM

## 2019-03-15 DIAGNOSIS — E55.9 VITAMIN D DEFICIENCY: ICD-10-CM

## 2019-03-15 PROCEDURE — G0439 PPPS, SUBSEQ VISIT: HCPCS | Performed by: FAMILY MEDICINE

## 2019-03-15 NOTE — PROGRESS NOTES
QUICK REFERENCE INFORMATION:  The ABCs of the Annual Wellness Visit    Subsequent Medicare Wellness Visit    HEALTH RISK ASSESSMENT    1950    Recent Hospitalizations:  No hospitalization(s) within the last year..        Current Medical Providers:  Patient Care Team:  Alma Jack MD as PCP - General (Family Medicine)  Romeo Walker MD as PCP - Claims Attributed  Luz Maria Espinosa MD as Consulting Physician (Rheumatology)  Luz Maria Espinosa MD as Consulting Physician (Rheumatology)  Christian Sanders MD as Surgeon (Orthopedic Surgery)  Page Bro MD as Consulting Physician (Ophthalmology)        Smoking Status:  Social History     Tobacco Use   Smoking Status Never Smoker   Smokeless Tobacco Never Used       Alcohol Consumption:  Social History     Substance and Sexual Activity   Alcohol Use Yes    Comment: Occasional.        Depression Screen:   PHQ-2/PHQ-9 Depression Screening 3/15/2019   Little interest or pleasure in doing things 0   Feeling down, depressed, or hopeless 1   Trouble falling or staying asleep, or sleeping too much 1   Feeling tired or having little energy 1   Poor appetite or overeating 1   Feeling bad about yourself - or that you are a failure or have let yourself or your family down 0   Trouble concentrating on things, such as reading the newspaper or watching television 1   Moving or speaking so slowly that other people could have noticed. Or the opposite - being so fidgety or restless that you have been moving around a lot more than usual 0   Thoughts that you would be better off dead, or of hurting yourself in some way 0   Total Score 5   If you checked off any problems, how difficult have these problems made it for you to do your work, take care of things at home, or get along with other people? -       Health Habits and Functional and Cognitive Screening:  Functional & Cognitive Status 3/15/2019   Do you have difficulty preparing food and eating? No   Do you have difficulty bathing  yourself, getting dressed or grooming yourself? No   Do you have difficulty using the toilet? No   Do you have difficulty moving around from place to place? No   Do you have trouble with steps or getting out of a bed or a chair? No   In the past year have you fallen or experienced a near fall? No   Current Diet Well Balanced Diet   Dental Exam Up to date   Eye Exam Up to date   Exercise (times per week) 0 times per week   Do you need help using the phone?  No   Are you deaf or do you have serious difficulty hearing?  No   Do you need help with transportation? No   Do you need help shopping? No   Do you need help preparing meals?  No   Do you need help with housework?  No   Do you need help with laundry? No   Do you need help taking your medications? No   Do you need help managing money? No   Do you ever drive or ride in a car without wearing a seat belt? No   Have you felt unusual stress, anger or loneliness in the last month? Yes   Who do you live with? Spouse   If you need help, do you have trouble finding someone available to you? No   Have you been bothered in the last four weeks by sexual problems? No   Do you have difficulty concentrating, remembering or making decisions? No           Does the patient have evidence of cognitive impairment? No    Aspirin use counseling: Does not need ASA (and currently is not on it)      Recent Lab Results:  CMP:  Lab Results   Component Value Date     (H) 03/08/2019    BUN 16 03/08/2019    CREATININE 0.64 03/08/2019    EGFRIFNONA 93 03/08/2019    EGFRIFAFRI 113 03/08/2019    BCR 25.0 03/08/2019     03/08/2019    K 4.5 03/08/2019    CO2 29.0 03/08/2019    CALCIUM 10.2 03/08/2019    PROTENTOTREF 7.4 03/08/2019    ALBUMIN 4.60 03/08/2019    LABGLOBREF 2.8 03/08/2019    LABIL2 1.6 03/08/2019    BILITOT 0.5 03/08/2019    ALKPHOS 49 03/08/2019    AST 43 (H) 03/08/2019    ALT 27 03/08/2019     Lipid Panel:  Lab Results   Component Value Date    TRIG 67 03/08/2019    HDL  101 (H) 03/08/2019    VLDL 13.4 03/08/2019     HbA1c:       Visual Acuity:  No exam data present    Age-appropriate Screening Schedule:  Refer to the list below for future screening recommendations based on patient's age, sex and/or medical conditions. Orders for these recommended tests are listed in the plan section. The patient has been provided with a written plan.    Health Maintenance   Topic Date Due   • ZOSTER VACCINE (2 of 3) 07/27/2013   • MAMMOGRAM  07/02/2020   • DXA SCAN  07/02/2020   • TDAP/TD VACCINES (2 - Td) 02/04/2026   • COLONOSCOPY  06/04/2026   • INFLUENZA VACCINE  Completed   • PNEUMOCOCCAL VACCINES (65+ LOW/MEDIUM RISK)  Completed        Subjective   History of Present Illness    Melany Lloyd is a 68 y.o. female who presents for an Subsequent Wellness Visit.    The following portions of the patient's history were reviewed and updated as appropriate: allergies, current medications, past family history, past medical history, past social history, past surgical history and problem list.    Outpatient Medications Prior to Visit   Medication Sig Dispense Refill   • Cholecalciferol (VITAMIN D) 2000 UNITS capsule Take 1,000 Units by mouth Daily.     • Flaxseed, Linseed, (FLAX SEED OIL PO) Take 2 capsules by mouth daily.     • Omega-3 Fatty Acids (FISH OIL PO) Take 1 tablet by mouth Daily.       No facility-administered medications prior to visit.        Patient Active Problem List   Diagnosis   • GERD (gastroesophageal reflux disease)   • SLE (systemic lupus erythematosus) (CMS/HCC)   • DDD (degenerative disc disease), lumbosacral   • Fibromyalgia   • Familial tremor   • Abnormal MRA, brain   • Cervical disc disorder at C5-C6 level with radiculopathy   • Aneurysm of other specified arteries (CMS/HCC)   • Solitary pulmonary nodule   • Vitamin D deficiency   • Mild episode of depression       Advance Care Planning:  has an advance directive - a copy HAS NOT been provided    Identification of Risk  Factors:  Risk factors include: unhealthy diet, cardiovascular risk, inactivity and chronic pain.    Review of Systems   Constitutional: Positive for fatigue. Negative for chills and fever.   HENT: Negative for congestion, postnasal drip and rhinorrhea.    Eyes: Negative for discharge and itching.   Respiratory: Negative for cough, choking and shortness of breath.    Cardiovascular: Negative for chest pain, palpitations and leg swelling.   Gastrointestinal: Negative for abdominal pain, constipation, diarrhea, nausea and vomiting.   Genitourinary: Negative for dysuria, menstrual problem, pelvic pain and urgency.   Musculoskeletal: Positive for arthralgias and myalgias.   Skin: Negative for color change and pallor.   Neurological: Negative for tremors and headaches.   Psychiatric/Behavioral: Negative for dysphoric mood, sleep disturbance and suicidal ideas. The patient is not nervous/anxious.        Compared to one year ago, the patient feels her physical health is worse. Due to stopping exercising due to it being very cold this winter; knows what she needs to do to fix this.  Compared to one year ago, the patient feels her mental health is worse. M ore stressed; sister who is single with no children had cancer and colon surgery. She is cancer free and is showing signs of mild dementia.     Objective     Physical Exam   Constitutional: She is oriented to person, place, and time. She appears well-developed and well-nourished.   HENT:   Head: Normocephalic and atraumatic.   Eyes: Conjunctivae and EOM are normal. Pupils are equal, round, and reactive to light.   Neck: Normal range of motion. Neck supple.   Cardiovascular: Normal rate, regular rhythm and normal heart sounds.   No murmur heard.  Pulmonary/Chest: Effort normal and breath sounds normal. No stridor. No respiratory distress. She has no wheezes. She has no rales.   Abdominal: Soft. Bowel sounds are normal. She exhibits no distension and no mass. There is no  "tenderness. There is no rebound and no guarding. No hernia.   Musculoskeletal: Normal range of motion.   Neurological: She is alert and oriented to person, place, and time.   Skin: Skin is warm.   Psychiatric: She has a normal mood and affect. Her behavior is normal.       Vitals:    03/15/19 0952   BP: 112/68   Pulse: 60   Resp: 16   Temp: 98.4 °F (36.9 °C)   SpO2: 97%   Weight: 75.8 kg (167 lb)   Height: 172.7 cm (67.99\")       Patient's Body mass index is 25.4 kg/m². BMI is above normal parameters. Recommendations include: exercise counseling and nutrition counseling.      Assessment/Plan   Patient Self-Management and Personalized Health Advice  The patient has been provided with information about: diet, exercise and weight management and preventive services including:   · Fall Risk assessment done, Nutrition counseling provided, Couseling provided re Shingrex.    Visit Diagnoses:    ICD-10-CM ICD-9-CM   1. Medicare annual wellness visit, subsequent Z00.00 V70.0   2. Vitamin D deficiency E55.9 268.9   3. Elevated lipids E78.5 272.4   4. Mild episode of depression F32.9 311       Orders Placed This Encounter   Procedures   • Vitamin D 25 Hydroxy     Standing Status:   Future     Standing Expiration Date:   3/15/2020   • Lipid Panel     Standing Status:   Future     Standing Expiration Date:   3/15/2020       Outpatient Encounter Medications as of 3/15/2019   Medication Sig Dispense Refill   • Cholecalciferol (VITAMIN D) 2000 UNITS capsule Take 1,000 Units by mouth Daily.     • Flaxseed, Linseed, (FLAX SEED OIL PO) Take 2 capsules by mouth daily.     • Omega-3 Fatty Acids (FISH OIL PO) Take 1 tablet by mouth Daily.       No facility-administered encounter medications on file as of 3/15/2019.        Reviewed use of high risk medication in the elderly: yes  Reviewed for potential of harmful drug interactions in the elderly: yes    Follow Up:  Return in about 6 months (around 9/15/2019) for Next scheduled follow up " lipids, vitamin D/osteopenia .     An After Visit Summary and PPPS with all of these plans were given to the patient.      - Not interested in meds/therapy for mild reactive depression currently  - Recheck liver enzymes in 1 month  - Recheck Vitamin D level in 3 months  - Recheck Lipid panel in 6 months  - At next visit discuss osteopenia/Vitamin D supplementation and if it is possible to start bisphosphonates

## 2019-03-15 NOTE — PATIENT INSTRUCTIONS
- Recheck liver enzymes in 1 month  - Recheck Vitamin D level in 3 months  - Recheck Lipid panel in 6 months  - At next visit discuss osteopenia/Vitamin D supplementation and if it is possible to start bisphosphonates

## 2019-04-14 ENCOUNTER — RESULTS ENCOUNTER (OUTPATIENT)
Dept: FAMILY MEDICINE CLINIC | Facility: CLINIC | Age: 69
End: 2019-04-14

## 2019-04-14 DIAGNOSIS — R74.8 ELEVATED LIVER ENZYMES: ICD-10-CM

## 2019-04-26 LAB
ALBUMIN SERPL-MCNC: 4.4 G/DL (ref 3.5–5.2)
ALP SERPL-CCNC: 56 U/L (ref 39–117)
ALT SERPL-CCNC: 25 U/L (ref 1–33)
AST SERPL-CCNC: 37 U/L (ref 1–32)
BILIRUB DIRECT SERPL-MCNC: <0.2 MG/DL (ref 0.2–0.3)
BILIRUB SERPL-MCNC: <0.2 MG/DL (ref 0.2–1.2)
PROT SERPL-MCNC: 7.2 G/DL (ref 6–8.5)

## 2019-05-13 ENCOUNTER — TELEPHONE (OUTPATIENT)
Dept: FAMILY MEDICINE CLINIC | Facility: CLINIC | Age: 69
End: 2019-05-13

## 2019-05-13 DIAGNOSIS — R74.8 ELEVATED LIVER ENZYMES: Primary | ICD-10-CM

## 2019-06-13 ENCOUNTER — RESULTS ENCOUNTER (OUTPATIENT)
Dept: FAMILY MEDICINE CLINIC | Facility: CLINIC | Age: 69
End: 2019-06-13

## 2019-06-13 DIAGNOSIS — R74.8 ELEVATED LIVER ENZYMES: ICD-10-CM

## 2019-06-13 LAB
25(OH)D3+25(OH)D2 SERPL-MCNC: 30.9 NG/ML (ref 30–100)
ALBUMIN SERPL-MCNC: 4.3 G/DL (ref 3.5–5.2)
ALP SERPL-CCNC: 57 U/L (ref 39–117)
ALT SERPL-CCNC: 22 U/L (ref 1–33)
AST SERPL-CCNC: 40 U/L (ref 1–32)
BILIRUB DIRECT SERPL-MCNC: <0.2 MG/DL (ref 0.2–0.3)
BILIRUB SERPL-MCNC: 0.3 MG/DL (ref 0.2–1.2)
PROT SERPL-MCNC: 7.2 G/DL (ref 6–8.5)

## 2019-06-15 ENCOUNTER — RESULTS ENCOUNTER (OUTPATIENT)
Dept: FAMILY MEDICINE CLINIC | Facility: CLINIC | Age: 69
End: 2019-06-15

## 2019-06-15 DIAGNOSIS — E55.9 VITAMIN D DEFICIENCY: ICD-10-CM

## 2019-06-19 ENCOUNTER — TRANSCRIBE ORDERS (OUTPATIENT)
Dept: ADMINISTRATIVE | Facility: HOSPITAL | Age: 69
End: 2019-06-19

## 2019-06-19 DIAGNOSIS — R74.01 ELEVATED ALT MEASUREMENT: Primary | ICD-10-CM

## 2019-06-19 DIAGNOSIS — Z12.31 VISIT FOR SCREENING MAMMOGRAM: Primary | ICD-10-CM

## 2019-06-19 NOTE — PROGRESS NOTES
Her hemoglobin is low, please inform patient vitamin D level is normal.    Her liver enzyme has gone back up to 40 now.  This is still considered a mild elevation which can be transient.  We can recheck this in 3 months.

## 2019-06-20 ENCOUNTER — TELEPHONE (OUTPATIENT)
Dept: FAMILY MEDICINE CLINIC | Facility: CLINIC | Age: 69
End: 2019-06-20

## 2019-06-30 DIAGNOSIS — R74.8 ELEVATED LIVER ENZYMES: Primary | ICD-10-CM

## 2019-06-30 NOTE — PROGRESS NOTES
Please inform patient liver enzyme is still elevated and has gone up from 37 to 43. I will order a liver ultrasound to further assess and we can recheck enzymes again in 1 month.

## 2019-07-01 ENCOUNTER — RESULTS ENCOUNTER (OUTPATIENT)
Dept: FAMILY MEDICINE CLINIC | Facility: CLINIC | Age: 69
End: 2019-07-01

## 2019-07-01 DIAGNOSIS — R74.8 ELEVATED LIVER ENZYMES: ICD-10-CM

## 2019-07-12 ENCOUNTER — HOSPITAL ENCOUNTER (OUTPATIENT)
Dept: MAMMOGRAPHY | Facility: HOSPITAL | Age: 69
Discharge: HOME | End: 2019-07-12
Admitting: FAMILY MEDICINE

## 2019-07-12 ENCOUNTER — HOSPITAL ENCOUNTER (OUTPATIENT)
Dept: ULTRASOUND IMAGING | Facility: HOSPITAL | Age: 69
Discharge: HOME | End: 2019-07-12

## 2019-07-12 DIAGNOSIS — R74.8 ELEVATED LIVER ENZYMES: ICD-10-CM

## 2019-07-12 DIAGNOSIS — Z12.31 VISIT FOR SCREENING MAMMOGRAM: ICD-10-CM

## 2019-07-12 PROCEDURE — 77067 SCR MAMMO BI INCL CAD: CPT

## 2019-07-12 PROCEDURE — 76705 ECHO EXAM OF ABDOMEN: CPT

## 2019-07-18 NOTE — PROGRESS NOTES
Please inform patient liver ultrasound was essentially normal. Continue with plan of care to repeat liver enzymes this month. If still elevated will consider additional imaging.

## 2019-08-16 LAB
ALBUMIN SERPL-MCNC: 4.6 G/DL (ref 3.5–5.2)
ALP SERPL-CCNC: 52 U/L (ref 39–117)
ALT SERPL-CCNC: 26 U/L (ref 1–33)
AST SERPL-CCNC: 44 U/L (ref 1–32)
BILIRUB DIRECT SERPL-MCNC: <0.2 MG/DL (ref 0.2–0.3)
BILIRUB SERPL-MCNC: 0.3 MG/DL (ref 0.2–1.2)
PROT SERPL-MCNC: 7.2 G/DL (ref 6–8.5)

## 2019-08-22 DIAGNOSIS — R74.01 ELEVATED AST (SGOT): Primary | ICD-10-CM

## 2019-09-11 ENCOUNTER — RESULTS ENCOUNTER (OUTPATIENT)
Dept: FAMILY MEDICINE CLINIC | Facility: CLINIC | Age: 69
End: 2019-09-11

## 2019-09-11 ENCOUNTER — TELEPHONE (OUTPATIENT)
Dept: FAMILY MEDICINE CLINIC | Facility: CLINIC | Age: 69
End: 2019-09-11

## 2019-09-11 DIAGNOSIS — E78.5 ELEVATED LIPIDS: ICD-10-CM

## 2019-09-11 NOTE — TELEPHONE ENCOUNTER
Please advise patient instead of repeating liver ultrasound will actually just get her into GI per my referral.  He can cancel any plans for further liver ultrasound she had one in June 2019 which was essentially normal.

## 2019-09-17 ENCOUNTER — OFFICE VISIT (OUTPATIENT)
Dept: GASTROENTEROLOGY | Facility: CLINIC | Age: 69
End: 2019-09-17

## 2019-09-17 VITALS
BODY MASS INDEX: 25.37 KG/M2 | TEMPERATURE: 98.3 F | WEIGHT: 167.4 LBS | SYSTOLIC BLOOD PRESSURE: 112 MMHG | DIASTOLIC BLOOD PRESSURE: 70 MMHG | HEIGHT: 68 IN

## 2019-09-17 DIAGNOSIS — R74.8 ELEVATED LIVER ENZYMES: Primary | ICD-10-CM

## 2019-09-17 DIAGNOSIS — M32.9 SYSTEMIC LUPUS ERYTHEMATOSUS, UNSPECIFIED SLE TYPE, UNSPECIFIED ORGAN INVOLVEMENT STATUS (HCC): ICD-10-CM

## 2019-09-17 DIAGNOSIS — Z11.59 ENCOUNTER FOR SCREENING FOR OTHER VIRAL DISEASES: ICD-10-CM

## 2019-09-17 PROCEDURE — 99204 OFFICE O/P NEW MOD 45 MIN: CPT | Performed by: INTERNAL MEDICINE

## 2019-09-17 RX ORDER — COVID-19 ANTIGEN TEST
220 KIT MISCELLANEOUS DAILY
COMMUNITY
End: 2022-02-15

## 2019-09-17 NOTE — PROGRESS NOTES
Chief Complaint   Patient presents with   • Elevated Hepatic Enzymes       Subjective     HPI    Melany Lloyd is a 69 y.o. female with a past medical history noted below who presents for evaluation of elevated liver enzymes. This was found on routine labs with her pcp a year ago.  Very mild elevation at that time, progressively worsening since then.  Predominantly an AST elevation ranging from 37 to about 40, mild, hepatocellular.  No associated jaundice, icterus, nor RUQ pain.  Hepatitis C antibody testing was negative    She was drinking 1-2 drinks daily for a few years but stopped about a year ago.  Now only drinking about 1 drink per month currently.  No IV drug use, no illicit drug use.    Maternal grandfather with hx of alcoholic cirrhosis    Sister with colon cancer; her last colonoscopy was 6/2016    Retired PT asst    No smoking    She has had a hysterectomy    She has a history of lupus, previously on plaquenil but has been off this for a few months.  She is followed by Dr Espinosa.          Past Medical History:   Diagnosis Date   • Allergic    • Breast cyst    • Chronic headache    • DDD (degenerative disc disease), lumbosacral    • Dyspnea on exertion 8/19/2016   • Fibromyalgia    • Fibromyalgia, primary    • Injury of back    • Left-sided Bell's palsy    • Medication management    • OP (osteoporosis)    • Osteoporosis 8/19/2016   • Shortness of breath    • SLE (systemic lupus erythematosus) (CMS/HCC)    • Urinary tract infection          Current Outpatient Medications:   •  Cholecalciferol (VITAMIN D) 2000 UNITS capsule, Take 1,000 Units by mouth Daily., Disp: , Rfl:   •  Flaxseed, Linseed, (FLAX SEED OIL PO), Take 2 capsules by mouth daily., Disp: , Rfl:   •  Naproxen Sodium 220 MG capsule, take 1 po daily as needed, Disp: , Rfl:   •  Omega-3 Fatty Acids (FISH OIL PO), Take 1 tablet by mouth Daily., Disp: , Rfl:   •  Probiotic Product (PROBIOTIC ADVANCED PO), take 1 po daily, Disp: , Rfl:     Allergies    Allergen Reactions   • Penicillins Itching and Rash   • Red Dye Itching and Rash       Social History     Socioeconomic History   • Marital status:      Spouse name: Not on file   • Number of children: Not on file   • Years of education: Not on file   • Highest education level: Not on file   Tobacco Use   • Smoking status: Never Smoker   • Smokeless tobacco: Never Used   Substance and Sexual Activity   • Alcohol use: Yes     Comment: Occasional.    • Drug use: No   • Sexual activity: Yes     Partners: Male       Family History   Problem Relation Age of Onset   • Stroke Mother    • Alzheimer's disease Mother    • Prostate cancer Father    • Heart disease Father    • Hypertension Father    • Mitral valve prolapse Father    • COPD Brother    • Diabetes Brother    • Arthritis Brother    • Mitral valve prolapse Brother    • Cancer Brother         bladder   • Heart disease Sister    • Hypertension Sister    • Colon cancer Sister    • Hypothyroidism Sister    • Rheum arthritis Maternal Grandfather    • Alcohol abuse Maternal Grandfather        Review of Systems   Constitutional: Negative for activity change, appetite change and fatigue.   HENT: Negative for sore throat and trouble swallowing.    Respiratory: Negative.    Cardiovascular: Negative.    Gastrointestinal: Negative for abdominal distention, abdominal pain and blood in stool.   Endocrine: Negative for cold intolerance and heat intolerance.   Genitourinary: Negative for difficulty urinating, dysuria and frequency.   Musculoskeletal: Negative for arthralgias, back pain and myalgias.   Skin: Negative.    Hematological: Negative for adenopathy. Does not bruise/bleed easily.   All other systems reviewed and are negative.      Objective     Vitals:    09/17/19 1342   BP: 112/70   Temp: 98.3 °F (36.8 °C)         09/17/19  1342   Weight: 75.9 kg (167 lb 6.4 oz)     Body mass index is 25.45 kg/m².    Physical Exam   Constitutional: She is oriented to person,  place, and time. She appears well-developed and well-nourished. No distress.   HENT:   Head: Normocephalic and atraumatic.   Right Ear: External ear normal.   Left Ear: External ear normal.   Nose: Nose normal.   Mouth/Throat: Oropharynx is clear and moist.   Eyes: Conjunctivae and EOM are normal. Right eye exhibits no discharge. Left eye exhibits no discharge. No scleral icterus.   Neck: Normal range of motion. Neck supple. No thyromegaly present.   No supraclavicular adenopathy   Cardiovascular: Normal rate, regular rhythm, normal heart sounds and intact distal pulses. Exam reveals no gallop.   No murmur heard.  No lower extremity edema   Pulmonary/Chest: Effort normal and breath sounds normal. No respiratory distress. She has no wheezes.   Abdominal: Soft. Normal appearance and bowel sounds are normal. She exhibits no distension and no mass. There is no hepatosplenomegaly. There is no tenderness. There is no rigidity, no rebound and no guarding. No hernia.   Genitourinary:   Genitourinary Comments: Rectal exam deferred   Musculoskeletal: Normal range of motion. She exhibits no edema or tenderness.   No atrophy of upper or lower extremities.  Normal digits and nails of both hands.   Lymphadenopathy:     She has no cervical adenopathy.   Neurological: She is alert and oriented to person, place, and time. She displays no atrophy. Coordination normal.   Skin: Skin is warm and dry. No rash noted. She is not diaphoretic. No erythema.   Psychiatric: She has a normal mood and affect. Her behavior is normal. Judgment and thought content normal.   Vitals reviewed.      WBC   Date Value Ref Range Status   01/05/2018 5.08 4.50 - 10.70 10*3/mm3 Final     RBC   Date Value Ref Range Status   01/05/2018 4.33 3.90 - 5.20 10*6/mm3 Final     Hemoglobin   Date Value Ref Range Status   01/05/2018 13.5 11.9 - 15.5 g/dL Final     Hematocrit   Date Value Ref Range Status   01/05/2018 40.0 35.6 - 45.5 % Final     MCV   Date Value Ref  Range Status   01/05/2018 92.4 80.5 - 98.2 fL Final     MCH   Date Value Ref Range Status   01/05/2018 31.2 26.9 - 32.0 pg Final     MCHC   Date Value Ref Range Status   01/05/2018 33.8 32.4 - 36.3 g/dL Final     RDW   Date Value Ref Range Status   01/05/2018 13.6 (H) 11.7 - 13.0 % Final     RDW-SD   Date Value Ref Range Status   01/05/2018 46.9 37.0 - 54.0 fl Final     MPV   Date Value Ref Range Status   01/05/2018 9.1 6.0 - 12.0 fL Final     Platelets   Date Value Ref Range Status   01/05/2018 292 140 - 500 10*3/mm3 Final       Glucose   Date Value Ref Range Status   01/05/2018 88 65 - 99 mg/dL Final     Sodium   Date Value Ref Range Status   03/08/2019 139 136 - 145 mmol/L Final   01/05/2018 141 136 - 145 mmol/L Final     Potassium   Date Value Ref Range Status   03/08/2019 4.5 3.5 - 5.2 mmol/L Final   01/05/2018 4.8 3.5 - 5.2 mmol/L Final     CO2   Date Value Ref Range Status   01/05/2018 29.3 (H) 22.0 - 29.0 mmol/L Final     Total CO2   Date Value Ref Range Status   03/08/2019 29.0 22.0 - 29.0 mmol/L Final     Chloride   Date Value Ref Range Status   03/08/2019 99 98 - 107 mmol/L Final   01/05/2018 100 98 - 107 mmol/L Final     Anion Gap   Date Value Ref Range Status   01/05/2018 11.7 mmol/L Final     Creatinine   Date Value Ref Range Status   03/08/2019 0.64 0.57 - 1.00 mg/dL Final   01/05/2018 0.55 (L) 0.57 - 1.00 mg/dL Final     BUN   Date Value Ref Range Status   03/08/2019 16 8 - 23 mg/dL Final   01/05/2018 18 8 - 23 mg/dL Final     BUN/Creatinine Ratio   Date Value Ref Range Status   03/08/2019 25.0 7.0 - 25.0 Final   01/05/2018 32.7 (H) 7.0 - 25.0 Final     Calcium   Date Value Ref Range Status   03/08/2019 10.2 8.6 - 10.5 mg/dL Final   01/05/2018 9.7 8.6 - 10.5 mg/dL Final     eGFR Non  Am   Date Value Ref Range Status   03/08/2019 93 >60 mL/min/1.73 Final     eGFR Non  Amer   Date Value Ref Range Status   01/05/2018 111 >60 mL/min/1.73 Final     Alkaline Phosphatase   Date Value Ref Range  Status   08/15/2019 52 39 - 117 U/L Final     ALT (SGPT)   Date Value Ref Range Status   08/15/2019 26 1 - 33 U/L Final     AST (SGOT)   Date Value Ref Range Status   08/15/2019 44 (H) 1 - 32 U/L Final     Total Bilirubin   Date Value Ref Range Status   08/15/2019 0.3 0.2 - 1.2 mg/dL Final     Albumin   Date Value Ref Range Status   08/15/2019 4.60 3.50 - 5.20 g/dL Final     A/G Ratio   Date Value Ref Range Status   03/08/2019 1.6 g/dL Final         IMPRESSION:     No discrete liver mass or biliary ductal dilatation is noted. If further  imaging evaluation is indicated, enhanced liver imaging could be  obtained.     This report was finalized on 7/15/2019 12:41 PM by Dr. Garret Murphy M.D.      No notes on file    Assessment/Plan    Elevated liver enzymes: Chronic issue, mild.  Possibly lupus enteritis versus another autoimmune disease      Lupus: Not currently on any medications, followed by Dr. Espinosa    Plan  We will check celiac panel, liver serologic work-up  We will check creatinine kinase level, gamma GGT  We will check hepatitis B panel  Further recommendations after these lab tests have returned    Melany was seen today for elevated hepatic enzymes.    Diagnoses and all orders for this visit:    Elevated liver enzymes  -     IgA  -     Tissue Transglutaminase, IgA  -     Tissue Transglutaminase, IgG  -     Hepatitis B Surface Antibody  -     Hepatitis B Surface Antigen  -     Alpha - 1 - Antitrypsin Phenotype  -     JASS  -     Anti-Smooth Muscle Antibody Titer  -     Ceruloplasmin  -     Gamma GT  -     Ferritin  -     Mitochondrial Antibodies, M2  -     Iron Profile  -     CK  -     Comprehensive Metabolic Panel    Encounter for screening for other viral diseases   -     Hepatitis B Surface Antibody  -     Hepatitis B Surface Antigen    Systemic lupus erythematosus, unspecified SLE type, unspecified organ involvement status (CMS/HCC)        I have discussed the above plan with the patient.  They  verbalize understanding and are in agreement with the plan.  They have been advised to contact the office for any questions, concerns, or changes related to their health.    Dictated utilizing Dragon dictation

## 2019-09-17 NOTE — PATIENT INSTRUCTIONS
Labs today    For any additional questions, concerns or changes to your condition after today's office visit please contact the office at [phone].

## 2019-09-18 LAB
ACTIN IGG SERPL-ACNC: 13 UNITS (ref 0–19)
ALBUMIN SERPL-MCNC: 4.6 G/DL (ref 3.5–5.2)
ALBUMIN/GLOB SERPL: 1.7 G/DL
ALP SERPL-CCNC: 67 U/L (ref 39–117)
ALT SERPL-CCNC: 54 U/L (ref 1–33)
ANA SER QL: NEGATIVE
AST SERPL-CCNC: 64 U/L (ref 1–32)
BILIRUB SERPL-MCNC: 0.3 MG/DL (ref 0.2–1.2)
BUN SERPL-MCNC: 14 MG/DL (ref 8–23)
BUN/CREAT SERPL: 24.6 (ref 7–25)
CALCIUM SERPL-MCNC: 9.9 MG/DL (ref 8.6–10.5)
CERULOPLASMIN SERPL-MCNC: 19.9 MG/DL (ref 19–39)
CHLORIDE SERPL-SCNC: 97 MMOL/L (ref 98–107)
CHOLEST SERPL-MCNC: 245 MG/DL (ref 0–200)
CK SERPL-CCNC: 143 U/L (ref 20–180)
CO2 SERPL-SCNC: 31.6 MMOL/L (ref 22–29)
CREAT SERPL-MCNC: 0.57 MG/DL (ref 0.57–1)
FERRITIN SERPL-MCNC: 81.8 NG/ML (ref 13–150)
GGT SERPL-CCNC: 28 U/L (ref 5–36)
GLOBULIN SER CALC-MCNC: 2.7 GM/DL
GLUCOSE SERPL-MCNC: 87 MG/DL (ref 65–99)
HBV SURFACE AB SER QL: REACTIVE
HBV SURFACE AG SERPL QL IA: NEGATIVE
HDLC SERPL-MCNC: 105 MG/DL (ref 40–60)
IGA SERPL-MCNC: 115 MG/DL (ref 87–352)
IRON SATN MFR SERPL: 21 % (ref 20–50)
IRON SERPL-MCNC: 88 MCG/DL (ref 37–145)
LDLC SERPL CALC-MCNC: 128 MG/DL (ref 0–100)
MITOCHONDRIA M2 IGG SER-ACNC: <20 UNITS (ref 0–20)
POTASSIUM SERPL-SCNC: 5.3 MMOL/L (ref 3.5–5.2)
PROT SERPL-MCNC: 7.3 G/DL (ref 6–8.5)
SODIUM SERPL-SCNC: 140 MMOL/L (ref 136–145)
TIBC SERPL-MCNC: 426 MCG/DL
TRIGL SERPL-MCNC: 60 MG/DL (ref 0–150)
TTG IGA SER-ACNC: <2 U/ML (ref 0–3)
TTG IGG SER-ACNC: <2 U/ML (ref 0–5)
UIBC SERPL-MCNC: 338 MCG/DL (ref 112–346)
VLDLC SERPL CALC-MCNC: 12 MG/DL

## 2019-09-19 LAB
A1AT PHENOTYP SERPL IFE: NORMAL
A1AT SERPL-MCNC: 113 MG/DL (ref 90–200)

## 2019-09-19 NOTE — PROGRESS NOTES
Her liver tests remain slightly elevated.    Testing for other causes of liver disease is normal.    Will continue to follow liver tests

## 2019-09-20 ENCOUNTER — OFFICE VISIT (OUTPATIENT)
Dept: FAMILY MEDICINE CLINIC | Facility: CLINIC | Age: 69
End: 2019-09-20

## 2019-09-20 ENCOUNTER — TELEPHONE (OUTPATIENT)
Dept: GASTROENTEROLOGY | Facility: CLINIC | Age: 69
End: 2019-09-20

## 2019-09-20 VITALS
WEIGHT: 167 LBS | HEIGHT: 68 IN | BODY MASS INDEX: 25.31 KG/M2 | SYSTOLIC BLOOD PRESSURE: 131 MMHG | RESPIRATION RATE: 16 BRPM | HEART RATE: 65 BPM | TEMPERATURE: 97.6 F | DIASTOLIC BLOOD PRESSURE: 76 MMHG | OXYGEN SATURATION: 98 %

## 2019-09-20 DIAGNOSIS — M32.9 SYSTEMIC LUPUS ERYTHEMATOSUS, UNSPECIFIED SLE TYPE, UNSPECIFIED ORGAN INVOLVEMENT STATUS (HCC): ICD-10-CM

## 2019-09-20 DIAGNOSIS — M15.9 PRIMARY OSTEOARTHRITIS INVOLVING MULTIPLE JOINTS: ICD-10-CM

## 2019-09-20 DIAGNOSIS — R25.2 MUSCLE CRAMPS: ICD-10-CM

## 2019-09-20 DIAGNOSIS — M79.605 BILATERAL LEG AND FOOT PAIN: ICD-10-CM

## 2019-09-20 DIAGNOSIS — M79.671 BILATERAL LEG AND FOOT PAIN: ICD-10-CM

## 2019-09-20 DIAGNOSIS — M79.604 BILATERAL LEG AND FOOT PAIN: ICD-10-CM

## 2019-09-20 DIAGNOSIS — M79.672 BILATERAL LEG AND FOOT PAIN: ICD-10-CM

## 2019-09-20 DIAGNOSIS — L98.9 SKIN LESION: Primary | ICD-10-CM

## 2019-09-20 PROCEDURE — 99213 OFFICE O/P EST LOW 20 MIN: CPT | Performed by: FAMILY MEDICINE

## 2019-09-20 PROCEDURE — 73630 X-RAY EXAM OF FOOT: CPT | Performed by: FAMILY MEDICINE

## 2019-09-20 RX ORDER — CYCLOBENZAPRINE HCL 10 MG
10 TABLET ORAL 2 TIMES DAILY PRN
Qty: 30 TABLET | Refills: 1 | Status: SHIPPED | OUTPATIENT
Start: 2019-09-20 | End: 2019-12-03

## 2019-09-20 NOTE — PROGRESS NOTES
Erwin Lloyd is a 69 y.o. female.     69-year female presents today for six-month follow-up.    Bilateral foot and leg pain: Pain is bilateral mainly in the evening.  No prior injury or trauma that she can think of.  Pain shoots down her legs. Follows with Dr. Espinosa of rheumatology for lupus.  Also has a history of generalized osteoarthritis.  Was told she had osteoarthritis and plantar fasciitis in her right foot.  Over the past week she is noticed more pain in her left foot.  Pain starts around her ankle and shoots to her feet.  This is usually worse after she states that for the whole day and stretches out.  No pain when she wakes up with her first steps in the morning.  No numbness or tingling.  No swelling.    Skin lesion: No small that is dry and scaly and sometimes itchy.  No pain no drainage.    Recently was referred to GI for progressively elevated liver enzymes.  Was seen by GI recently; further lab work obtained per records.               The following portions of the patient's history were reviewed and updated as appropriate: allergies, current medications, past family history, past medical history, past social history, past surgical history and problem list.    Review of Systems   Constitutional: Negative for chills and fever.   HENT: Negative for congestion.    Respiratory: Negative for apnea and shortness of breath.    Cardiovascular: Negative for chest pain, palpitations and leg swelling.   Musculoskeletal: Positive for arthralgias and myalgias.   Skin: Positive for skin lesions.   Psychiatric/Behavioral: Negative for sleep disturbance and depressed mood. The patient is not nervous/anxious.        Objective   Physical Exam   Constitutional: She appears well-developed and well-nourished.   HENT:   Head: Normocephalic and atraumatic.   Nose: No congestion.   Mouth/Throat: Uvula is midline, oropharynx is clear and moist and mucous membranes are normal.   Eyes: EOM are normal. Pupils are  equal, round, and reactive to light.   Cardiovascular: Normal rate and regular rhythm.   No murmur heard.  Pulmonary/Chest: Effort normal and breath sounds normal. No stridor. No respiratory distress. She has no wheezes. She has no rales.   Musculoskeletal:        Right ankle: Normal.        Left ankle: Normal.        Right foot: There is decreased range of motion and bony tenderness. There is no swelling.        Left foot: There is decreased range of motion and bony tenderness. There is no swelling.   Neurological: She is alert.   Skin: Skin is warm.   Psychiatric: She has a normal mood and affect. Her behavior is normal.         No results found for: COLORU, CLARITYU, SPECGRAV, PHUR, LEUKOCYTESUR, NITRITE, PROTEINPOCUA, GLUCOSEUR, KETONESU, UROBILINOGEN, BILIRUBINUR, RBCUR      Assessment/Plan     Melany was seen today for feet and legs and suspicious skin lesion.    Diagnoses and all orders for this visit:    Skin lesion  -     Ambulatory Referral to Dermatology    Systemic lupus erythematosus, unspecified SLE type, unspecified organ involvement status (CMS/[facility])  -     Ambulatory Referral to Physical Therapy Evaluate and treat  -     XR Foot 3+ View Bilateral (In Office)    Primary osteoarthritis involving multiple joints  -     Ambulatory Referral to Physical Therapy Evaluate and treat  -     XR Foot 3+ View Bilateral (In Office)    Bilateral leg and foot pain  -     Ambulatory Referral to Physical Therapy Evaluate and treat  -     XR Foot 3+ View Bilateral (In Office)  -     cyclobenzaprine (FLEXERIL) 10 MG tablet; Take 1 tablet by mouth 2 (Two) Times a Day As Needed for Muscle Spasms.    Muscle cramps  -     cyclobenzaprine (FLEXERIL) 10 MG tablet; Take 1 tablet by mouth 2 (Two) Times a Day As Needed for Muscle Spasms.    Follow-up x-rays.  Prescribed transdermal cream from [facility] to help with pain.  Needs a refill of her Flexeril which was prescribed several years ago.  She has a bottle with her  today.  Refer to PT if this is only related.  However discussed that may also be related to her lupus.  Will consider referral to podiatry in the future if needed.  Return to clinic in 3 months for follow-up or sooner if needed.

## 2019-09-20 NOTE — TELEPHONE ENCOUNTER
----- Message from Lorraine Mejia MD sent at 9/19/2019  6:19 PM EDT -----  Her liver tests remain slightly elevated.    Testing for other causes of liver disease is normal.    Will continue to follow liver tests

## 2019-09-23 NOTE — TELEPHONE ENCOUNTER
Called pt and advised per Dr Mejia that her liver tests remain slightly elevated.  Testing for other causes of liver disease is normal.  Will continue to follow liver tests. Pt verb understanding.

## 2019-09-26 NOTE — PROGRESS NOTES
Osteoarthritis noted in both feet.  No recent or old fractures.  Heel spurs also noted.  Continue with plan of care with physical therapy as discussed at last visit.  Can consider podiatry referral after physical therapy.

## 2019-10-18 ENCOUNTER — TREATMENT (OUTPATIENT)
Dept: PHYSICAL THERAPY | Facility: CLINIC | Age: 69
End: 2019-10-18

## 2019-10-18 DIAGNOSIS — M79.671 PAIN IN BOTH FEET: Primary | ICD-10-CM

## 2019-10-18 DIAGNOSIS — M19.071 OSTEOARTHRITIS OF BOTH FEET, UNSPECIFIED OSTEOARTHRITIS TYPE: ICD-10-CM

## 2019-10-18 DIAGNOSIS — M79.672 PAIN IN BOTH FEET: Primary | ICD-10-CM

## 2019-10-18 DIAGNOSIS — M19.072 OSTEOARTHRITIS OF BOTH FEET, UNSPECIFIED OSTEOARTHRITIS TYPE: ICD-10-CM

## 2019-10-18 PROCEDURE — 97110 THERAPEUTIC EXERCISES: CPT | Performed by: PHYSICAL THERAPIST

## 2019-10-18 PROCEDURE — 97162 PT EVAL MOD COMPLEX 30 MIN: CPT | Performed by: PHYSICAL THERAPIST

## 2019-10-18 NOTE — PROGRESS NOTES
Physical Therapy Initial Evaluation and Plan of Care    Patient: Melany Lloyd   : 1950  Diagnosis/ICD-10 Code:  Pain in both feet [M79.671, M79.672]  Referring practitioner: Alma Jack MD    Subjective Evaluation    History of Present Illness  Mechanism of injury: Pt started walking 2 miles 5x/wk in August.  Started to notice pain in the (B) feet in the evenings when stopped to rest.  The symptoms have gotten better because she has taken time off walking.        The pt reports pain in the (B) forefoot, toes and ankles.  Notice burning in MCP region of (B) feet.  X-Rays show OA in (B) feet.      Dr. Jack has referred to PT and prescribed a cream for the feet that seems to help.    PMHX:  (L) foot 1 to 5 Metatarsal fx, hairline in , (R) TKR, (L) knee OA      Occupation:  Retired - PTA  Activities:  Dog, Housework, Gardening, walk 3x wk, watch 3 grandsons 1 day a wk  PLOF: Independent  Medical Hx Reviewed.    Quality of life: good    Pain  Current pain ratin  At best pain ratin  At worst pain ratin  Location: (B) Feet  Quality: burning, dull ache, discomfort, sharp and knife-like  Relieving factors: medications and rest  Aggravating factors: ambulation and standing    Social Support  Lives in: multiple-level home  Lives with: spouse    Diagnostic Tests  X-ray: abnormal (OA)    Treatments  Current treatment: medication           Objective       Active Range of Motion   Left Ankle/Foot   Dorsiflexion (ke): 8 degrees   Plantar flexion: 28 degrees   Inversion: 40 degrees   Eversion: 12 degrees     Right Ankle/Foot   Dorsiflexion (ke): 5 degrees   Plantar flexion: 24 degrees   Inversion: 43 degrees   Eversion: 28 degrees     Strength/Myotome Testing     Left Ankle/Foot   Dorsiflexion: 5  Inversion: 4+  Eversion: 4+    Right Ankle/Foot   Dorsiflexion: 5  Inversion: 4+  Eversion: 5         Assessment & Plan     Assessment  Impairments: abnormal or restricted ROM, activity intolerance, impaired  balance, impaired physical strength, lacks appropriate home exercise program, pain with function and weight-bearing intolerance  Assessment details: Pt presents to PT with signs and symptoms consistent with (B) Ankle & Forefoot OA.  Pt would benefit from skilled PT intervention to address the deficits described.    Prognosis: good  Functional Limitations: walking, uncomfortable because of pain, standing, stooping and unable to perform repetitive tasks  Goals  Plan Goals:   Short Term Goals:  6 Visits   1.Pt to be instructed in initial HEP.  2. c/o pain to 3/10 for ease with ambulation on TM/level surface up to 15 min without increase in s/s. sustained over 2-3 days.  3. Minimal palpable tenderness in (B) feet.    4.  Increase ROM (DF to 10, PF to 35 ) to normalize gait, less early heel rise.  5. Pt able to balance on SLS 5 sec. on level surface to help promote safety on uneven terrain.    Long Term Goals:  12 visits  1. Pt to demonstrate independencewith advanced HEP  2. Decrease c/o pain to 1/10 for ease with functional activity mentioned above>30 min.  3. Full (L) Ankle AROM 15º DF, PF: 40 degrees, Inv: 40, Ev: 18  4. LEFS > 70/80  (% perceived normal ability)  5. No palapable tenderness to (B) feet.  6.  SLS balance on uneven surface for up to 15 sec. For increased safety and endurance for walking on outdoor uneven surfaces.   7.  Strength to 5-/5 all planes of ankle as needed for prolonged ambulation as needed for home/self-care.        Plan  Therapy options: will be seen for skilled physical therapy services  Planned modality interventions: ultrasound, TENS and cryotherapy  Planned therapy interventions: joint mobilization, home exercise program, gait training, flexibility, balance/weight-bearing training, manual therapy, neuromuscular re-education, soft tissue mobilization, strengthening, stretching, therapeutic activities, orthotic fitting/training, functional ROM exercises and body mechanics training  Other  planned therapy interventions: Dry Needling  Frequency: 2x week  Duration in visits: 12  Treatment plan discussed with: patient        Manual Therapy:          mins  58617;  Therapeutic Exercise:     10     mins  57386;     Neuromuscular Wm:         mins  82631;    Therapeutic Activity:           mins  81770;     Gait Training:            mins  71386;     Ultrasound:           mins  60105;    Electrical Stimulation:          mins  78205 ( );  Dry Needling           mins self-pay  Traction           mins 77065  Canalith Repositioning         mins [id number]      Timed Treatment:   10   mins   Total Treatment:     60   mins    PT SIGNATURE: Fede Winter PT   [facility] #[id number]    DATE TREATMENT INITIATED: 10/18/2019    Medicare Initial Certification    Certification Period: 1/16/2020  I certify that the therapy services are furnished while this patient is under my care.  The services outlined above are required by this patient, and will be reviewed every 90 days.     PHYSICIAN: Alma Jack MD      DATE:     Please sign and return via fax to [phone].. Thank you, [facility].

## 2019-10-21 ENCOUNTER — TREATMENT (OUTPATIENT)
Dept: PHYSICAL THERAPY | Facility: CLINIC | Age: 69
End: 2019-10-21

## 2019-10-21 DIAGNOSIS — M19.072 OSTEOARTHRITIS OF BOTH FEET, UNSPECIFIED OSTEOARTHRITIS TYPE: ICD-10-CM

## 2019-10-21 DIAGNOSIS — M79.672 PAIN IN BOTH FEET: Primary | ICD-10-CM

## 2019-10-21 DIAGNOSIS — M79.671 PAIN IN BOTH FEET: Primary | ICD-10-CM

## 2019-10-21 DIAGNOSIS — M19.071 OSTEOARTHRITIS OF BOTH FEET, UNSPECIFIED OSTEOARTHRITIS TYPE: ICD-10-CM

## 2019-10-21 PROCEDURE — 97110 THERAPEUTIC EXERCISES: CPT | Performed by: PHYSICAL THERAPIST

## 2019-10-21 NOTE — PROGRESS NOTES
Physical Therapy Daily Progress Note  Visit: 2    Melany Lloyd reports: The intensity of pain has improved and I am sleeping better but still having the burning pain randomly.    I notice (L) hip pain when I pivot on the (L) foot, but not every time.      I would like to see if I can do my PT at home and not come to PT in the clinic, because I don't have time.        Subjective     Objective       Strength/Myotome Testing     Left Hip   Planes of Motion   Flexion: 4  External rotation: 4  Internal rotation: 5    Right Hip   Planes of Motion   Flexion: 4+  External rotation: 4  Internal rotation: 4+     See Exercise, Manual, and Modality Logs for complete treatment.       Assessment & Plan     Assessment  Assessment details: Pt demos (I) w/ HEP.  The pt is weak in (B) hips which is likely contributing to her (B) foot stress.  Progressed exercises today.      Reviewed with pt about the need to see the pt in the clinic to help progress her appropriately and help restore her to PLOF.    Plan  Plan details: Progress ROM / strengthening / stabilization / functional activity as tolerated          Manual Therapy:          mins  28818;  Therapeutic Exercise:     40     mins  91665;     Neuromuscular Wm:         mins  02560;    Therapeutic Activity:           mins  21624;     Gait Training:            mins  53692;     Ultrasound:           mins  11754;    Electrical Stimulation:          mins  45115 ( );  Dry Needling           mins self-pay  Traction           mins 99320  Canalith Repositioning         mins 20084      Timed Treatment:   40   mins   Total Treatment:     40   mins    Fede Winter PT  [place] License #: [id number]    Physical Therapist

## 2019-10-23 ENCOUNTER — TREATMENT (OUTPATIENT)
Dept: PHYSICAL THERAPY | Facility: CLINIC | Age: 69
End: 2019-10-23

## 2019-10-23 DIAGNOSIS — M19.072 OSTEOARTHRITIS OF BOTH FEET, UNSPECIFIED OSTEOARTHRITIS TYPE: ICD-10-CM

## 2019-10-23 DIAGNOSIS — M19.071 OSTEOARTHRITIS OF BOTH FEET, UNSPECIFIED OSTEOARTHRITIS TYPE: ICD-10-CM

## 2019-10-23 DIAGNOSIS — M79.672 PAIN IN BOTH FEET: Primary | ICD-10-CM

## 2019-10-23 DIAGNOSIS — M79.671 PAIN IN BOTH FEET: Primary | ICD-10-CM

## 2019-10-23 PROCEDURE — 97110 THERAPEUTIC EXERCISES: CPT | Performed by: PHYSICAL THERAPIST

## 2019-10-23 NOTE — PROGRESS NOTES
Physical Therapy Daily Progress Note  Visit: 3    Melany Lloyd reports: Last night I did have 5/10 pain in my feet but for only for 5 mins.  That is improvement, because it used to hurt the whole time I sat down.  I am really surprised by how fast my symptoms are improving in a short time.     Subjective     Objective   See Exercise, Manual, and Modality Logs for complete treatment.       Assessment & Plan     Assessment  Assessment details: The pt is yasmeen Rx well with decreasing symptoms of pain in her feet.  She continues to be fatigued with the exercises performed in the clinic, related to her (B) hip weakness.  Talked about the pt returning to walking outside for time limits vs distances and ok'd walking for 10-15 mins at home, without pain.     Plan  Plan details: Progress ROM / strengthening / stabilization / functional activity as tolerated          Manual Therapy:          mins  12357;  Therapeutic Exercise:     45     mins  02415;     Neuromuscular Wm:     5    mins  07654;    Therapeutic Activity:           mins  43493;     Gait Training:            mins  18428;     Ultrasound:           mins  00348;    Electrical Stimulation:          mins  60035 ( );  Dry Needling           mins self-pay  Traction           mins 94972  Canalith Repositioning         mins 27987      Timed Treatment:   50   mins   Total Treatment:     50   mins    Fede Winter PT  [place] License #: [id number]    Physical Therapist

## 2019-10-28 ENCOUNTER — TREATMENT (OUTPATIENT)
Dept: PHYSICAL THERAPY | Facility: CLINIC | Age: 69
End: 2019-10-28

## 2019-10-28 DIAGNOSIS — M79.672 PAIN IN BOTH FEET: Primary | ICD-10-CM

## 2019-10-28 DIAGNOSIS — M79.671 PAIN IN BOTH FEET: Primary | ICD-10-CM

## 2019-10-28 DIAGNOSIS — M19.072 OSTEOARTHRITIS OF BOTH FEET, UNSPECIFIED OSTEOARTHRITIS TYPE: ICD-10-CM

## 2019-10-28 DIAGNOSIS — M19.071 OSTEOARTHRITIS OF BOTH FEET, UNSPECIFIED OSTEOARTHRITIS TYPE: ICD-10-CM

## 2019-10-28 PROCEDURE — 97110 THERAPEUTIC EXERCISES: CPT | Performed by: PHYSICAL THERAPIST

## 2019-10-30 ENCOUNTER — TREATMENT (OUTPATIENT)
Dept: PHYSICAL THERAPY | Facility: CLINIC | Age: 69
End: 2019-10-30

## 2019-10-30 DIAGNOSIS — M19.072 OSTEOARTHRITIS OF BOTH FEET, UNSPECIFIED OSTEOARTHRITIS TYPE: ICD-10-CM

## 2019-10-30 DIAGNOSIS — M79.672 PAIN IN BOTH FEET: Primary | ICD-10-CM

## 2019-10-30 DIAGNOSIS — M79.671 PAIN IN BOTH FEET: Primary | ICD-10-CM

## 2019-10-30 DIAGNOSIS — M19.071 OSTEOARTHRITIS OF BOTH FEET, UNSPECIFIED OSTEOARTHRITIS TYPE: ICD-10-CM

## 2019-10-30 PROCEDURE — 97110 THERAPEUTIC EXERCISES: CPT | Performed by: PHYSICAL THERAPIST

## 2019-11-06 ENCOUNTER — TREATMENT (OUTPATIENT)
Dept: PHYSICAL THERAPY | Facility: CLINIC | Age: 69
End: 2019-11-06

## 2019-11-06 DIAGNOSIS — M79.672 PAIN IN BOTH FEET: Primary | ICD-10-CM

## 2019-11-06 DIAGNOSIS — M19.072 OSTEOARTHRITIS OF BOTH FEET, UNSPECIFIED OSTEOARTHRITIS TYPE: ICD-10-CM

## 2019-11-06 DIAGNOSIS — M79.671 PAIN IN BOTH FEET: Primary | ICD-10-CM

## 2019-11-06 DIAGNOSIS — M19.071 OSTEOARTHRITIS OF BOTH FEET, UNSPECIFIED OSTEOARTHRITIS TYPE: ICD-10-CM

## 2019-11-06 PROCEDURE — 97110 THERAPEUTIC EXERCISES: CPT | Performed by: PHYSICAL THERAPIST

## 2019-11-06 NOTE — PROGRESS NOTES
Physical Therapy Daily Progress Note / Discharge Note    Referring practitioner: Alma Jack MD  Date of Initial Visit: Episode Type: THERAPY  Noted: 10/18/2019  Patient seen for 6 sessions.  ______________________________________________________________    Melany Lloyd reports: I am doing much better since starting PT.  I would like to do it on  my own now.      Subjective Evaluation    Pain  At worst pain ratin  Location: (L) foot           Objective       Active Range of Motion   Left Ankle/Foot   Dorsiflexion (ke): 9 degrees   Plantar flexion: 39 degrees   Inversion: 35 degrees   Eversion: 20 degrees     Right Ankle/Foot   Dorsiflexion (ke): 11 degrees   Plantar flexion: 38 degrees   Inversion: 32 degrees   Eversion: 33 degrees     Strength/Myotome Testing     Left Hip   Planes of Motion   Left hip flexors strength: 5-  External rotation: 5  Internal rotation: 5    Right Hip   Planes of Motion   Flexion: 5  External rotation: 5  Internal rotation: 5    Left Ankle/Foot   Dorsiflexion: 5  Inversion: 5  Eversion: 5    Right Ankle/Foot   Dorsiflexion: 5  Inversion: 5  Eversion: 5     See Exercise, Manual, and Modality Logs for complete treatment.     Goals   Short Term Goals:  ALL MET  1.Pt to be instructed in initial HEP.  2. c/o pain to 3/10 for ease with ambulation on TM/level surface up to 15 min without increase in s/s. sustained over 2-3 days.  3. Minimal palpable tenderness in (B) feet.    4.  Increase ROM (DF to 10, PF to 35 ) to normalize gait, less early heel rise.  5. Pt able to balance on SLS 5 sec. on level surface to help promote safety on uneven terrain.    Long Term Goals:  ALL MET except #2 & 4.    1. Pt to demonstrate independencewith advanced HEP  2. Decrease c/o pain to 1/10 for ease with functional activity mentioned above>30 min.  3. Full (L) Ankle AROM 15º DF, PF: 40 degrees, Inv: 40, Ev: 18  4. LEFS > 70/80  (% perceived normal ability)  5. No palapable tenderness to (B) feet.  6.   SLS balance on uneven surface for up to 15 sec. For increased safety and endurance for walking on outdoor uneven surfaces.   7.  Strength to 5-/5 all planes of ankle as needed for prolonged ambulation as needed for home/self-care.      Assessment & Plan     Assessment  Assessment details: The pt has progressed well with her (B) foot pain.  I would of continued PT for several more visits to continue improve strength and endurance on her (B) feet without pain.  She has some continued deficits but should be fine to continue on her own.  She has been instructed to return if she needs further care.      Plan  Plan details: DC to HEP.  Pt instructed to call with questions or issues related to their injury.              Manual Therapy:          mins  38749;  Therapeutic Exercise:     45     mins  86932 (8 mins reassessment);     Neuromuscular Wm:     5    mins  37062;    Therapeutic Activity:           mins  88074;     Gait Training:            mins  69849;     Ultrasound:           mins  61665;    Electrical Stimulation:          mins  88879 ( );  Dry Needling           mins self-pay  Traction           mins 59510  Canalith Repositioning         mins [id number]      Timed Treatment:   50   mins   Total Treatment:     50   mins    Fede Winter PT  [place] License #: [id number]    Physical Therapist

## 2019-12-02 NOTE — PROGRESS NOTES
Chief Complaint   Patient presents with   • Elevated Hepatic Enzymes     Subjective     HPI  Melany Lloyd is a 69 y.o. female who presents for follow-up of elevated transaminases.  She also has a history of lupus, not on any medications.  Serologic work-up for autoimmune and genetic liver diseases was negative.  Liver US normal.    She is doing well.  Recent issues with foot arthritis, gastroenteritis.      She has taken some tylenol recently, she has cut back on naproxen.      She has decreased ETOH intake but this was not very much to begin with.    She lost a little weight following the gastroenteritis.      Past Medical History:   Diagnosis Date   • Allergic    • Anxiety    • Breast cyst    • Cataract    • Chronic headache    • DDD (degenerative disc disease), lumbosacral    • Dyspnea on exertion 8/19/2016   • Fibromyalgia    • Fibromyalgia, primary    • Injury of back    • Left-sided Bell's palsy    • Medication management    • OP (osteoporosis)    • Osteoporosis 8/19/2016   • Shortness of breath    • SLE (systemic lupus erythematosus) (CMS/[facility])    • Urinary tract infection        Social History     Socioeconomic History   • Marital status:      Spouse name: Not on file   • Number of children: Not on file   • Years of education: Not on file   • Highest education level: Not on file   Tobacco Use   • Smoking status: Never Smoker   • Smokeless tobacco: Never Used   Substance and Sexual Activity   • Alcohol use: Yes     Comment: Occasional.    • Drug use: No   • Sexual activity: Yes     Partners: Male         Current Outpatient Medications:   •  Cholecalciferol (VITAMIN D) 2000 UNITS capsule, Take 1,000 Units by mouth Daily., Disp: , Rfl:   •  Flaxseed, Linseed, (FLAX SEED OIL PO), Take 2 capsules by mouth daily., Disp: , Rfl:   •  FOLIC ACID PO, Take  by mouth., Disp: , Rfl:   •  Naproxen Sodium 220 MG capsule, take 1 po daily as needed, Disp: , Rfl:   •  Omega-3 Fatty Acids (FISH OIL PO), Take 1 tablet  by mouth Daily., Disp: , Rfl:   •  Probiotic Product (PROBIOTIC ADVANCED PO), take 1 po daily, Disp: , Rfl:   •  vitamin B-12 (CYANOCOBALAMIN) 100 MCG tablet, Take 50 mcg by mouth Daily., Disp: , Rfl:   •  vitamin B-6 (PYRIDOXINE) 50 MG tablet, Take 50 mg by mouth Daily., Disp: , Rfl:     Review of Systems   Constitutional: Negative for activity change, appetite change, chills and fever.   HENT: Negative for trouble swallowing.    Respiratory: Negative.    Cardiovascular: Negative.  Negative for chest pain.   Gastrointestinal: Negative for abdominal distention, abdominal pain, anal bleeding, constipation, diarrhea, nausea and vomiting.   Genitourinary: Negative for dysuria, frequency and hematuria.       Objective   Vitals:    12/03/19 1401   BP: 124/82   Temp: 98.4 °F (36.9 °C)         12/03/19  1401   Weight: 72.6 kg (160 lb)     Body mass index is 24.33 kg/m².      Physical Exam   Constitutional: She is oriented to person, place, and time. She appears well-developed and well-nourished. No distress.   HENT:   Head: Normocephalic and atraumatic.   Right Ear: External ear normal.   Left Ear: External ear normal.   Nose: Nose normal.   Mouth/Throat: Oropharynx is clear and moist.   Eyes: Conjunctivae and EOM are normal. Right eye exhibits no discharge. Left eye exhibits no discharge. No scleral icterus.   Neck: Normal range of motion. Neck supple. No thyromegaly present.   No supraclavicular adenopathy   Cardiovascular: Normal rate, regular rhythm, normal heart sounds and intact distal pulses. Exam reveals no gallop.   No murmur heard.  No lower extremity edema   Pulmonary/Chest: Effort normal and breath sounds normal. No respiratory distress. She has no wheezes.   Abdominal: Soft. Normal appearance and bowel sounds are normal. She exhibits no distension and no mass. There is no hepatosplenomegaly. There is no tenderness. There is no rigidity, no rebound and no guarding. No hernia.   Genitourinary:   Genitourinary  Comments: Rectal exam deferred   Musculoskeletal: Normal range of motion. She exhibits no edema or tenderness.   No atrophy of upper or lower extremities.  Normal digits and nails of both hands.   Lymphadenopathy:     She has no cervical adenopathy.   Neurological: She is alert and oriented to person, place, and time. She displays no atrophy. Coordination normal.   Skin: Skin is warm and dry. No rash noted. She is not diaphoretic. No erythema.   Psychiatric: She has a normal mood and affect. Her behavior is normal. Judgment and thought content normal.   Vitals reviewed.      WBC   Date Value Ref Range Status   01/05/2018 5.08 4.50 - 10.70 10*3/mm3 Final     RBC   Date Value Ref Range Status   01/05/2018 4.33 3.90 - 5.20 10*6/mm3 Final     Hemoglobin   Date Value Ref Range Status   01/05/2018 13.5 11.9 - 15.5 g/dL Final     Hematocrit   Date Value Ref Range Status   01/05/2018 40.0 35.6 - 45.5 % Final     MCV   Date Value Ref Range Status   01/05/2018 92.4 80.5 - 98.2 fL Final     MCH   Date Value Ref Range Status   01/05/2018 31.2 26.9 - 32.0 pg Final     MCHC   Date Value Ref Range Status   01/05/2018 33.8 32.4 - 36.3 g/dL Final     RDW   Date Value Ref Range Status   01/05/2018 13.6 (H) 11.7 - 13.0 % Final     RDW-SD   Date Value Ref Range Status   01/05/2018 46.9 37.0 - 54.0 fl Final     MPV   Date Value Ref Range Status   01/05/2018 9.1 6.0 - 12.0 fL Final     Platelets   Date Value Ref Range Status   01/05/2018 292 140 - 500 10*3/mm3 Final       Lab Results   Component Value Date    GLUCOSE 88 01/05/2018    BUN 14 09/17/2019    CREATININE 0.57 09/17/2019    EGFRIFNONA 106 09/17/2019    EGFRIFAFRI 128 09/17/2019    BCR 24.6 09/17/2019    CO2 31.6 (H) 09/17/2019    CALCIUM 9.9 09/17/2019    PROTENTOTREF 7.3 09/17/2019    ALBUMIN 4.60 09/17/2019    LABIL2 1.7 09/17/2019    AST 64 (H) 09/17/2019    ALT 54 (H) 09/17/2019         Imaging Results (Last 7 Days)     ** No results found for the last 168 hours. **             Assessment/Plan    Elevated liver enzymes: Unclear etiology given normal appearing liver on ultrasound, normal serologic work-up.  She is really not on any medication other than the naproxen that could be a culprit    Lupus: Not on any medication, I do wonder if she does have a component of lupus hepatitis    Plan  Repeat CMP today along with GGT, CK  Perhaps we need to have her completely stop all of the naproxen to see if that is the culprit  I recommended that she ask her rheumatologist, Dr. Espinosa, if she thinks there is a chance that she could have lupus hepatitis  Further recommendations after labs  There is the option of liver biopsy but given the mild elevation, I feel that this is extreme at this time    Melany was seen today for elevated hepatic enzymes.    Diagnoses and all orders for this visit:    Systemic lupus erythematosus, unspecified SLE type, unspecified organ involvement status (CMS/HCC)  -     Comprehensive Metabolic Panel  -     Gamma GT    Elevated liver enzymes  -     Comprehensive Metabolic Panel  -     Comprehensive Metabolic Panel  -     Gamma GT  -     CK        Dictated utilizing Dragon dictation

## 2019-12-03 ENCOUNTER — OFFICE VISIT (OUTPATIENT)
Dept: GASTROENTEROLOGY | Facility: CLINIC | Age: 69
End: 2019-12-03

## 2019-12-03 VITALS
WEIGHT: 160 LBS | BODY MASS INDEX: 24.25 KG/M2 | HEIGHT: 68 IN | TEMPERATURE: 98.4 F | DIASTOLIC BLOOD PRESSURE: 82 MMHG | SYSTOLIC BLOOD PRESSURE: 124 MMHG

## 2019-12-03 DIAGNOSIS — M32.9 SYSTEMIC LUPUS ERYTHEMATOSUS, UNSPECIFIED SLE TYPE, UNSPECIFIED ORGAN INVOLVEMENT STATUS (HCC): Primary | ICD-10-CM

## 2019-12-03 DIAGNOSIS — R74.8 ELEVATED LIVER ENZYMES: ICD-10-CM

## 2019-12-03 PROCEDURE — 99213 OFFICE O/P EST LOW 20 MIN: CPT | Performed by: INTERNAL MEDICINE

## 2019-12-03 RX ORDER — LANOLIN ALCOHOL/MO/W.PET/CERES
50 CREAM (GRAM) TOPICAL DAILY
COMMUNITY

## 2019-12-03 RX ORDER — UBIDECARENONE 75 MG
50 CAPSULE ORAL DAILY
COMMUNITY
End: 2020-01-07

## 2019-12-04 ENCOUNTER — TELEPHONE (OUTPATIENT)
Dept: GASTROENTEROLOGY | Facility: CLINIC | Age: 69
End: 2019-12-04

## 2019-12-04 LAB
ALBUMIN SERPL-MCNC: 4.9 G/DL (ref 3.5–5.2)
ALBUMIN/GLOB SERPL: 1.8 G/DL
ALP SERPL-CCNC: 61 U/L (ref 39–117)
ALT SERPL-CCNC: 33 U/L (ref 1–33)
AST SERPL-CCNC: 52 U/L (ref 1–32)
BILIRUB SERPL-MCNC: 0.2 MG/DL (ref 0.2–1.2)
BUN SERPL-MCNC: 17 MG/DL (ref 8–23)
BUN/CREAT SERPL: 23.6 (ref 7–25)
CALCIUM SERPL-MCNC: 9.7 MG/DL (ref 8.6–10.5)
CHLORIDE SERPL-SCNC: 98 MMOL/L (ref 98–107)
CK SERPL-CCNC: 96 U/L (ref 20–180)
CO2 SERPL-SCNC: 29.7 MMOL/L (ref 22–29)
CREAT SERPL-MCNC: 0.72 MG/DL (ref 0.57–1)
GGT SERPL-CCNC: 26 U/L (ref 5–36)
GLOBULIN SER CALC-MCNC: 2.7 GM/DL
GLUCOSE SERPL-MCNC: 88 MG/DL (ref 65–99)
POTASSIUM SERPL-SCNC: 4.3 MMOL/L (ref 3.5–5.2)
PROT SERPL-MCNC: 7.6 G/DL (ref 6–8.5)
SODIUM SERPL-SCNC: 138 MMOL/L (ref 136–145)

## 2019-12-04 NOTE — TELEPHONE ENCOUNTER
----- Message from Lorraine Mejia MD sent at 12/4/2019 10:32 AM EST -----  Her liver enzymes remain mildly elevated but better than before.    Her GGT is normal which goes against any significant liver disease

## 2019-12-04 NOTE — PROGRESS NOTES
Her liver enzymes remain mildly elevated but better than before.    Her GGT is normal which goes against any significant liver disease

## 2019-12-04 NOTE — TELEPHONE ENCOUNTER
Call to pt.  Advise per Dr Mejia that liver enzymes remain mildly elevated, but better than before.      GGT is normal, which goes against any significant liver disease.  Verb understanding.

## 2020-01-07 ENCOUNTER — OFFICE VISIT (OUTPATIENT)
Dept: FAMILY MEDICINE CLINIC | Facility: CLINIC | Age: 70
End: 2020-01-07

## 2020-01-07 VITALS
SYSTOLIC BLOOD PRESSURE: 123 MMHG | RESPIRATION RATE: 16 BRPM | WEIGHT: 161 LBS | HEIGHT: 68 IN | OXYGEN SATURATION: 94 % | TEMPERATURE: 98 F | HEART RATE: 72 BPM | DIASTOLIC BLOOD PRESSURE: 79 MMHG | BODY MASS INDEX: 24.4 KG/M2

## 2020-01-07 DIAGNOSIS — M19.072 OSTEOARTHRITIS OF BOTH FEET, UNSPECIFIED OSTEOARTHRITIS TYPE: Primary | ICD-10-CM

## 2020-01-07 DIAGNOSIS — L30.9 DERMATITIS: ICD-10-CM

## 2020-01-07 DIAGNOSIS — M19.071 OSTEOARTHRITIS OF BOTH FEET, UNSPECIFIED OSTEOARTHRITIS TYPE: Primary | ICD-10-CM

## 2020-01-07 PROCEDURE — 99213 OFFICE O/P EST LOW 20 MIN: CPT | Performed by: FAMILY MEDICINE

## 2020-01-07 RX ORDER — CLOBETASOL PROPIONATE 0.5 MG/G
CREAM TOPICAL 2 TIMES DAILY
Qty: 30 G | Refills: 0 | Status: SHIPPED | OUTPATIENT
Start: 2020-01-07 | End: 2020-12-17

## 2020-01-07 RX ORDER — LANOLIN ALCOHOL/MO/W.PET/CERES
1 CREAM (GRAM) TOPICAL DAILY
COMMUNITY

## 2020-01-07 NOTE — PROGRESS NOTES
Erwin Lloyd is a 69 y.o. female.     69-year-old female presents today for follow-up on foot pain bilaterally.    At last visit she was referred to PT, x-ray of foot was done and she was also prescribed Flexeril.  Patient is also prescribe transdermal cream from [facility] to help with pain.  X-ray showed osteoarthritis in both feet with no recent or old fractures.  Heel spurs were also noted.  I advised her that we could consider podiatry referral after physical therapy.    Today patient reports significant improvement in foot pain bilaterally with cream and with physical therapy.  Has followed up with rheumatology since her last visit.  No new changes.  Keeping her off Plaquenil for now.    Itching all over her body; no rash.  Over the past few weeks.  Saw dermatology since her last visit with me for a skin lesion that was dry and scaly and sometimes itchy.  They gave her a mild steroid cream which helped.  Itching is recurring needs refill.           The following portions of the patient's history were reviewed and updated as appropriate: allergies, current medications, past family history, past medical history, past social history, past surgical history and problem list.    Review of Systems   Constitutional: Negative for chills and fever.   HENT: Negative for congestion.    Respiratory: Negative for cough and shortness of breath.    Cardiovascular: Negative for chest pain, palpitations and leg swelling.   Gastrointestinal: Negative for abdominal pain, diarrhea and vomiting.       Objective   Physical Exam   Constitutional: She appears well-developed and well-nourished.   HENT:   Head: Normocephalic and atraumatic.   Mouth/Throat: Uvula is midline, oropharynx is clear and moist and mucous membranes are normal.   Eyes: Pupils are equal, round, and reactive to light. EOM are normal.   Cardiovascular: Normal rate and regular rhythm.   No murmur heard.  Pulmonary/Chest: Effort normal and breath  sounds normal. No stridor. No respiratory distress. She has no wheezes. She has no rales.   Neurological: She is alert.   Skin: Skin is warm.   Psychiatric: She has a normal mood and affect. Her behavior is normal.               Assessment/Plan     Melany was seen today for foot pain and itching.    Diagnoses and all orders for this visit:    Osteoarthritis of both feet, unspecified osteoarthritis type    Dermatitis  -     clobetasol (TEMOVATE) 0.05 % cream; Apply  topically to the appropriate area as directed 2 (Two) Times a Day.    Continue with exercises that she was given by PT.  Return to clinic in 3 months for Medicare wellness visit or sooner if needed.  Get labs 1 week prior to visit: CBC CMP lipid panel vitamin D vitamin B12 and folate.

## 2020-06-09 ENCOUNTER — TRANSCRIBE ORDERS (OUTPATIENT)
Dept: ADMINISTRATIVE | Facility: HOSPITAL | Age: 70
End: 2020-06-09

## 2020-06-09 ENCOUNTER — OFFICE VISIT (OUTPATIENT)
Dept: FAMILY MEDICINE CLINIC | Facility: CLINIC | Age: 70
End: 2020-06-09

## 2020-06-09 VITALS
SYSTOLIC BLOOD PRESSURE: 135 MMHG | HEIGHT: 68 IN | DIASTOLIC BLOOD PRESSURE: 84 MMHG | BODY MASS INDEX: 24.25 KG/M2 | RESPIRATION RATE: 16 BRPM | OXYGEN SATURATION: 96 % | WEIGHT: 160 LBS | HEART RATE: 61 BPM | TEMPERATURE: 97 F

## 2020-06-09 DIAGNOSIS — Z00.00 MEDICARE ANNUAL WELLNESS VISIT, SUBSEQUENT: Primary | ICD-10-CM

## 2020-06-09 DIAGNOSIS — M85.80 OSTEOPENIA, UNSPECIFIED LOCATION: ICD-10-CM

## 2020-06-09 DIAGNOSIS — R91.1 SOLITARY PULMONARY NODULE ON LUNG CT: ICD-10-CM

## 2020-06-09 DIAGNOSIS — Z12.39 SCREENING BREAST EXAMINATION: Primary | ICD-10-CM

## 2020-06-09 DIAGNOSIS — K21.9 GASTROESOPHAGEAL REFLUX DISEASE, ESOPHAGITIS PRESENCE NOT SPECIFIED: ICD-10-CM

## 2020-06-09 DIAGNOSIS — R74.8 ELEVATED LIVER ENZYMES: ICD-10-CM

## 2020-06-09 DIAGNOSIS — Z90.722 HISTORY OF TOTAL HYSTERECTOMY WITH BILATERAL SALPINGO-OOPHORECTOMY (BSO): ICD-10-CM

## 2020-06-09 DIAGNOSIS — R93.7 ABNORMAL FINDINGS ON DIAGNOSTIC IMAGING OF OTHER PARTS OF MUSCULOSKELETAL SYSTEM: ICD-10-CM

## 2020-06-09 DIAGNOSIS — I72.8 ANEURYSM OF OTHER SPECIFIED ARTERIES (HCC): ICD-10-CM

## 2020-06-09 DIAGNOSIS — R22.2 SUPRACLAVICULAR FOSSA FULLNESS: ICD-10-CM

## 2020-06-09 DIAGNOSIS — Z90.79 HISTORY OF TOTAL HYSTERECTOMY WITH BILATERAL SALPINGO-OOPHORECTOMY (BSO): ICD-10-CM

## 2020-06-09 DIAGNOSIS — M79.7 FIBROMYALGIA: ICD-10-CM

## 2020-06-09 DIAGNOSIS — Z90.710 HISTORY OF TOTAL HYSTERECTOMY WITH BILATERAL SALPINGO-OOPHORECTOMY (BSO): ICD-10-CM

## 2020-06-09 DIAGNOSIS — R23.2 HOT FLASHES: ICD-10-CM

## 2020-06-09 PROCEDURE — G0439 PPPS, SUBSEQ VISIT: HCPCS | Performed by: FAMILY MEDICINE

## 2020-06-09 NOTE — PROGRESS NOTES
The ABCs of the Annual Wellness Visit  Subsequent Medicare Wellness Visit    Chief Complaint   Patient presents with   • Medicare Wellness-subsequent       Subjective   History of Present Illness:  Melany Lloyd is a 69 y.o. female who presents for a Subsequent Medicare Wellness Visit.    Vitamin D deficiency: Last vitamin D level was normal in June 2019.     Systemic lupus: Follows with rheumatology.  Last visit was December 2019.  Per last note stable despite being off Plaquenil.  History of fibromyalgia stable as well.  Rheumatology also discussed mildly abnormal liver enzymes.  They will closely monitor.  Last visit with GI was December 2019.  Liver enzymes mildly elevated but better than before.  GGT normal.  Next follow-up is July 2020.    Colonoscopy: Up-to-date  Tdap: Up-to-date  Mammogram: Up-to-date  DEXA scan: Due July 2020; Last DEXA scan was June 2018.  Showed osteopenia with increased risk for fracture.  Shingrix vaccine: Got first one; second one due will get this week.    Patient has a history of solitary pulmonary nodule.  Last CT chest was done February 2019.  Showed stable right upper lobe nodule as well as stable splenic artery aneurysm.  Recommended to repeat with follow-up CT of chest in 6 to 9 months.  Order was placed however it does not look with this as been done yet.      HEALTH RISK ASSESSMENT    Recent Hospitalizations:  No hospitalization(s) within the last year.    Current Medical Providers:  Patient Care Team:  Alma Jack MD as PCP - General (Family Medicine)  Alma Jack MD as PCP - Claims Attributed  Luz Maria Espinosa MD as Consulting Physician (Rheumatology)  Luz Maria Espinosa MD as Consulting Physician (Rheumatology)  Christian Sanders MD as Surgeon (Orthopedic Surgery)  Page Bro MD as Consulting Physician (Ophthalmology)    Smoking Status:  Social History     Tobacco Use   Smoking Status Never Smoker   Smokeless Tobacco Never Used       Alcohol Consumption:  Social History      Substance and Sexual Activity   Alcohol Use Yes    Comment: Occasional.        Depression Screen:   PHQ-2/PHQ-9 Depression Screening 6/9/2020   Little interest or pleasure in doing things 0   Feeling down, depressed, or hopeless 1   Trouble falling or staying asleep, or sleeping too much -   Feeling tired or having little energy -   Poor appetite or overeating -   Feeling bad about yourself - or that you are a failure or have let yourself or your family down -   Trouble concentrating on things, such as reading the newspaper or watching television -   Moving or speaking so slowly that other people could have noticed. Or the opposite - being so fidgety or restless that you have been moving around a lot more than usual -   Thoughts that you would be better off dead, or of hurting yourself in some way -   Total Score 1   If you checked off any problems, how difficult have these problems made it for you to do your work, take care of things at home, or get along with other people? -       Fall Risk Screen:  JJ Fall Risk Assessment was completed, and patient is at LOW risk for falls.Assessment completed on:6/9/2020    Health Habits and Functional and Cognitive Screening:  Functional & Cognitive Status 6/9/2020   Do you have difficulty preparing food and eating? No   Do you have difficulty bathing yourself, getting dressed or grooming yourself? No   Do you have difficulty using the toilet? No   Do you have difficulty moving around from place to place? No   Do you have trouble with steps or getting out of a bed or a chair? No   Current Diet Well Balanced Diet   Dental Exam Not up to date   Eye Exam Up to date   Exercise (times per week) 3 times per week   Current Exercise Activities Include Walking   Do you need help using the phone?  No   Are you deaf or do you have serious difficulty hearing?  Yes   Do you need help with transportation? No   Do you need help shopping? No   Do you need help preparing meals?  No   Do  you need help with housework?  No   Do you need help with laundry? No   Do you need help taking your medications? No   Do you need help managing money? No   Do you ever drive or ride in a car without wearing a seat belt? Yes   Have you felt unusual stress, anger or loneliness in the last month? -   Who do you live with? -   If you need help, do you have trouble finding someone available to you? -   Have you been bothered in the last four weeks by sexual problems? -   Do you have difficulty concentrating, remembering or making decisions? -         Does the patient have evidence of cognitive impairment? No    Asprin use counseling:Start ASA 81 mg daily     Age-appropriate Screening Schedule:  Refer to the list below for future screening recommendations based on patient's age, sex and/or medical conditions. Orders for these recommended tests are listed in the plan section. The patient has been provided with a written plan.    Health Maintenance   Topic Date Due   • ZOSTER VACCINE (2 of 3) 07/27/2013   • DXA SCAN  07/02/2020   • INFLUENZA VACCINE  08/01/2020   • MAMMOGRAM  07/12/2021   • TDAP/TD VACCINES (2 - Td) 02/04/2026   • COLONOSCOPY  06/04/2026          The following portions of the patient's history were reviewed and updated as appropriate: allergies, current medications, past family history, past medical history, past social history, past surgical history and problem list.    Outpatient Medications Prior to Visit   Medication Sig Dispense Refill   • Cholecalciferol (VITAMIN D) 2000 UNITS capsule Take 1,000 Units by mouth Daily.     • clobetasol (TEMOVATE) 0.05 % cream Apply  topically to the appropriate area as directed 2 (Two) Times a Day. 30 g 0   • Flaxseed, Linseed, (FLAX SEED OIL PO) Take 2 capsules by mouth daily.     • FOLIC ACID PO Take  by mouth.     • Naproxen Sodium 220 MG capsule take 1 po daily as needed     • NON FORMULARY Pain cream     • Omega-3 Fatty Acids (FISH OIL PO) Take 1 tablet by mouth  Daily.     • Probiotic Product (PROBIOTIC ADVANCED PO) take 1 po daily     • vitamin B-12 (CYANOCOBALAMIN) 1000 MCG tablet Take 1 tablet by mouth.     • vitamin B-6 (PYRIDOXINE) 50 MG tablet Take 50 mg by mouth Daily.       No facility-administered medications prior to visit.        Patient Active Problem List   Diagnosis   • GERD (gastroesophageal reflux disease)   • Systemic lupus erythematosus, organ or system involvement unspecified (CMS/HCC)   • DDD (degenerative disc disease), lumbosacral   • Fibromyalgia   • Familial tremor   • Abnormal MRA, brain   • Cervical disc disorder at C5-C6 level with radiculopathy   • Aneurysm of other specified arteries (CMS/HCC)   • Solitary pulmonary nodule   • Vitamin D deficiency   • Mild episode of depression (CMS/HCC)   • Elevated liver enzymes   • Body mass index (bmi) 25.0-25.9, adult   • Osteoarthritis of both feet       Advanced Care Planning:  ACP discussion was held with the patient during this visit. Patient does not have an advance directive, information provided.    Review of Systems   Constitutional: Negative for fever.   HENT: Negative for nosebleeds and trouble swallowing.    Eyes: Negative for visual disturbance.   Respiratory: Negative for choking and stridor.    Cardiovascular: Negative for chest pain.   Gastrointestinal: Negative for blood in stool.   Endocrine: Negative for polydipsia.   Genitourinary: Negative for genital sores and hematuria.   Musculoskeletal: Negative for joint swelling.   Skin: Negative for color change and rash.   Allergic/Immunologic: Negative for immunocompromised state.   Neurological: Negative for seizures, facial asymmetry and speech difficulty.   Hematological: Negative for adenopathy.   Psychiatric/Behavioral: Negative for behavioral problems, self-injury and suicidal ideas.       Compared to one year ago, the patient feels her physical health is the same.  Compared to one year ago, the patient feels her mental health is the  "same.    Reviewed chart for potential of high risk medication in the elderly: yes  Reviewed chart for potential of harmful drug interactions in the elderly:yes    Objective         Vitals:    06/09/20 1102   BP: 135/84   Pulse: 61   Resp: 16   Temp: 97 °F (36.1 °C)   SpO2: 96%   Weight: 72.6 kg (160 lb)   Height: 172.7 cm (67.99\")       Body mass index is 24.33 kg/m².  Discussed the patient's BMI with her. The BMI is in the acceptable range.    Physical Exam   Constitutional: She is oriented to person, place, and time. She appears well-developed and well-nourished.   HENT:   Head: Normocephalic and atraumatic.   Right Ear: External ear normal.   Left Ear: External ear normal.   Nose: Nose normal.   Mouth/Throat: Oropharynx is clear and moist.   Eyes: Pupils are equal, round, and reactive to light. Conjunctivae and EOM are normal.   Neck: Normal range of motion.   Cardiovascular: Normal rate and regular rhythm.   Pulmonary/Chest: Effort normal and breath sounds normal. No stridor. No respiratory distress.   Abdominal: Soft. Bowel sounds are normal. She exhibits no distension. There is no tenderness.   Neurological: She is alert and oriented to person, place, and time.   Skin: Skin is warm.   Psychiatric: She has a normal mood and affect. Her behavior is normal.             Assessment/Plan   Medicare Risks and Personalized Health Plan  CMS Preventative Services Quick Reference  Advance Directive Discussion  Cardiovascular risk  Depression/Dysphoria  Immunizations Discussed/Encouraged (specific immunizations; Shingrix )  Osteoprorosis Risk    The above risks/problems have been discussed with the patient.  Pertinent information has been shared with the patient in the After Visit Summary.  Follow up plans and orders are seen below in the Assessment/Plan Section.    Diagnoses and all orders for this visit:    1. Medicare annual wellness visit, subsequent (Primary)  -     Comprehensive Metabolic Panel  -     CBC & " Differential  -     Lipid Panel  -     TSH Rfx On Abnormal To Free T4    2. Body mass index (bmi) 25.0-25.9, adult  -     Comprehensive Metabolic Panel  -     CBC & Differential  -     Lipid Panel  -     TSH Rfx On Abnormal To Free T4    3. Elevated liver enzymes  -     Comprehensive Metabolic Panel  -     CBC & Differential  -     Lipid Panel  -     TSH Rfx On Abnormal To Free T4    4. Fibromyalgia  -     Comprehensive Metabolic Panel  -     CBC & Differential  -     Lipid Panel  -     TSH Rfx On Abnormal To Free T4    5. Gastroesophageal reflux disease, esophagitis presence not specified  -     Comprehensive Metabolic Panel  -     CBC & Differential  -     Lipid Panel  -     TSH Rfx On Abnormal To Free T4    6. Solitary pulmonary nodule on lung CT  -     CT Chest Without Contrast; Future    7. Aneurysm of other specified arteries (CMS/HCC)  -     CT Chest Without Contrast; Future    8. Supraclavicular fossa fullness  -     XR Shoulder 2+ View Right (In Office); Future  -     US Head Neck Soft Tissue; Future    9. Hot flashes  -     Ambulatory Referral to Gynecology    10. History of total hysterectomy with bilateral salpingo-oophorectomy (BSO)  -     Ambulatory Referral to Gynecology    11. Osteopenia, unspecified location  -     DEXA Bone Density Axial; Future    12. Abnormal findings on diagnostic imaging of other parts of musculoskeletal system   -     DEXA Bone Density Axial; Future      Follow Up:  No follow-ups on file.     An After Visit Summary and PPPS were given to the patient.       Low glycemic index diet  Exercise 30 minutes most days of the week  Make sure you get results on any labs or tests we ordered today  We discussed medications and how to take them as prescribed  Sleep 6-8 hours each night if possible  If you have not signed up for [employer], please activate your code ASAP from your After Visit Summary today    LDL goal <100  LDL goal if heart disease <70  HDL goal >60  Triglyceride goal  <150  BP goal =<130/80  Fasting glucose <100

## 2020-07-01 LAB
ALBUMIN SERPL-MCNC: 4.4 G/DL (ref 3.5–5.2)
ALBUMIN/GLOB SERPL: 1.7 G/DL
ALP SERPL-CCNC: 41 U/L (ref 39–117)
ALT SERPL-CCNC: 29 U/L (ref 1–33)
AST SERPL-CCNC: 54 U/L (ref 1–32)
BASOPHILS # BLD AUTO: 0.07 10*3/MM3 (ref 0–0.2)
BASOPHILS NFR BLD AUTO: 1.4 % (ref 0–1.5)
BILIRUB SERPL-MCNC: 0.4 MG/DL (ref 0.2–1.2)
BUN SERPL-MCNC: 18 MG/DL (ref 8–23)
BUN/CREAT SERPL: 30 (ref 7–25)
CALCIUM SERPL-MCNC: 9.1 MG/DL (ref 8.6–10.5)
CHLORIDE SERPL-SCNC: 103 MMOL/L (ref 98–107)
CHOLEST SERPL-MCNC: 198 MG/DL (ref 0–200)
CO2 SERPL-SCNC: 24.1 MMOL/L (ref 22–29)
CREAT SERPL-MCNC: 0.6 MG/DL (ref 0.57–1)
EOSINOPHIL # BLD AUTO: 0.13 10*3/MM3 (ref 0–0.4)
EOSINOPHIL NFR BLD AUTO: 2.7 % (ref 0.3–6.2)
ERYTHROCYTE [DISTWIDTH] IN BLOOD BY AUTOMATED COUNT: 12.7 % (ref 12.3–15.4)
GLOBULIN SER CALC-MCNC: 2.6 GM/DL
GLUCOSE SERPL-MCNC: 103 MG/DL (ref 65–99)
HCT VFR BLD AUTO: 38 % (ref 34–46.6)
HDLC SERPL-MCNC: 91 MG/DL (ref 40–60)
HGB BLD-MCNC: 13 G/DL (ref 12–15.9)
IMM GRANULOCYTES # BLD AUTO: 0.01 10*3/MM3 (ref 0–0.05)
IMM GRANULOCYTES NFR BLD AUTO: 0.2 % (ref 0–0.5)
LDLC SERPL CALC-MCNC: 97 MG/DL (ref 0–100)
LYMPHOCYTES # BLD AUTO: 1.37 10*3/MM3 (ref 0.7–3.1)
LYMPHOCYTES NFR BLD AUTO: 28.4 % (ref 19.6–45.3)
MCH RBC QN AUTO: 31.3 PG (ref 26.6–33)
MCHC RBC AUTO-ENTMCNC: 34.2 G/DL (ref 31.5–35.7)
MCV RBC AUTO: 91.6 FL (ref 79–97)
MONOCYTES # BLD AUTO: 0.51 10*3/MM3 (ref 0.1–0.9)
MONOCYTES NFR BLD AUTO: 10.6 % (ref 5–12)
NEUTROPHILS # BLD AUTO: 2.74 10*3/MM3 (ref 1.7–7)
NEUTROPHILS NFR BLD AUTO: 56.7 % (ref 42.7–76)
NRBC BLD AUTO-RTO: 0 /100 WBC (ref 0–0.2)
PLATELET # BLD AUTO: 321 10*3/MM3 (ref 140–450)
POTASSIUM SERPL-SCNC: 4.5 MMOL/L (ref 3.5–5.2)
PROT SERPL-MCNC: 7 G/DL (ref 6–8.5)
RBC # BLD AUTO: 4.15 10*6/MM3 (ref 3.77–5.28)
SODIUM SERPL-SCNC: 140 MMOL/L (ref 136–145)
TRIGL SERPL-MCNC: 51 MG/DL (ref 0–150)
TSH SERPL DL<=0.005 MIU/L-ACNC: 3.37 UIU/ML (ref 0.27–4.2)
VLDLC SERPL CALC-MCNC: 10.2 MG/DL
WBC # BLD AUTO: 4.83 10*3/MM3 (ref 3.4–10.8)

## 2020-07-15 DIAGNOSIS — R74.8 ELEVATED LIVER ENZYMES: Primary | ICD-10-CM

## 2020-07-20 ENCOUNTER — HOSPITAL ENCOUNTER (OUTPATIENT)
Dept: CT IMAGING | Facility: HOSPITAL | Age: 70
Discharge: HOME | End: 2020-07-20
Admitting: FAMILY MEDICINE

## 2020-07-20 ENCOUNTER — HOSPITAL ENCOUNTER (OUTPATIENT)
Dept: BONE DENSITY | Facility: HOSPITAL | Age: 70
Discharge: HOME | End: 2020-07-20

## 2020-07-20 ENCOUNTER — HOSPITAL ENCOUNTER (OUTPATIENT)
Dept: GENERAL RADIOLOGY | Facility: HOSPITAL | Age: 70
Discharge: HOME | End: 2020-07-20

## 2020-07-20 ENCOUNTER — HOSPITAL ENCOUNTER (OUTPATIENT)
Dept: ULTRASOUND IMAGING | Facility: HOSPITAL | Age: 70
Discharge: HOME | End: 2020-07-20

## 2020-07-20 DIAGNOSIS — R93.7 ABNORMAL FINDINGS ON DIAGNOSTIC IMAGING OF OTHER PARTS OF MUSCULOSKELETAL SYSTEM: ICD-10-CM

## 2020-07-20 DIAGNOSIS — R22.2 SUPRACLAVICULAR FOSSA FULLNESS: ICD-10-CM

## 2020-07-20 DIAGNOSIS — I72.8 ANEURYSM OF OTHER SPECIFIED ARTERIES (HCC): ICD-10-CM

## 2020-07-20 DIAGNOSIS — M85.80 OSTEOPENIA, UNSPECIFIED LOCATION: ICD-10-CM

## 2020-07-20 DIAGNOSIS — R91.1 SOLITARY PULMONARY NODULE ON LUNG CT: ICD-10-CM

## 2020-07-20 PROCEDURE — 73030 X-RAY EXAM OF SHOULDER: CPT

## 2020-07-20 PROCEDURE — 77080 DXA BONE DENSITY AXIAL: CPT

## 2020-07-20 PROCEDURE — 76536 US EXAM OF HEAD AND NECK: CPT

## 2020-07-20 PROCEDURE — 71250 CT THORAX DX C-: CPT

## 2020-07-22 NOTE — PROGRESS NOTES
Continued osteopenia.  No significant change compared to 2018.  Continue vitamin D and calcium supplements.  Recheck in 2 years.

## 2020-07-22 NOTE — PROGRESS NOTES
No acute abnormalities on CT chest.  Pulmonary nodule is stable; sub-6 mm.  Stable since 2017.  Can be considered benign.  No abnormality in area supraclavicular fullness.  Subchondral cystic change present in the right clavicular head bone; unchanged since 2019.    Ultrasound also did not show any abnormality in right sided supraclavicular area.

## 2020-09-15 ENCOUNTER — RESULTS ENCOUNTER (OUTPATIENT)
Dept: FAMILY MEDICINE CLINIC | Facility: CLINIC | Age: 70
End: 2020-09-15

## 2020-09-15 DIAGNOSIS — R74.8 ELEVATED LIVER ENZYMES: ICD-10-CM

## 2020-10-12 ENCOUNTER — OFFICE VISIT (OUTPATIENT)
Dept: OBSTETRICS AND GYNECOLOGY | Age: 70
End: 2020-10-12

## 2020-10-12 VITALS
WEIGHT: 155 LBS | BODY MASS INDEX: 23.49 KG/M2 | DIASTOLIC BLOOD PRESSURE: 78 MMHG | HEIGHT: 68 IN | SYSTOLIC BLOOD PRESSURE: 140 MMHG

## 2020-10-12 DIAGNOSIS — N95.2 VAGINAL ATROPHY: Primary | ICD-10-CM

## 2020-10-12 DIAGNOSIS — N94.10 FEMALE DYSPAREUNIA: ICD-10-CM

## 2020-10-12 DIAGNOSIS — N32.81 OAB (OVERACTIVE BLADDER): ICD-10-CM

## 2020-10-12 DIAGNOSIS — R23.2 HOT FLASHES: ICD-10-CM

## 2020-10-12 PROCEDURE — 99203 OFFICE O/P NEW LOW 30 MIN: CPT | Performed by: OBSTETRICS & GYNECOLOGY

## 2020-10-12 RX ORDER — ESTRADIOL 10 UG/1
1 INSERT VAGINAL 2 TIMES WEEKLY
Qty: 8 EACH | Refills: 12 | Status: SHIPPED | OUTPATIENT
Start: 2020-10-12 | End: 2020-10-14 | Stop reason: SDUPTHER

## 2020-10-12 NOTE — PROGRESS NOTES
"Erwin Lloyd is a 70 y.o. female presents as new patient as referral from Dr. Jack for hot flashes , night sweats , started last september 2019  ,last pap 1993, hysterectomy 1992,  dexa 07/20/20 osteopenia , mmg 07/12/19 neg quita pt scheduled for mmg this yr in november , colonoscopy 2016.   Patient reports recently hot flashes and night sweats have improved. Discussed low dose paxil as treatment option and patient would like to wait and see.  Patient plans to start IMVEXXY daily for 2 weeks and then call me for appt after 2 months for follow-up.  Wants to wait until after husbands rotator cuff sx.      Patient does c/o vaginal dryness and painful intercourse and inability to have sex with  - discussed options and patient would like to try Imvexxy. Discussed that it could also address some urinary issues she is noting as far as OAB symptoms.     History of Present Illness    The following portions of the patient's history were reviewed and updated as appropriate: allergies, current medications, past family history, past medical history, past social history, past surgical history and problem list.    Review of Systems   Constitutional: Negative for chills, fatigue and fever.   Gastrointestinal: Negative for abdominal distention and abdominal pain.   Endocrine: Positive for heat intolerance.   Genitourinary: Positive for dyspareunia and vaginal pain. Negative for decreased urine volume, dysuria, urgency, vaginal bleeding and vaginal discharge.   All other systems reviewed and are negative.    /78   Ht 172.7 cm (67.99\")   Wt 70.3 kg (155 lb)   LMP  (LMP Unknown)   Breastfeeding No   BMI 23.57 kg/m²     Objective   Physical Exam  Vitals signs and nursing note reviewed.   Constitutional:       Appearance: Normal appearance. She is normal weight.   Pulmonary:      Effort: Pulmonary effort is normal.   Genitourinary:     General: Normal vulva.      Exam position: Supine.      Labia:   "       Right: No rash, tenderness or lesion.         Left: No rash, tenderness or lesion.       Urethra: No urethral pain or urethral lesion.      Comments: Pale vaginal mucosa c/w atrophy   Neurological:      Mental Status: She is alert and oriented to person, place, and time.   Psychiatric:         Mood and Affect: Mood normal.         Behavior: Behavior normal.           Assessment/Plan   Melany was seen today for establish care and gynecologic exam.    Diagnoses and all orders for this visit:    Vaginal atrophy  -     Estradiol (Imvexxy Maintenance Pack) 10 MCG insert; Insert 1 tablet into the vagina 2 (Two) Times a Week.    Female dyspareunia  -     Estradiol (Imvexxy Maintenance Pack) 10 MCG insert; Insert 1 tablet into the vagina 2 (Two) Times a Week.    OAB (overactive bladder)    Hot flashes      Counseling was given to patient for the following topics: instructions for management, prognosis, impressions, risks and benefits of treatment options and importance of treatment compliance . Total time of the encounter was 30 minutes and 25 minutes was spend counseling.

## 2020-10-14 DIAGNOSIS — N94.10 FEMALE DYSPAREUNIA: ICD-10-CM

## 2020-10-14 DIAGNOSIS — N95.2 VAGINAL ATROPHY: ICD-10-CM

## 2020-10-14 RX ORDER — ESTRADIOL 10 UG/1
1 INSERT VAGINAL 2 TIMES WEEKLY
Qty: 8 EACH | Refills: 12 | Status: SHIPPED | OUTPATIENT
Start: 2020-10-15 | End: 2020-12-17

## 2020-10-14 NOTE — TELEPHONE ENCOUNTER
(Ford pt) Pt called, does not want prescription Estradiol (Imvexxy Maintenance Pack) 10 MCG insert going to [facility].   Can you please recall prescription into [facility]?      [phone]

## 2020-10-16 ENCOUNTER — OFFICE VISIT (OUTPATIENT)
Dept: FAMILY MEDICINE CLINIC | Facility: CLINIC | Age: 70
End: 2020-10-16

## 2020-10-16 VITALS
RESPIRATION RATE: 16 BRPM | HEIGHT: 68 IN | SYSTOLIC BLOOD PRESSURE: 134 MMHG | WEIGHT: 157 LBS | TEMPERATURE: 96.9 F | OXYGEN SATURATION: 97 % | BODY MASS INDEX: 23.79 KG/M2 | DIASTOLIC BLOOD PRESSURE: 76 MMHG | HEART RATE: 67 BPM

## 2020-10-16 DIAGNOSIS — H91.90 DECREASED HEARING, UNSPECIFIED LATERALITY: ICD-10-CM

## 2020-10-16 DIAGNOSIS — H93.8X1 EAR CANAL MASS, RIGHT: Primary | ICD-10-CM

## 2020-10-16 PROCEDURE — 99213 OFFICE O/P EST LOW 20 MIN: CPT | Performed by: FAMILY MEDICINE

## 2020-10-16 NOTE — PROGRESS NOTES
Erwin Lloyd is a 70 y.o. female.     History of Present Illness     70-year-old female presents today to discuss right ear canal mass.  She went to [facility] to get audiology testing done because for several years she has had hearing issues in that she can hear no sounds however is difficult to distinguish words what other people are speaking or when she is on the phone.  Patient reports audiology testing was normal, I do not have a report for this.  However she does note that exam showed right ear canal mass.  Nontender nondraining.  Reports normal appetite no unintentional weight loss.    The following portions of the patient's history were reviewed and updated as appropriate: allergies, current medications, past family history, past medical history, past social history, past surgical history and problem list.    Review of Systems   Constitutional: Negative for chills and fever.   HENT: Negative for congestion.    Respiratory: Negative for cough and shortness of breath.    Cardiovascular: Negative for chest pain, palpitations and leg swelling.   Gastrointestinal: Negative for abdominal pain, diarrhea and vomiting.       Objective   Physical Exam  Constitutional:       Appearance: She is well-developed.   HENT:      Head: Normocephalic and atraumatic.      Right Ear: Tympanic membrane and external ear normal.      Left Ear: Tympanic membrane and external ear normal.      Ears:      Comments: Right ear canal mass scan images in media tab     Mouth/Throat:      Pharynx: Uvula midline.   Eyes:      Pupils: Pupils are equal, round, and reactive to light.   Cardiovascular:      Rate and Rhythm: Normal rate and regular rhythm.      Heart sounds: No murmur.   Pulmonary:      Effort: Pulmonary effort is normal. No respiratory distress.      Breath sounds: Normal breath sounds. No stridor. No wheezing or rales.   Skin:     General: Skin is warm.   Neurological:      Mental Status: She is alert.    Psychiatric:         Behavior: Behavior normal.                 Assessment/Plan     Diagnoses and all orders for this visit:    1. Ear canal mass, right (Primary)  -     Ambulatory Referral to ENT (Otolaryngology)    2. Decreased hearing, unspecified laterality  -     Ambulatory Referral to ENT (Otolaryngology)

## 2020-10-19 ENCOUNTER — TELEPHONE (OUTPATIENT)
Dept: OBSTETRICS AND GYNECOLOGY | Age: 70
End: 2020-10-19

## 2020-10-19 NOTE — TELEPHONE ENCOUNTER
(Branden pt) Pt called, was prescribed a prescription by Dr. Otero Estradiol (Imvexxy Maintenance Pack) 10 MCG insert.  Pt is not going to have it refilled because of three warning that pertain to her.    1) Pt has lupus.  2) elevated Liver enzymes   3) Alzheimer's runs in her family.    Pt would like to talk to either Dr. Otero or Guillermina.    [phone]

## 2020-10-19 NOTE — TELEPHONE ENCOUNTER
Please call patient - the risks of vaginal estrogen are EXTREMELY low. There is negligible absorption into the system but she should do what she is comfortable with.

## 2020-10-20 ENCOUNTER — TELEPHONE (OUTPATIENT)
Dept: OBSTETRICS AND GYNECOLOGY | Age: 70
End: 2020-10-20

## 2020-10-20 NOTE — TELEPHONE ENCOUNTER
Pt has been using the Imvexxy for 2 days and both days has had pink on her pad. Pt is wondering if this is normal. Pt also c/o vaginal irritation, very mild.

## 2020-11-09 ENCOUNTER — HOSPITAL ENCOUNTER (OUTPATIENT)
Dept: MAMMOGRAPHY | Facility: HOSPITAL | Age: 70
Discharge: HOME | End: 2020-11-09
Admitting: FAMILY MEDICINE

## 2020-11-09 DIAGNOSIS — Z12.39 SCREENING BREAST EXAMINATION: ICD-10-CM

## 2020-11-09 PROCEDURE — 77063 BREAST TOMOSYNTHESIS BI: CPT

## 2020-11-09 PROCEDURE — 77067 SCR MAMMO BI INCL CAD: CPT

## 2020-12-11 ENCOUNTER — TELEMEDICINE (OUTPATIENT)
Dept: FAMILY MEDICINE CLINIC | Facility: CLINIC | Age: 70
End: 2020-12-11

## 2020-12-11 VITALS — HEIGHT: 68 IN | BODY MASS INDEX: 23.19 KG/M2 | WEIGHT: 153 LBS

## 2020-12-11 DIAGNOSIS — M85.80 OSTEOPENIA, UNSPECIFIED LOCATION: Primary | ICD-10-CM

## 2020-12-11 DIAGNOSIS — M32.9 SYSTEMIC LUPUS ERYTHEMATOSUS, UNSPECIFIED SLE TYPE, UNSPECIFIED ORGAN INVOLVEMENT STATUS (HCC): ICD-10-CM

## 2020-12-11 DIAGNOSIS — N39.41 URGE INCONTINENCE OF URINE: ICD-10-CM

## 2020-12-11 PROCEDURE — 99214 OFFICE O/P EST MOD 30 MIN: CPT | Performed by: FAMILY MEDICINE

## 2020-12-17 ENCOUNTER — OFFICE VISIT (OUTPATIENT)
Dept: GASTROENTEROLOGY | Facility: CLINIC | Age: 70
End: 2020-12-17

## 2020-12-17 VITALS
DIASTOLIC BLOOD PRESSURE: 65 MMHG | WEIGHT: 155 LBS | SYSTOLIC BLOOD PRESSURE: 108 MMHG | HEIGHT: 68 IN | BODY MASS INDEX: 23.49 KG/M2

## 2020-12-17 DIAGNOSIS — Z80.0 FAMILY HISTORY OF COLON CANCER: ICD-10-CM

## 2020-12-17 DIAGNOSIS — R74.8 ELEVATED LIVER ENZYMES: Primary | ICD-10-CM

## 2020-12-17 LAB
ALBUMIN SERPL-MCNC: 4.7 G/DL (ref 3.5–5.2)
ALBUMIN/GLOB SERPL: 1.7 G/DL
ALP SERPL-CCNC: 42 U/L (ref 39–117)
ALT SERPL-CCNC: 33 U/L (ref 1–33)
AST SERPL-CCNC: 54 U/L (ref 1–32)
BILIRUB SERPL-MCNC: 0.4 MG/DL (ref 0–1.2)
BUN SERPL-MCNC: 19 MG/DL (ref 8–23)
BUN/CREAT SERPL: 32.2 (ref 7–25)
CALCIUM SERPL-MCNC: 10 MG/DL (ref 8.6–10.5)
CHLORIDE SERPL-SCNC: 99 MMOL/L (ref 98–107)
CK SERPL-CCNC: 144 U/L (ref 20–180)
CO2 SERPL-SCNC: 32.6 MMOL/L (ref 22–29)
CREAT SERPL-MCNC: 0.59 MG/DL (ref 0.57–1)
GGT SERPL-CCNC: 14 U/L (ref 5–36)
GLOBULIN SER CALC-MCNC: 2.7 GM/DL
GLUCOSE SERPL-MCNC: 85 MG/DL (ref 65–99)
POTASSIUM SERPL-SCNC: 5.1 MMOL/L (ref 3.5–5.2)
PROT SERPL-MCNC: 7.4 G/DL (ref 6–8.5)
SODIUM SERPL-SCNC: 139 MMOL/L (ref 136–145)

## 2020-12-17 PROCEDURE — 99214 OFFICE O/P EST MOD 30 MIN: CPT | Performed by: INTERNAL MEDICINE

## 2020-12-17 RX ORDER — SODIUM CHLORIDE, SODIUM LACTATE, POTASSIUM CHLORIDE, CALCIUM CHLORIDE 600; 310; 30; 20 MG/100ML; MG/100ML; MG/100ML; MG/100ML
30 INJECTION, SOLUTION INTRAVENOUS CONTINUOUS
Status: CANCELLED | OUTPATIENT
Start: 2021-03-05

## 2020-12-17 NOTE — PROGRESS NOTES
Chief Complaint   Patient presents with   • Abnormal Lab       Subjective     HPI    Melany Lloyd is a 70 y.o. female with a past medical history noted below who presents for follow up.  She was seen last year for elevated transaminases.  She also has a history of lupus, not on any medications.  Serologic work-up for autoimmune and genetic liver diseases was negative.  Liver US normal    June labs showed that her AST remained elevated at 54.    She is not on any meds forher lupus.  Avoiding steroids    Last colonoscopy 2016 with [facility] and normal.  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  She drank more heavily in the past, an 8 month period in 2016 where she drank 2 drinks a night.  Never any more serious ETOH issues    Takes naproxen once daily.     No heartburn or acid reflux.      Denies myalgias, does get some leg cramps      Today's visit was in the office.  Both the patient and I were wearing face masks and proper hand hygiene was performed before and after the physical exam.       Past Medical History:   Diagnosis Date   • Allergic    • Anxiety    • Breast cyst    • Cataract    • Chronic headache    • DDD (degenerative disc disease), lumbosacral    • Dyspnea on exertion 8/19/2016   • Fibromyalgia    • Fibromyalgia, primary    • Injury of back    • Leaking of urine    • Left-sided Bell's palsy    • Lupus (CMS/HCC)    • Medication management    • OP (osteoporosis)    • Osteoporosis 8/19/2016   • Shortness of breath    • SLE (systemic lupus erythematosus) (CMS/HCC)    • Urinary tract infection          Current Outpatient Medications:   •  Cholecalciferol (VITAMIN D) 2000 UNITS capsule, Take 1,000 Units by mouth Daily., Disp: , Rfl:   •  Flaxseed, Linseed, (FLAX SEED OIL PO), Take 2 capsules by mouth daily., Disp: , Rfl:   •  FOLIC ACID PO, Take  by mouth., Disp: , Rfl:   •  Naproxen Sodium 220 MG capsule, take 1 po daily as needed, Disp: , Rfl:   •  NON FORMULARY, Pain cream, Disp: ,  Rfl:   •  Omega-3 Fatty Acids (FISH OIL PO), Take 1 tablet by mouth Daily., Disp: , Rfl:   •  Probiotic Product (PROBIOTIC ADVANCED PO), take 1 po daily, Disp: , Rfl:   •  vitamin B-12 (CYANOCOBALAMIN) 1000 MCG tablet, Take 1 tablet by mouth., Disp: , Rfl:   •  vitamin B-6 (PYRIDOXINE) 50 MG tablet, Take 50 mg by mouth Daily., Disp: , Rfl:     Allergies   Allergen Reactions   • Diphenhydramine Hcl Itching   • Penicillin G Rash   • Penicillins Itching and Rash   • Red Dye Itching and Rash       Social History     Socioeconomic History   • Marital status:      Spouse name: Not on file   • Number of children: Not on file   • Years of education: Not on file   • Highest education level: Not on file   Tobacco Use   • Smoking status: Never Smoker   • Smokeless tobacco: Never Used   Substance and Sexual Activity   • Alcohol use: Yes     Comment: Occasional.    • Drug use: No   • Sexual activity: Yes     Partners: Male       Family History   Problem Relation Age of Onset   • Stroke Mother    • Alzheimer's disease Mother    • Prostate cancer Father    • Heart disease Father    • Hypertension Father    • Mitral valve prolapse Father    • COPD Brother    • Diabetes Brother    • Arthritis Brother    • Mitral valve prolapse Brother    • Cancer Brother         bladder   • Heart disease Sister    • Hypertension Sister    • Colon cancer Sister    • Hypothyroidism Sister    • Dementia Sister    • Rheum arthritis Maternal Grandfather    • Alcohol abuse Maternal Grandfather    • Ovarian cancer Maternal Aunt    • Breast cancer Neg Hx    • Uterine cancer Neg Hx        Review of Systems   Constitutional: Positive for fatigue. Negative for activity change and appetite change.   HENT: Positive for mouth sores. Negative for sore throat and trouble swallowing.    Respiratory: Negative.    Cardiovascular: Negative.    Gastrointestinal: Negative for abdominal distention, abdominal pain and blood in stool.   Endocrine: Negative for  polydipsia, polyphagia and polyuria.   Genitourinary: Negative for difficulty urinating, dysuria and frequency.   Musculoskeletal: Positive for arthralgias. Negative for back pain and myalgias.        Leg cramps   Skin: Negative.    Hematological: Negative for adenopathy. Does not bruise/bleed easily.   All other systems reviewed and are negative.      Objective     Vitals:    12/17/20 1030   BP: 108/65         12/17/20  1030   Weight: 70.3 kg (155 lb)     Body mass index is 23.57 kg/m².    Physical Exam  Vitals signs reviewed.   Constitutional:       General: She is not in acute distress.     Appearance: Normal appearance. She is well-developed. She is not diaphoretic.   HENT:      Head: Normocephalic and atraumatic.      Right Ear: External ear normal.      Left Ear: External ear normal.      Nose: Nose normal.   Eyes:      General: No scleral icterus.        Right eye: No discharge.         Left eye: No discharge.      Conjunctiva/sclera: Conjunctivae normal.   Neck:      Musculoskeletal: Normal range of motion and neck supple.      Thyroid: No thyromegaly.      Comments: No supraclavicular adenopathy  Cardiovascular:      Rate and Rhythm: Normal rate and regular rhythm.      Heart sounds: Normal heart sounds. No murmur. No gallop.       Comments: No lower extremity edema  Pulmonary:      Effort: Pulmonary effort is normal. No respiratory distress.      Breath sounds: Normal breath sounds. No wheezing.   Abdominal:      General: Bowel sounds are normal. There is no distension.      Palpations: Abdomen is soft. Abdomen is not rigid. There is no mass.      Tenderness: There is no abdominal tenderness. There is no guarding or rebound.      Hernia: No hernia is present.   Genitourinary:     Comments: Rectal exam deferred  Musculoskeletal: Normal range of motion.         General: No tenderness.      Comments: No atrophy of upper or lower extremities.  Normal digits and nails of both hands.   Lymphadenopathy:       Cervical: No cervical adenopathy.   Skin:     General: Skin is warm and dry.      Findings: No erythema or rash.   Neurological:      Mental Status: She is alert and oriented to person, place, and time.      Motor: No atrophy.      Coordination: Coordination normal.   Psychiatric:         Behavior: Behavior normal.         Thought Content: Thought content normal.         Judgment: Judgment normal.         WBC   Date Value Ref Range Status   06/30/2020 4.83 3.40 - 10.80 10*3/mm3 Final     RBC   Date Value Ref Range Status   06/30/2020 4.15 3.77 - 5.28 10*6/mm3 Final     Hemoglobin   Date Value Ref Range Status   06/30/2020 13.0 12.0 - 15.9 g/dL Final   01/05/2018 13.5 11.9 - 15.5 g/dL Final     Hematocrit   Date Value Ref Range Status   06/30/2020 38.0 34.0 - 46.6 % Final   01/05/2018 40.0 35.6 - 45.5 % Final     MCV   Date Value Ref Range Status   06/30/2020 91.6 79.0 - 97.0 fL Final   01/05/2018 92.4 80.5 - 98.2 fL Final     MCH   Date Value Ref Range Status   06/30/2020 31.3 26.6 - 33.0 pg Final   01/05/2018 31.2 26.9 - 32.0 pg Final     MCHC   Date Value Ref Range Status   06/30/2020 34.2 31.5 - 35.7 g/dL Final   01/05/2018 33.8 32.4 - 36.3 g/dL Final     RDW   Date Value Ref Range Status   06/30/2020 12.7 12.3 - 15.4 % Final   01/05/2018 13.6 (H) 11.7 - 13.0 % Final     RDW-SD   Date Value Ref Range Status   01/05/2018 46.9 37.0 - 54.0 fl Final     MPV   Date Value Ref Range Status   01/05/2018 9.1 6.0 - 12.0 fL Final     Platelets   Date Value Ref Range Status   06/30/2020 321 140 - 450 10*3/mm3 Final   01/05/2018 292 140 - 500 10*3/mm3 Final     Neutrophil Rel %   Date Value Ref Range Status   06/30/2020 56.7 42.7 - 76.0 % Final     Lymphocyte Rel %   Date Value Ref Range Status   06/30/2020 28.4 19.6 - 45.3 % Final     Monocyte Rel %   Date Value Ref Range Status   06/30/2020 10.6 5.0 - 12.0 % Final     Eosinophil Rel %   Date Value Ref Range Status   06/30/2020 2.7 0.3 - 6.2 % Final     Basophil Rel %   Date  Value Ref Range Status   06/30/2020 1.4 0.0 - 1.5 % Final     Neutrophils Absolute   Date Value Ref Range Status   06/30/2020 2.74 1.70 - 7.00 10*3/mm3 Final     Lymphocytes Absolute   Date Value Ref Range Status   06/30/2020 1.37 0.70 - 3.10 10*3/mm3 Final     Monocytes Absolute   Date Value Ref Range Status   06/30/2020 0.51 0.10 - 0.90 10*3/mm3 Final     Eosinophils Absolute   Date Value Ref Range Status   06/30/2020 0.13 0.00 - 0.40 10*3/mm3 Final     Basophils Absolute   Date Value Ref Range Status   06/30/2020 0.07 0.00 - 0.20 10*3/mm3 Final     nRBC   Date Value Ref Range Status   06/30/2020 0.0 0.0 - 0.2 /100 WBC Final       Glucose   Date Value Ref Range Status   01/05/2018 88 65 - 99 mg/dL Final     Sodium   Date Value Ref Range Status   06/30/2020 140 136 - 145 mmol/L Final   01/05/2018 141 136 - 145 mmol/L Final     Potassium   Date Value Ref Range Status   06/30/2020 4.5 3.5 - 5.2 mmol/L Final   01/05/2018 4.8 3.5 - 5.2 mmol/L Final     CO2   Date Value Ref Range Status   01/05/2018 29.3 (H) 22.0 - 29.0 mmol/L Final     Total CO2   Date Value Ref Range Status   06/30/2020 24.1 22.0 - 29.0 mmol/L Final     Chloride   Date Value Ref Range Status   06/30/2020 103 98 - 107 mmol/L Final   01/05/2018 100 98 - 107 mmol/L Final     Anion Gap   Date Value Ref Range Status   01/05/2018 11.7 mmol/L Final     Creatinine   Date Value Ref Range Status   06/30/2020 0.60 0.57 - 1.00 mg/dL Final   01/05/2018 0.55 (L) 0.57 - 1.00 mg/dL Final     BUN   Date Value Ref Range Status   06/30/2020 18 8 - 23 mg/dL Final   01/05/2018 18 8 - 23 mg/dL Final     BUN/Creatinine Ratio   Date Value Ref Range Status   06/30/2020 30.0 (H) 7.0 - 25.0 Final   01/05/2018 32.7 (H) 7.0 - 25.0 Final     Calcium   Date Value Ref Range Status   06/30/2020 9.1 8.6 - 10.5 mg/dL Final   01/05/2018 9.7 8.6 - 10.5 mg/dL Final     eGFR Non  Am   Date Value Ref Range Status   06/30/2020 100 >60 mL/min/1.73 Final     eGFR Non African Amer   Date  Value Ref Range Status   01/05/2018 111 >60 mL/min/1.73 Final     Alkaline Phosphatase   Date Value Ref Range Status   06/30/2020 41 39 - 117 U/L Final     ALT (SGPT)   Date Value Ref Range Status   06/30/2020 29 1 - 33 U/L Final     AST (SGOT)   Date Value Ref Range Status   06/30/2020 54 (H) 1 - 32 U/L Final     Total Bilirubin   Date Value Ref Range Status   06/30/2020 0.4 0.2 - 1.2 mg/dL Final     Albumin   Date Value Ref Range Status   06/30/2020 4.40 3.50 - 5.20 g/dL Final     A/G Ratio   Date Value Ref Range Status   06/30/2020 1.7 g/dL Final         Imaging Results (Last 7 Days)     ** No results found for the last 168 hours. **            No notes on file    Assessment/Plan      1. Elevated liver enzymes: ? Lupus nephritis, possibly ETOH but she drinks regularly but small amoutns    2. Family history of colon cancer: last colonoscopy 2016    3. Lupus:  No meds, followed by Dr Espinosa, ? Impact on her liver    Plan  Repeat CMP today along with ggt, CK  Colonoscopy 2021  If AST remains high, will consider trial of ETOH abstinence to determine if this is the culprit    Diagnoses and all orders for this visit:    1. Elevated liver enzymes (Primary)  -     Comprehensive Metabolic Panel  -     Gamma GT  -     CK    2. Family history of colon cancer  -     Case Request; Standing  -     Follow Anesthesia Guidelines / Standing Orders; Future  -     Obtain Informed Consent; Future  -     Implement Anesthesia Orders Day of Procedure; Standing  -     Obtain Informed Consent; Standing  -     Verify bowel prep was successful; Standing  -     lactated ringers infusion  -     Case Request        I have discussed the above plan with the patient.  They verbalize understanding and are in agreement with the plan.  They have been advised to contact the office for any questions, concerns, or changes related to their health.    Dictated utilizing Dragon dictation

## 2020-12-22 ENCOUNTER — TELEPHONE (OUTPATIENT)
Dept: GASTROENTEROLOGY | Facility: CLINIC | Age: 70
End: 2020-12-22

## 2020-12-22 DIAGNOSIS — R74.8 ELEVATED LIVER ENZYMES: Primary | ICD-10-CM

## 2020-12-22 NOTE — TELEPHONE ENCOUNTER
----- Message from Lorraine Mejia MD sent at 12/22/2020 11:15 AM EST -----  Her AST level remains elevated    I would like for her to take 4 weeks to completely eliminate all alcohol consumption.  Then will recheck her CMP.  I have placed the future order for sometime early February.

## 2020-12-22 NOTE — PROGRESS NOTES
Her AST level remains elevated    I would like for her to take 4 weeks to completely eliminate all alcohol consumption.  Then will recheck her CMP.  I have placed the future order for sometime early February.

## 2021-02-08 ENCOUNTER — LAB (OUTPATIENT)
Dept: GASTROENTEROLOGY | Facility: CLINIC | Age: 71
End: 2021-02-08

## 2021-02-09 ENCOUNTER — TELEPHONE (OUTPATIENT)
Dept: GASTROENTEROLOGY | Facility: CLINIC | Age: 71
End: 2021-02-09

## 2021-02-09 NOTE — TELEPHONE ENCOUNTER
----- Message from Lorraine Mejia MD sent at 2/9/2021 12:11 PM EST -----  Liver numbers remain mildly elevated.  Will continue to monitor.

## 2021-02-10 NOTE — TELEPHONE ENCOUNTER
"Call to pt.  Advise per DR Mejia note  Verb understanding.     States sister recently  of metastatic colon ca after 3 months of \"monitoring symptoms and doing nothing\".      States her liver numbers have been gradually rising over past several years.  Would like to explore what is causing this and come up with solution rather than waiting for emergency situation.     Request to DR Mejia.   "

## 2021-02-10 NOTE — TELEPHONE ENCOUNTER
I understand her concerns.  From my standpoint, testing does not indicate that the elevation in her AST and ALT are coming from the liver as another lab test (the GGT) is not elevated. As such, I am a little hesitant to pursue a liver biopsy but I am happy to discuss this with her if that is the direction she wants to go.  I am happy to discuss at her upcoming colonoscopy.  I would  like to reassure her that her imaging has been reassuring along with the very minimal elevation in her numbers and additional work-up that has been performed.

## 2021-02-10 NOTE — TELEPHONE ENCOUNTER
"Call to pt.  Advise per Dr Mejia note.  Verb understanding.  States \"I feel better\".  Looking forward to discussing further at c/s.    "

## 2021-02-24 ENCOUNTER — TRANSCRIBE ORDERS (OUTPATIENT)
Dept: SLEEP MEDICINE | Facility: HOSPITAL | Age: 71
End: 2021-02-24

## 2021-02-24 DIAGNOSIS — Z01.818 OTHER SPECIFIED PRE-OPERATIVE EXAMINATION: Primary | ICD-10-CM

## 2021-03-03 ENCOUNTER — LAB (OUTPATIENT)
Dept: LAB | Facility: HOSPITAL | Age: 71
End: 2021-03-03

## 2021-03-03 DIAGNOSIS — Z01.818 OTHER SPECIFIED PRE-OPERATIVE EXAMINATION: ICD-10-CM

## 2021-03-03 PROCEDURE — U0005 INFEC AGEN DETEC AMPLI PROBE: HCPCS

## 2021-03-03 PROCEDURE — C9803 HOPD COVID-19 SPEC COLLECT: HCPCS

## 2021-03-03 PROCEDURE — U0004 COV-19 TEST NON-CDC HGH THRU: HCPCS

## 2021-03-04 LAB — SARS-COV-2 RNA RESP QL NAA+PROBE: NOT DETECTED

## 2021-03-05 ENCOUNTER — HOSPITAL ENCOUNTER (OUTPATIENT)
Facility: HOSPITAL | Age: 71
Setting detail: HOSPITAL OUTPATIENT SURGERY
Discharge: HOME | End: 2021-03-05
Attending: INTERNAL MEDICINE | Admitting: INTERNAL MEDICINE

## 2021-03-05 ENCOUNTER — TELEPHONE (OUTPATIENT)
Dept: GASTROENTEROLOGY | Facility: CLINIC | Age: 71
End: 2021-03-05

## 2021-03-05 ENCOUNTER — ANESTHESIA (OUTPATIENT)
Dept: GASTROENTEROLOGY | Facility: HOSPITAL | Age: 71
End: 2021-03-05

## 2021-03-05 ENCOUNTER — ANESTHESIA EVENT (OUTPATIENT)
Dept: GASTROENTEROLOGY | Facility: HOSPITAL | Age: 71
End: 2021-03-05

## 2021-03-05 VITALS
OXYGEN SATURATION: 100 % | DIASTOLIC BLOOD PRESSURE: 78 MMHG | HEIGHT: 68 IN | SYSTOLIC BLOOD PRESSURE: 139 MMHG | HEART RATE: 54 BPM | BODY MASS INDEX: 23.34 KG/M2 | WEIGHT: 154 LBS | RESPIRATION RATE: 16 BRPM

## 2021-03-05 DIAGNOSIS — Z80.0 FAMILY HISTORY OF COLON CANCER: ICD-10-CM

## 2021-03-05 PROCEDURE — G0105 COLORECTAL SCRN; HI RISK IND: HCPCS | Performed by: INTERNAL MEDICINE

## 2021-03-05 PROCEDURE — 25010000002 PROPOFOL 10 MG/ML EMULSION: Performed by: ANESTHESIOLOGY

## 2021-03-05 PROCEDURE — S0260 H&P FOR SURGERY: HCPCS | Performed by: INTERNAL MEDICINE

## 2021-03-05 RX ORDER — SODIUM CHLORIDE, SODIUM LACTATE, POTASSIUM CHLORIDE, CALCIUM CHLORIDE 600; 310; 30; 20 MG/100ML; MG/100ML; MG/100ML; MG/100ML
30 INJECTION, SOLUTION INTRAVENOUS CONTINUOUS
Status: DISCONTINUED | OUTPATIENT
Start: 2021-03-05 | End: 2021-03-05 | Stop reason: HOSPADM

## 2021-03-05 RX ORDER — PROPOFOL 10 MG/ML
VIAL (ML) INTRAVENOUS AS NEEDED
Status: DISCONTINUED | OUTPATIENT
Start: 2021-03-05 | End: 2021-03-05 | Stop reason: SURG

## 2021-03-05 RX ORDER — PROPOFOL 10 MG/ML
VIAL (ML) INTRAVENOUS CONTINUOUS PRN
Status: DISCONTINUED | OUTPATIENT
Start: 2021-03-05 | End: 2021-03-05 | Stop reason: SURG

## 2021-03-05 RX ADMIN — PROPOFOL 100 MG: 10 INJECTION, EMULSION INTRAVENOUS at 08:07

## 2021-03-05 RX ADMIN — SODIUM CHLORIDE, POTASSIUM CHLORIDE, SODIUM LACTATE AND CALCIUM CHLORIDE 30 ML/HR: 600; 310; 30; 20 INJECTION, SOLUTION INTRAVENOUS at 07:45

## 2021-03-05 RX ADMIN — PROPOFOL 140 MCG/KG/MIN: 10 INJECTION, EMULSION INTRAVENOUS at 08:07

## 2021-03-05 NOTE — ANESTHESIA PREPROCEDURE EVALUATION
Anesthesia Evaluation     Patient summary reviewed and Nursing notes reviewed   NPO Solid Status: > 8 hours  NPO Liquid Status: > 2 hours           Airway   Mallampati: II  TM distance: >3 FB  Neck ROM: full  No difficulty expected  Dental      Pulmonary    (+) shortness of breath,   Cardiovascular - normal exam  Exercise tolerance: good (4-7 METS)    (+) LEE,   (-) angina      Neuro/Psych  (+) headaches, numbness (L bells palsy), psychiatric history Anxiety and Depression,     GI/Hepatic/Renal/Endo    (+)  GERD,      Musculoskeletal     (+) myalgias (fibromyalgia),   Abdominal    Substance History      OB/GYN          Other   arthritis,                      Anesthesia Plan    ASA 2     MAC     intravenous induction     Anesthetic plan, all risks, benefits, and alternatives have been provided, discussed and informed consent has been obtained with: patient.    Plan discussed with CRNA.

## 2021-03-05 NOTE — DISCHARGE INSTRUCTIONS
For the next 24 hours patient needs to be with a responsible adult.    For 24 hours DO NOT drive, operate machinery, appliances, drink alcohol, make important decisions or sign legal documents.    Start with a light or bland diet and advance to regular diet as tolerated.    Follow recommendations on procedure report provided by your doctor.    Call Dr Mejia for problems [phone]    Problems may include but not limited to: large amounts of bleeding, trouble breathing, repeated vomiting, severe unrelieved pain, fever or chills.

## 2021-03-05 NOTE — H&P
[facility]  Pre Procedure History & Physical    Chief Complaint: Family history of colon cancer      HPI: 70-year-old woman with past medical history as below.  She has a family history of colon cancer in her sister.  Her last colonoscopy was 5 years ago.  She otherwise feels well.    Past Medical History:   Past Medical History:   Diagnosis Date   • Allergic    • Anxiety    • Breast cyst    • Cataract    • Chronic headache    • DDD (degenerative disc disease), lumbosacral    • Dyspnea on exertion 8/19/2016   • Fibromyalgia    • Fibromyalgia, primary    • Injury of back    • Leaking of urine    • Left-sided Bell's palsy    • Lupus (CMS/HCC)    • Medication management    • OP (osteoporosis)    • Osteoporosis 8/19/2016   • Shortness of breath    • SLE (systemic lupus erythematosus) (CMS/HCC)    • Urinary tract infection        Family History:  Family History   Problem Relation Age of Onset   • Stroke Mother    • Alzheimer's disease Mother    • Prostate cancer Father    • Heart disease Father    • Hypertension Father    • Mitral valve prolapse Father    • COPD Brother    • Diabetes Brother    • Arthritis Brother    • Mitral valve prolapse Brother    • Cancer Brother         bladder   • Heart disease Sister    • Hypertension Sister    • Colon cancer Sister    • Hypothyroidism Sister    • Dementia Sister    • Rheum arthritis Maternal Grandfather    • Alcohol abuse Maternal Grandfather    • Ovarian cancer Maternal Aunt    • Breast cancer Neg Hx    • Uterine cancer Neg Hx        Social History:   reports that she has never smoked. She has never used smokeless tobacco. She reports current alcohol use. She reports that she does not use drugs.    Medications:   Medications Prior to Admission   Medication Sig Dispense Refill Last Dose   • Cholecalciferol (VITAMIN D) 2000 UNITS capsule Take 1,000 Units by mouth Daily.   3/4/2021 at Unknown time   • Flaxseed, Linseed, (FLAX SEED OIL PO) Take 2 capsules  "by mouth daily.   3/4/2021 at Unknown time   • FOLIC ACID PO Take  by mouth.   3/4/2021 at Unknown time   • NON FORMULARY Pain cream   3/4/2021 at Unknown time   • Omega-3 Fatty Acids (FISH OIL PO) Take 1 tablet by mouth Daily.   3/4/2021 at Unknown time   • Probiotic Product (PROBIOTIC ADVANCED PO) take 1 po daily   3/4/2021 at Unknown time   • vitamin B-12 (CYANOCOBALAMIN) 1000 MCG tablet Take 1 tablet by mouth.   3/4/2021 at Unknown time   • vitamin B-6 (PYRIDOXINE) 50 MG tablet Take 50 mg by mouth Daily.   3/4/2021 at Unknown time   • Naproxen Sodium 220 MG capsule take 1 po daily as needed   More than a month at Unknown time       Allergies:  Diphenhydramine hcl, Penicillin g, Penicillins, and Red dye    ROS:    Pertinent items are noted in HPI     Objective     Height 172.7 cm (68\"), weight 69.9 kg (154 lb), not currently breastfeeding.    Physical Exam   Constitutional: Pt is oriented to person, place, and time and well-developed, well-nourished, and in no distress.   HENT:   Mouth/Throat: Oropharynx is clear and moist.   Neck: Normal range of motion. Neck supple.   Cardiovascular: Normal rate, regular rhythm and normal heart sounds.    Pulmonary/Chest: Effort normal and breath sounds normal. No respiratory distress. No  wheezes.   Abdominal: Soft. Bowel sounds are normal.   Skin: Skin is warm and dry.   Psychiatric: Mood, memory, affect and judgment normal.     Assessment/Plan     Diagnosis: Family history colon cancer      Anticipated Surgical Procedure:    Colonoscopy    The risks, benefits, and alternatives of this procedure have been discussed with the patient or the responsible party- the patient understands and agrees to proceed.  "

## 2021-03-05 NOTE — ANESTHESIA POSTPROCEDURE EVALUATION
Patient: Melany Lloyd    Procedure Summary     Date: 03/05/21 Room / [facility]:  [facility]    Anesthesia Start: 0804 Anesthesia Stop: 0833    Procedure: COLONOSCOPY INTO CECUM (N/A ) Diagnosis:       Family history of colon cancer      (Family history of colon cancer [Z80.0])    Surgeon: Lorraine Mejia MD Provider: Sita Sheldon MD    Anesthesia Type: MAC ASA Status: 2          Anesthesia Type: MAC    Vitals  Vitals Value Taken Time   /78 03/05/21 0853   Temp     Pulse 54 03/05/21 0853   Resp 16 03/05/21 0853   SpO2 100 % 03/05/21 0853           Post Anesthesia Care and Evaluation    Patient location during evaluation: bedside  Patient participation: complete - patient participated  Level of consciousness: awake and alert  Pain management: adequate  Airway patency: patent  Anesthetic complications: No anesthetic complications  PONV Status: controlled  Cardiovascular status: acceptable  Respiratory status: acceptable  Hydration status: acceptable

## 2021-03-05 NOTE — TELEPHONE ENCOUNTER
----- Message from Lorraine Mejia MD sent at 3/5/2021  8:34 AM EST -----  Pls place in 5 year colonoscopy recall, thx

## 2021-03-19 ENCOUNTER — BULK ORDERING (OUTPATIENT)
Dept: CASE MANAGEMENT | Facility: OTHER | Age: 71
End: 2021-03-19

## 2021-03-19 DIAGNOSIS — Z23 IMMUNIZATION DUE: ICD-10-CM

## 2021-05-25 ENCOUNTER — OFFICE VISIT (OUTPATIENT)
Dept: FAMILY MEDICINE CLINIC | Facility: CLINIC | Age: 71
End: 2021-05-25

## 2021-05-25 VITALS
HEIGHT: 68 IN | DIASTOLIC BLOOD PRESSURE: 75 MMHG | RESPIRATION RATE: 16 BRPM | SYSTOLIC BLOOD PRESSURE: 128 MMHG | BODY MASS INDEX: 23.49 KG/M2 | OXYGEN SATURATION: 98 % | TEMPERATURE: 97.8 F | HEART RATE: 67 BPM | WEIGHT: 155 LBS

## 2021-05-25 DIAGNOSIS — J01.00 ACUTE NON-RECURRENT MAXILLARY SINUSITIS: Primary | ICD-10-CM

## 2021-05-25 PROCEDURE — 99213 OFFICE O/P EST LOW 20 MIN: CPT | Performed by: FAMILY MEDICINE

## 2021-05-25 RX ORDER — DOXYCYCLINE 100 MG/1
100 CAPSULE ORAL 2 TIMES DAILY
Qty: 14 CAPSULE | Refills: 0 | Status: SHIPPED | OUTPATIENT
Start: 2021-05-25 | End: 2021-06-01

## 2021-05-25 RX ORDER — BENZONATATE 100 MG/1
100 CAPSULE ORAL 3 TIMES DAILY PRN
Qty: 45 CAPSULE | Refills: 0 | Status: SHIPPED | OUTPATIENT
Start: 2021-05-25 | End: 2021-07-13

## 2021-05-25 NOTE — PROGRESS NOTES
Subjective   Melany Lloyd is a 70 y.o. female.     History of Present Illness     Melany Lloyd 70 y.o. female who presents for evaluation of sinus pressure and congestion, cough. Symptoms include ear pressure, congestion, productive cough, headache and sinus pain.  Onset of symptoms was 10 days ago, gradually worsening since that time. Patient denies shortness of breath, wheezing, fever, diarrhea.   Evaluation to date: none Treatment to date:  claritin.     The following portions of the patient's history were reviewed and updated as appropriate: allergies, current medications, past family history, past medical history, past social history, past surgical history and problem list.    Review of Systems   Constitutional: Negative for chills and fever.   HENT: Positive for congestion and sore throat. Negative for ear pain.    Respiratory: Negative for cough and shortness of breath.        Objective   Physical Exam  Constitutional:       Appearance: She is well-developed.   HENT:      Head: Normocephalic and atraumatic.      Right Ear: Hearing, ear canal and external ear normal.      Left Ear: Hearing, ear canal and external ear normal.      Nose: Mucosal edema present.      Right Sinus: Maxillary sinus tenderness present. No frontal sinus tenderness.      Left Sinus: Maxillary sinus tenderness present. No frontal sinus tenderness.      Mouth/Throat:      Pharynx: Uvula midline. Posterior oropharyngeal erythema present. No oropharyngeal exudate.      Tonsils: No tonsillar abscesses.   Eyes:      Conjunctiva/sclera: Conjunctivae normal.      Pupils: Pupils are equal, round, and reactive to light.   Cardiovascular:      Rate and Rhythm: Normal rate and regular rhythm.   Pulmonary:      Effort: Pulmonary effort is normal. No respiratory distress.      Breath sounds: Normal breath sounds. No stridor. No decreased breath sounds, wheezing, rhonchi or rales.   Musculoskeletal:         General: Normal range of motion.    Lymphadenopathy:      Cervical:      Right cervical: No superficial cervical adenopathy.     Left cervical: No superficial cervical adenopathy.   Neurological:      Mental Status: She is alert and oriented to person, place, and time.   Psychiatric:         Speech: Speech normal.                 Assessment/Plan     Diagnoses and all orders for this visit:    1. Acute non-recurrent maxillary sinusitis (Primary)  -     doxycycline (MONODOX) 100 MG capsule; Take 1 capsule by mouth 2 (Two) Times a Day for 7 days.  Dispense: 14 capsule; Refill: 0  -     benzonatate (Tessalon Perles) 100 MG capsule; Take 1 capsule by mouth 3 (Three) Times a Day As Needed for Cough.  Dispense: 45 capsule; Refill: 0      Rest as needed  Call not better in 7 days  Increase fluids  Call if worse  Can use generic Afrin nasal spray up to 5 days for nasal congestion  Finish antibiotic course of therapy

## 2021-05-25 NOTE — PATIENT INSTRUCTIONS
Sinusitis, Adult  Sinusitis is soreness and swelling (inflammation) of your sinuses. Sinuses are hollow spaces in the bones around your face. They are located:  · Around your eyes.  · In the middle of your forehead.  · Behind your nose.  · In your cheekbones.  Your sinuses and nasal passages are lined with a fluid called mucus. Mucus drains out of your sinuses. Swelling can trap mucus in your sinuses. This lets germs (bacteria, virus, or fungus) grow, which leads to infection. Most of the time, this condition is caused by a virus.  What are the causes?  This condition is caused by:  · Allergies.  · Asthma.  · Germs.  · Things that block your nose or sinuses.  · Growths in the nose (nasal polyps).  · Chemicals or irritants in the air.  · Fungus (rare).  What increases the risk?  You are more likely to develop this condition if:  · You have a weak body defense system (immune system).  · You do a lot of swimming or diving.  · You use nasal sprays too much.  · You smoke.  What are the signs or symptoms?  The main symptoms of this condition are pain and a feeling of pressure around the sinuses. Other symptoms include:  · Stuffy nose (congestion).  · Runny nose (drainage).  · Swelling and warmth in the sinuses.  · Headache.  · Toothache.  · A cough that may get worse at night.  · Mucus that collects in the throat or the back of the nose (postnasal drip).  · Being unable to smell and taste.  · Being very tired (fatigue).  · A fever.  · Sore throat.  · Bad breath.  How is this diagnosed?  This condition is diagnosed based on:  · Your symptoms.  · Your medical history.  · A physical exam.  · Tests to find out if your condition is short-term (acute) or long-term (chronic). Your doctor may:  ? Check your nose for growths (polyps).  ? Check your sinuses using a tool that has a light (endoscope).  ? Check for allergies or germs.  ? Do imaging tests, such as an MRI or CT scan.  How is this treated?  Treatment for this condition  depends on the cause and whether it is short-term or long-term.  · If caused by a virus, your symptoms should go away on their own within 10 days. You may be given medicines to relieve symptoms. They include:  ? Medicines that shrink swollen tissue in the nose.  ? Medicines that treat allergies (antihistamines).  ? A spray that treats swelling of the nostrils.   ? Rinses that help get rid of thick mucus in your nose (nasal saline washes).  · If caused by bacteria, your doctor may wait to see if you will get better without treatment. You may be given antibiotic medicine if you have:  ? A very bad infection.  ? A weak body defense system.  · If caused by growths in the nose, you may need to have surgery.  Follow these instructions at home:  Medicines  · Take, use, or apply over-the-counter and prescription medicines only as told by your doctor. These may include nasal sprays.  · If you were prescribed an antibiotic medicine, take it as told by your doctor. Do not stop taking the antibiotic even if you start to feel better.  Hydrate and humidify    · Drink enough water to keep your pee (urine) pale yellow.  · Use a cool mist humidifier to keep the humidity level in your home above 50%.  · Breathe in steam for 10-15 minutes, 3-4 times a day, or as told by your doctor. You can do this in the bathroom while a hot shower is running.  · Try not to spend time in cool or dry air.  Rest  · Rest as much as you can.  · Sleep with your head raised (elevated).  · Make sure you get enough sleep each night.  General instructions    · Put a warm, moist washcloth on your face 3-4 times a day, or as often as told by your doctor. This will help with discomfort.  · Wash your hands often with soap and water. If there is no soap and water, use hand .  · Do not smoke. Avoid being around people who are smoking (secondhand smoke).  · Keep all follow-up visits as told by your doctor. This is important.  Contact a doctor if:  · You  have a fever.  · Your symptoms get worse.  · Your symptoms do not get better within 10 days.  Get help right away if:  · You have a very bad headache.  · You cannot stop throwing up (vomiting).  · You have very bad pain or swelling around your face or eyes.  · You have trouble seeing.  · You feel confused.  · Your neck is stiff.  · You have trouble breathing.  Summary  · Sinusitis is swelling of your sinuses. Sinuses are hollow spaces in the bones around your face.  · This condition is caused by tissues in your nose that become inflamed or swollen. This traps germs. These can lead to infection.  · If you were prescribed an antibiotic medicine, take it as told by your doctor. Do not stop taking it even if you start to feel better.  · Keep all follow-up visits as told by your doctor. This is important.  This information is not intended to replace advice given to you by your health care provider. Make sure you discuss any questions you have with your health care provider.  Document Revised: 05/20/2019 Document Reviewed: 05/20/2019  [employer] Patient Education © 2021 [employer]

## 2021-06-16 ENCOUNTER — OFFICE VISIT (OUTPATIENT)
Dept: GASTROENTEROLOGY | Facility: CLINIC | Age: 71
End: 2021-06-16

## 2021-06-16 VITALS
HEIGHT: 68 IN | BODY MASS INDEX: 23.19 KG/M2 | WEIGHT: 153 LBS | SYSTOLIC BLOOD PRESSURE: 122 MMHG | DIASTOLIC BLOOD PRESSURE: 75 MMHG

## 2021-06-16 DIAGNOSIS — R74.8 ELEVATED LIVER ENZYMES: Primary | Chronic | ICD-10-CM

## 2021-06-16 DIAGNOSIS — M32.9 SYSTEMIC LUPUS ERYTHEMATOSUS, UNSPECIFIED SLE TYPE, UNSPECIFIED ORGAN INVOLVEMENT STATUS (HCC): Chronic | ICD-10-CM

## 2021-06-16 DIAGNOSIS — R61 NIGHT SWEATS: ICD-10-CM

## 2021-06-16 DIAGNOSIS — Z80.0 FAMILY HISTORY OF COLON CANCER: ICD-10-CM

## 2021-06-16 DIAGNOSIS — Z79.1 NSAID LONG-TERM USE: Chronic | ICD-10-CM

## 2021-06-16 PROCEDURE — 99214 OFFICE O/P EST MOD 30 MIN: CPT | Performed by: INTERNAL MEDICINE

## 2021-06-16 NOTE — PROGRESS NOTES
Chief Complaint   Patient presents with   • Elevated Hepatic Enzymes       Subjective     HPI    Melany Lloyd is a 70 y.o. female with a past medical history noted below who presents for follow-up of elevated liver enzymes, family history of colon cancer.  She additionally has a history of lupus and is followed by rheumatology.    She completed surveillance colonoscopy in March of this year that was normal except for diverticulosis and internal hemorrhoids.    She saw Dr Espinosa in March who doesn't think her lupus is causing liver issues.  She reports that her lupus is quiescent.  She is not on any medication for this.    She did some googling and has been on milk thistle.      She is also concerned about night sweats that have been present for 2 years.  Dr Jack is aware and had sent her to gynecology.  Pt thinks the milk thistle is helping.      Drinking ETOH about 1 drink every 2 weeks.      Takes fish oil, flaxseed oil, fiber, b vitamins, 1 alleve per day which she takes for arthritis.    Today's visit was in the office.  Both the patient and I were wearing face masks and proper hand hygiene was performed before and after the physical exam.           Current Outpatient Medications:   •  Cholecalciferol (VITAMIN D) 2000 UNITS capsule, Take 1,000 Units by mouth Daily., Disp: , Rfl:   •  Flaxseed, Linseed, (FLAX SEED OIL PO), Take 2 capsules by mouth daily., Disp: , Rfl:   •  FOLIC ACID PO, Take  by mouth., Disp: , Rfl:   •  MILK THISTLE PO, Take  by mouth., Disp: , Rfl:   •  Naproxen Sodium 220 MG capsule, take 1 po daily as needed, Disp: , Rfl:   •  NON FORMULARY, Pain cream, Disp: , Rfl:   •  Omega-3 Fatty Acids (FISH OIL PO), Take 1 tablet by mouth Daily., Disp: , Rfl:   •  Probiotic Product (PROBIOTIC ADVANCED PO), take 1 po daily, Disp: , Rfl:   •  vitamin B-12 (CYANOCOBALAMIN) 1000 MCG tablet, Take 1 tablet by mouth., Disp: , Rfl:   •  vitamin B-6 (PYRIDOXINE) 50 MG tablet, Take 50 mg by mouth Daily., Disp:  , Rfl:   •  benzonatate (Tessalon Perles) 100 MG capsule, Take 1 capsule by mouth 3 (Three) Times a Day As Needed for Cough., Disp: 45 capsule, Rfl: 0      Objective     Vitals:    06/16/21 1403   BP: 122/75         06/16/21  1403   Weight: 69.4 kg (153 lb)     Body mass index is 23.26 kg/m².    Physical Exam  Constitutional:       Appearance: Normal appearance.   Pulmonary:      Effort: Pulmonary effort is normal.   Neurological:      Mental Status: She is alert and oriented to person, place, and time.   Psychiatric:         Mood and Affect: Mood normal.         Behavior: Behavior normal.             WBC   Date Value Ref Range Status   06/30/2020 4.83 3.40 - 10.80 10*3/mm3 Final     RBC   Date Value Ref Range Status   06/30/2020 4.15 3.77 - 5.28 10*6/mm3 Final     Hemoglobin   Date Value Ref Range Status   06/30/2020 13.0 12.0 - 15.9 g/dL Final   01/05/2018 13.5 11.9 - 15.5 g/dL Final     Hematocrit   Date Value Ref Range Status   06/30/2020 38.0 34.0 - 46.6 % Final   01/05/2018 40.0 35.6 - 45.5 % Final     MCV   Date Value Ref Range Status   06/30/2020 91.6 79.0 - 97.0 fL Final   01/05/2018 92.4 80.5 - 98.2 fL Final     MCH   Date Value Ref Range Status   06/30/2020 31.3 26.6 - 33.0 pg Final   01/05/2018 31.2 26.9 - 32.0 pg Final     MCHC   Date Value Ref Range Status   06/30/2020 34.2 31.5 - 35.7 g/dL Final   01/05/2018 33.8 32.4 - 36.3 g/dL Final     RDW   Date Value Ref Range Status   06/30/2020 12.7 12.3 - 15.4 % Final   01/05/2018 13.6 (H) 11.7 - 13.0 % Final     RDW-SD   Date Value Ref Range Status   01/05/2018 46.9 37.0 - 54.0 fl Final     MPV   Date Value Ref Range Status   01/05/2018 9.1 6.0 - 12.0 fL Final     Platelets   Date Value Ref Range Status   06/30/2020 321 140 - 450 10*3/mm3 Final   01/05/2018 292 140 - 500 10*3/mm3 Final     Neutrophil Rel %   Date Value Ref Range Status   06/30/2020 56.7 42.7 - 76.0 % Final     Lymphocyte Rel %   Date Value Ref Range Status   06/30/2020 28.4 19.6 - 45.3 %  Final     Monocyte Rel %   Date Value Ref Range Status   06/30/2020 10.6 5.0 - 12.0 % Final     Eosinophil Rel %   Date Value Ref Range Status   06/30/2020 2.7 0.3 - 6.2 % Final     Basophil Rel %   Date Value Ref Range Status   06/30/2020 1.4 0.0 - 1.5 % Final     Neutrophils Absolute   Date Value Ref Range Status   06/30/2020 2.74 1.70 - 7.00 10*3/mm3 Final     Lymphocytes Absolute   Date Value Ref Range Status   06/30/2020 1.37 0.70 - 3.10 10*3/mm3 Final     Monocytes Absolute   Date Value Ref Range Status   06/30/2020 0.51 0.10 - 0.90 10*3/mm3 Final     Eosinophils Absolute   Date Value Ref Range Status   06/30/2020 0.13 0.00 - 0.40 10*3/mm3 Final     Basophils Absolute   Date Value Ref Range Status   06/30/2020 0.07 0.00 - 0.20 10*3/mm3 Final     nRBC   Date Value Ref Range Status   06/30/2020 0.0 0.0 - 0.2 /100 WBC Final       Lab Results   Component Value Date    GLUCOSE 88 01/05/2018    BUN 21 02/08/2021    CREATININE 0.62 02/08/2021    EGFRIFNONA 96 02/08/2021    EGFRIFAFRI 116 02/08/2021    BCR 33.9 (H) 02/08/2021    CO2 31.0 (H) 02/08/2021    CALCIUM 9.5 02/08/2021    PROTENTOTREF 6.8 02/08/2021    ALBUMIN 4.40 02/08/2021    LABIL2 1.8 02/08/2021    AST 55 (H) 02/08/2021    ALT 45 (H) 02/08/2021         Imaging Results (Last 7 Days)     ** No results found for the last 168 hours. **          I personally reviewed data as detailed below:     The labs listed above.    Office notes from: 3/17/21 rheumatology note    Endoscopy procedures/notes from: 3/5/21      No notes on file    Assessment/Plan    1.  Elevated liver enzymes: Chronic, persistent issue.  Work-up so far has been unrevealing.  Additionally GGT has been low and CK has been in normal limits.?  Related to naproxen as this would be the only culprit medication.    2.  NSAID use: Chronic issue.  She takes this for arthritis.  Aleve once daily    3.  SLE: Stable disease, not on any medications    4.  Reports of night sweats: She has discussed this  with her PCP, thought to be menopausal related    5.  Family history of colon cancer: She is up-to-date on screening    Plan  Discussed our options today.  She is very much wanting to get to the bottom of this issue.  We will update her CMP today along with CBC, GGT  If her CMP is still elevated, will trial her off of naproxen for 2 weeks and then check a CMP after that period of time.  That may give us the answer we need  We discussed a liver biopsy today but given the minimal elevation that she has an normal imaging that she has had previously I am a little hesitant to pursue that  Further recommendations after labs  Recommend bringing up her night sweat issue again with  whom she sees next month.    Diagnoses and all orders for this visit:    1. Elevated liver enzymes (Primary)  -     Comprehensive Metabolic Panel  -     Gamma GT  -     CBC & Differential    2. Family history of colon cancer  -     CBC & Differential    3. Systemic lupus erythematosus, unspecified SLE type, unspecified organ involvement status (CMS/HCC)  -     CBC & Differential    4. Night sweats    5. NSAID long-term use        I have discussed the above plan with the patient.  They verbalize understanding and are in agreement with the plan.  They have been advised to contact the office for any questions, concerns, or changes related to their health.    Dictated utilizing Dragon dictation

## 2021-06-17 DIAGNOSIS — R74.8 ELEVATED LIVER ENZYMES: Primary | ICD-10-CM

## 2021-06-17 LAB
ALBUMIN SERPL-MCNC: 4.6 G/DL (ref 3.5–5.2)
ALBUMIN/GLOB SERPL: 2 G/DL
ALP SERPL-CCNC: 44 U/L (ref 39–117)
ALT SERPL-CCNC: 35 U/L (ref 1–33)
AST SERPL-CCNC: 59 U/L (ref 1–32)
BASOPHILS # BLD AUTO: 0.05 10*3/MM3 (ref 0–0.2)
BASOPHILS NFR BLD AUTO: 1 % (ref 0–1.5)
BILIRUB SERPL-MCNC: 0.3 MG/DL (ref 0–1.2)
BUN SERPL-MCNC: 21 MG/DL (ref 8–23)
BUN/CREAT SERPL: 30.4 (ref 7–25)
CALCIUM SERPL-MCNC: 9.6 MG/DL (ref 8.6–10.5)
CHLORIDE SERPL-SCNC: 102 MMOL/L (ref 98–107)
CO2 SERPL-SCNC: 32 MMOL/L (ref 22–29)
CREAT SERPL-MCNC: 0.69 MG/DL (ref 0.57–1)
EOSINOPHIL # BLD AUTO: 0.21 10*3/MM3 (ref 0–0.4)
EOSINOPHIL NFR BLD AUTO: 4.1 % (ref 0.3–6.2)
ERYTHROCYTE [DISTWIDTH] IN BLOOD BY AUTOMATED COUNT: 12.7 % (ref 12.3–15.4)
GGT SERPL-CCNC: 15 U/L (ref 5–36)
GLOBULIN SER CALC-MCNC: 2.3 GM/DL
GLUCOSE SERPL-MCNC: 90 MG/DL (ref 65–99)
HCT VFR BLD AUTO: 40.3 % (ref 34–46.6)
HGB BLD-MCNC: 13.3 G/DL (ref 12–15.9)
IMM GRANULOCYTES # BLD AUTO: 0.01 10*3/MM3 (ref 0–0.05)
IMM GRANULOCYTES NFR BLD AUTO: 0.2 % (ref 0–0.5)
LYMPHOCYTES # BLD AUTO: 1.72 10*3/MM3 (ref 0.7–3.1)
LYMPHOCYTES NFR BLD AUTO: 33.4 % (ref 19.6–45.3)
MCH RBC QN AUTO: 31.1 PG (ref 26.6–33)
MCHC RBC AUTO-ENTMCNC: 33 G/DL (ref 31.5–35.7)
MCV RBC AUTO: 94.4 FL (ref 79–97)
MONOCYTES # BLD AUTO: 0.6 10*3/MM3 (ref 0.1–0.9)
MONOCYTES NFR BLD AUTO: 11.7 % (ref 5–12)
NEUTROPHILS # BLD AUTO: 2.56 10*3/MM3 (ref 1.7–7)
NEUTROPHILS NFR BLD AUTO: 49.6 % (ref 42.7–76)
NRBC BLD AUTO-RTO: 0 /100 WBC (ref 0–0.2)
PLATELET # BLD AUTO: 354 10*3/MM3 (ref 140–450)
POTASSIUM SERPL-SCNC: 5.3 MMOL/L (ref 3.5–5.2)
PROT SERPL-MCNC: 6.9 G/DL (ref 6–8.5)
RBC # BLD AUTO: 4.27 10*6/MM3 (ref 3.77–5.28)
SODIUM SERPL-SCNC: 140 MMOL/L (ref 136–145)
WBC # BLD AUTO: 5.15 10*3/MM3 (ref 3.4–10.8)

## 2021-06-17 NOTE — PROGRESS NOTES
Her AST and ALT are still elevated.I have put in another CMP.  She should have this drawn approximately 2 weeks after stopping Aleve as per our discussion.

## 2021-06-18 ENCOUNTER — TELEPHONE (OUTPATIENT)
Dept: GASTROENTEROLOGY | Facility: CLINIC | Age: 71
End: 2021-06-18

## 2021-06-18 NOTE — TELEPHONE ENCOUNTER
----- Message from Lorraine Mejia MD sent at 6/17/2021  3:06 PM EDT -----  Her AST and ALT are still elevated.I have put in another CMP.  She should have this drawn approximately 2 weeks after stopping Aleve as per our discussion.

## 2021-06-18 NOTE — TELEPHONE ENCOUNTER
Call to pt.  Advise per DR Mejia note.  Verb understanding.      States will stop aleve 6/25.  Will call back to make lab appt (currently driving).

## 2021-07-09 ENCOUNTER — LAB (OUTPATIENT)
Dept: GASTROENTEROLOGY | Facility: CLINIC | Age: 71
End: 2021-07-09

## 2021-07-13 ENCOUNTER — OFFICE VISIT (OUTPATIENT)
Dept: FAMILY MEDICINE CLINIC | Facility: CLINIC | Age: 71
End: 2021-07-13

## 2021-07-13 VITALS
BODY MASS INDEX: 23.04 KG/M2 | RESPIRATION RATE: 16 BRPM | DIASTOLIC BLOOD PRESSURE: 72 MMHG | HEART RATE: 69 BPM | WEIGHT: 152 LBS | SYSTOLIC BLOOD PRESSURE: 112 MMHG | OXYGEN SATURATION: 95 % | HEIGHT: 68 IN | TEMPERATURE: 97.8 F

## 2021-07-13 DIAGNOSIS — E55.9 VITAMIN D DEFICIENCY: ICD-10-CM

## 2021-07-13 DIAGNOSIS — Z00.00 MEDICARE ANNUAL WELLNESS VISIT, SUBSEQUENT: Primary | ICD-10-CM

## 2021-07-13 DIAGNOSIS — M32.9 SYSTEMIC LUPUS ERYTHEMATOSUS, UNSPECIFIED SLE TYPE, UNSPECIFIED ORGAN INVOLVEMENT STATUS (HCC): ICD-10-CM

## 2021-07-13 DIAGNOSIS — N39.41 URGE INCONTINENCE OF URINE: ICD-10-CM

## 2021-07-13 DIAGNOSIS — Z12.31 ENCOUNTER FOR SCREENING MAMMOGRAM FOR BREAST CANCER: ICD-10-CM

## 2021-07-13 DIAGNOSIS — M85.80 OSTEOPENIA, UNSPECIFIED LOCATION: ICD-10-CM

## 2021-07-13 DIAGNOSIS — R20.2 NUMBNESS AND TINGLING OF BOTH FEET: ICD-10-CM

## 2021-07-13 DIAGNOSIS — R20.0 NUMBNESS AND TINGLING OF BOTH FEET: ICD-10-CM

## 2021-07-13 LAB
25(OH)D3+25(OH)D2 SERPL-MCNC: 34.4 NG/ML (ref 30–100)
BASOPHILS # BLD AUTO: 0.06 10*3/MM3 (ref 0–0.2)
BASOPHILS NFR BLD AUTO: 1.3 % (ref 0–1.5)
CHOLEST SERPL-MCNC: 250 MG/DL (ref 0–200)
EOSINOPHIL # BLD AUTO: 0.12 10*3/MM3 (ref 0–0.4)
EOSINOPHIL NFR BLD AUTO: 2.7 % (ref 0.3–6.2)
ERYTHROCYTE [DISTWIDTH] IN BLOOD BY AUTOMATED COUNT: 12.3 % (ref 12.3–15.4)
HCT VFR BLD AUTO: 41.5 % (ref 34–46.6)
HDLC SERPL-MCNC: 100 MG/DL (ref 40–60)
HGB BLD-MCNC: 14.2 G/DL (ref 12–15.9)
IMM GRANULOCYTES # BLD AUTO: 0.01 10*3/MM3 (ref 0–0.05)
IMM GRANULOCYTES NFR BLD AUTO: 0.2 % (ref 0–0.5)
LDLC SERPL CALC-MCNC: 142 MG/DL (ref 0–100)
LYMPHOCYTES # BLD AUTO: 1.36 10*3/MM3 (ref 0.7–3.1)
LYMPHOCYTES NFR BLD AUTO: 30.5 % (ref 19.6–45.3)
MCH RBC QN AUTO: 31.5 PG (ref 26.6–33)
MCHC RBC AUTO-ENTMCNC: 34.2 G/DL (ref 31.5–35.7)
MCV RBC AUTO: 92 FL (ref 79–97)
MONOCYTES # BLD AUTO: 0.47 10*3/MM3 (ref 0.1–0.9)
MONOCYTES NFR BLD AUTO: 10.5 % (ref 5–12)
NEUTROPHILS # BLD AUTO: 2.44 10*3/MM3 (ref 1.7–7)
NEUTROPHILS NFR BLD AUTO: 54.8 % (ref 42.7–76)
NRBC BLD AUTO-RTO: 0 /100 WBC (ref 0–0.2)
PLATELET # BLD AUTO: 361 10*3/MM3 (ref 140–450)
RBC # BLD AUTO: 4.51 10*6/MM3 (ref 3.77–5.28)
TRIGL SERPL-MCNC: 52 MG/DL (ref 0–150)
VIT B12 SERPL-MCNC: 1424 PG/ML (ref 211–946)
VLDLC SERPL CALC-MCNC: 8 MG/DL (ref 5–40)
WBC # BLD AUTO: 4.46 10*3/MM3 (ref 3.4–10.8)

## 2021-07-13 PROCEDURE — G0439 PPPS, SUBSEQ VISIT: HCPCS | Performed by: FAMILY MEDICINE

## 2021-07-13 NOTE — PROGRESS NOTES
The ABCs of the Annual Wellness Visit  Subsequent Medicare Wellness Visit    Chief Complaint   Patient presents with   • Medicare Wellness-subsequent       Subjective   History of Present Illness:  Melany Lloyd is a 70 y.o. female who presents for a Subsequent Medicare Wellness Visit.    Systemic lupus: Last visit March 2021.  Still keeping her off of Plaquenil; she is doing well. Per records Lupus stable despite being off Plaquenil.  They also follow her for sicca syndrome secondary to Sjogren's; considering trial of Prolia in the future for osteoporosis if repeat DEXA scan worsens.     History of solitary pulmonary nodule: CT chest February 2019 showed stable right upper lobe nodule as well as stable splenic artery aneurysm.  Was due for follow-up CT chest in 6 to 9 months; as discussed in June this order was placed however had not been done yet.  This was then done in July 2020.  No acute abnormalities nodule pulmonary stable since 2017 can be considered benign.     Urge incontinence: Considering medication.  Gynecologist tried Imvexxy for hot flashes and incontinence however patient had irritation and bleeding and therefore discontinued.    At last visit was amenable to referral to uro-Gyn.  Has some testing coming up; likely going to do surgery with uro-Gyn.    SOB x 5 years; first worked up with Cardio per patient 5 years ago; stress test and other tests negative. Episodes 5-10 seconds; with rest or exertion; varied situations; more frequent this summer. No associated wheezing.     Mammogram: Due November 2021 order placed today  Colon cancer screening: Up-to-date  DEXA scan: Up-to-date  Tdap: Up-to-date    HEALTH RISK ASSESSMENT    Recent Hospitalizations:  No hospitalization(s) within the last year.    Current Medical Providers:  Patient Care Team:  Alma Jack MD as PCP - General (Family Medicine)  Luz Maria Espinosa MD as Consulting Physician (Rheumatology)  Luz Maria Espinosa MD as Consulting Physician  (Rheumatology)  Christian Sanders MD as Surgeon (Orthopedic Surgery)  Page Bro MD as Consulting Physician (Ophthalmology)    Smoking Status:  Social History     Tobacco Use   Smoking Status Never Smoker   Smokeless Tobacco Never Used       Alcohol Consumption:  Social History     Substance and Sexual Activity   Alcohol Use Yes    Comment: Occasional.        Depression Screen:   PHQ-2/PHQ-9 Depression Screening 7/13/2021   Little interest or pleasure in doing things 0   Feeling down, depressed, or hopeless 0   Trouble falling or staying asleep, or sleeping too much -   Feeling tired or having little energy -   Poor appetite or overeating -   Feeling bad about yourself - or that you are a failure or have let yourself or your family down -   Trouble concentrating on things, such as reading the newspaper or watching television -   Moving or speaking so slowly that other people could have noticed. Or the opposite - being so fidgety or restless that you have been moving around a lot more than usual -   Thoughts that you would be better off dead, or of hurting yourself in some way -   Total Score 0   If you checked off any problems, how difficult have these problems made it for you to do your work, take care of things at home, or get along with other people? -       Fall Risk Screen:  STEADI Fall Risk Assessment was completed, and patient is at LOW risk for falls.Assessment completed on:7/13/2021    Health Habits and Functional and Cognitive Screening:  Functional & Cognitive Status 6/9/2020   Do you have difficulty preparing food and eating? No   Do you have difficulty bathing yourself, getting dressed or grooming yourself? No   Do you have difficulty using the toilet? No   Do you have difficulty moving around from place to place? No   Do you have trouble with steps or getting out of a bed or a chair? No   Current Diet Well Balanced Diet   Dental Exam Not up to date   Eye Exam Up to date   Exercise (times per  week) 3 times per week   Current Exercise Activities Include Walking   Do you need help using the phone?  No   Are you deaf or do you have serious difficulty hearing?  Yes   Do you need help with transportation? No   Do you need help shopping? No   Do you need help preparing meals?  No   Do you need help with housework?  No   Do you need help with laundry? No   Do you need help taking your medications? No   Do you need help managing money? No   Do you ever drive or ride in a car without wearing a seat belt? Yes   Have you felt unusual stress, anger or loneliness in the last month? -   Who do you live with? -   If you need help, do you have trouble finding someone available to you? -   Have you been bothered in the last four weeks by sexual problems? -   Do you have difficulty concentrating, remembering or making decisions? -         Does the patient have evidence of cognitive impairment? No    Asprin use counseling:Does not need ASA (and currently is not on it)    Age-appropriate Screening Schedule:  Refer to the list below for future screening recommendations based on patient's age, sex and/or medical conditions. Orders for these recommended tests are listed in the plan section. The patient has been provided with a written plan.    Health Maintenance   Topic Date Due   • INFLUENZA VACCINE  08/01/2021   • DXA SCAN  07/20/2022   • MAMMOGRAM  11/09/2022   • TDAP/TD VACCINES (3 - Td or Tdap) 02/04/2026   • ZOSTER VACCINE  Completed   • PAP SMEAR  Discontinued          The following portions of the patient's history were reviewed and updated as appropriate: allergies, current medications, past family history, past medical history, past social history, past surgical history and problem list.    Outpatient Medications Prior to Visit   Medication Sig Dispense Refill   • Cholecalciferol (VITAMIN D) 2000 UNITS capsule Take 1,000 Units by mouth Daily.     • Flaxseed, Linseed, (FLAX SEED OIL PO) Take 2 capsules by mouth daily.      • FOLIC ACID PO Take  by mouth.     • MILK THISTLE PO Take  by mouth.     • Naproxen Sodium 220 MG capsule take 1 po daily as needed     • NON FORMULARY Pain cream     • Omega-3 Fatty Acids (FISH OIL PO) Take 1 tablet by mouth Daily.     • Probiotic Product (PROBIOTIC ADVANCED PO) take 1 po daily     • vitamin B-12 (CYANOCOBALAMIN) 1000 MCG tablet Take 1 tablet by mouth.     • vitamin B-6 (PYRIDOXINE) 50 MG tablet Take 50 mg by mouth Daily.     • benzonatate (Tessalon Perles) 100 MG capsule Take 1 capsule by mouth 3 (Three) Times a Day As Needed for Cough. 45 capsule 0     No facility-administered medications prior to visit.       Patient Active Problem List   Diagnosis   • GERD (gastroesophageal reflux disease)   • Systemic lupus erythematosus (CMS/HCC)   • DDD (degenerative disc disease), lumbosacral   • Fibromyalgia   • Familial tremor   • Abnormal MRA, brain   • Cervical disc disorder at C5-C6 level with radiculopathy   • Aneurysm of other specified arteries (CMS/HCC)   • Solitary pulmonary nodule   • Vitamin D deficiency   • Mild episode of depression (CMS/HCC)   • Elevated liver enzymes   • Body mass index (bmi) 25.0-25.9, adult   • Osteoarthritis of both feet   • Vaginal atrophy   • Female dyspareunia   • OAB (overactive bladder)   • Hot flashes   • Family history of colon cancer       Advanced Care Planning:  ACP discussion was held with the patient during this visit. Patient does not have an advance directive, information provided.    Review of Systems   Constitutional: Negative for fever.   HENT: Negative for nosebleeds and trouble swallowing.    Eyes: Negative for visual disturbance.   Respiratory: Negative for choking and stridor.    Cardiovascular: Negative for chest pain.   Gastrointestinal: Negative for blood in stool.   Endocrine: Negative for polydipsia.   Genitourinary: Negative for genital sores and hematuria.   Musculoskeletal: Negative for joint swelling.   Skin: Negative for color change  "and rash.   Allergic/Immunologic: Negative for immunocompromised state.   Neurological: Negative for seizures, facial asymmetry and speech difficulty.   Hematological: Negative for adenopathy.   Psychiatric/Behavioral: Negative for behavioral problems, self-injury and suicidal ideas.       Compared to one year ago, the patient feels her physical health is the same.  Compared to one year ago, the patient feels her mental health is the same.    Reviewed chart for potential of high risk medication in the elderly: yes  Reviewed chart for potential of harmful drug interactions in the elderly:yes    Objective         Vitals:    07/13/21 1001   BP: 112/72   Pulse: 69   Resp: 16   Temp: 97.8 °F (36.6 °C)   TempSrc: Tympanic   SpO2: 95%   Weight: 68.9 kg (152 lb)   Height: 172.7 cm (68\")   PainSc: 0-No pain       Body mass index is 23.11 kg/m².  Discussed the patient's BMI with her. The BMI is in the acceptable range.    Physical Exam  Constitutional:       Appearance: She is well-developed.   HENT:      Head: Normocephalic and atraumatic.      Right Ear: External ear normal.      Left Ear: External ear normal.      Nose: Nose normal.   Eyes:      Conjunctiva/sclera: Conjunctivae normal.      Pupils: Pupils are equal, round, and reactive to light.   Cardiovascular:      Rate and Rhythm: Normal rate and regular rhythm.   Pulmonary:      Effort: Pulmonary effort is normal. No respiratory distress.      Breath sounds: Normal breath sounds. No stridor.   Abdominal:      General: Bowel sounds are normal. There is no distension.      Palpations: Abdomen is soft.      Tenderness: There is no abdominal tenderness.   Musculoskeletal:      Cervical back: Normal range of motion.   Skin:     General: Skin is warm.   Neurological:      Mental Status: She is alert and oriented to person, place, and time.   Psychiatric:         Behavior: Behavior normal.         Lab Results   Component Value Date     (H) 07/09/2021    "     Assessment/Plan   Medicare Risks and Personalized Health Plan  CMS Preventative Services Quick Reference  Advance Directive Discussion  Breast Cancer/Mammogram Screening  Cardiovascular risk  Urinary Incontinence    The above risks/problems have been discussed with the patient.  Pertinent information has been shared with the patient in the After Visit Summary.  Follow up plans and orders are seen below in the Assessment/Plan Section.    Diagnoses and all orders for this visit:    1. Medicare annual wellness visit, subsequent (Primary)  -     Lipid Panel    2. Urge incontinence of urine    3. Osteopenia, unspecified location  -     Vitamin D 25 Hydroxy    4. Systemic lupus erythematosus, unspecified SLE type, unspecified organ involvement status (CMS/[facility])    5. Numbness and tingling of both feet  -     CBC & Differential  -     Vitamin B12    6. Vitamin D deficiency  -     Vitamin D 25 Hydroxy    7. Encounter for screening mammogram for breast cancer  -     Mammo Screening Bilateral With CAD; Future      Follow Up:  No follow-ups on file.     An After Visit Summary and PPPS were given to the patient.

## 2021-07-14 NOTE — PROGRESS NOTES
Elevations in total cholesterol and LDL, vitamin D normal, B12 elevated no need for supplements.  CBC normal.  Work on diet and exercise to improve cholesterol panel.  Recheck in 3 months.  Can refer to neurology for numbness or tingling in feet.

## 2021-07-16 DIAGNOSIS — R74.8 ELEVATED LIVER ENZYMES: Primary | ICD-10-CM

## 2021-07-19 ENCOUNTER — TELEPHONE (OUTPATIENT)
Dept: GASTROENTEROLOGY | Facility: CLINIC | Age: 71
End: 2021-07-19

## 2021-07-19 NOTE — TELEPHONE ENCOUNTER
----- Message from Lorraine Mejia MD sent at 7/16/2021  4:10 PM EDT -----  Her liver enzymes are remaining slightly elevated.  I think at this point, we are going to need to consider a liver biopsy.  I will go ahead and place order but if this is not something she wants to do at this time, she can hold on scheduling.

## 2021-07-19 NOTE — TELEPHONE ENCOUNTER
Call to spouse, Billy (see hipaa).  Advise per Dr Mejia note.  Verb understanding.  States pt will proceed with bx.      It is noted that CT guided liver bx scheduled for 8/16.

## 2021-07-29 ENCOUNTER — OFFICE VISIT (OUTPATIENT)
Dept: FAMILY MEDICINE CLINIC | Facility: CLINIC | Age: 71
End: 2021-07-29

## 2021-07-29 VITALS
BODY MASS INDEX: 23.04 KG/M2 | HEIGHT: 68 IN | WEIGHT: 152 LBS | DIASTOLIC BLOOD PRESSURE: 60 MMHG | TEMPERATURE: 97.5 F | RESPIRATION RATE: 16 BRPM | SYSTOLIC BLOOD PRESSURE: 124 MMHG | HEART RATE: 76 BPM

## 2021-07-29 DIAGNOSIS — M19.90 OSTEOARTHRITIS, UNSPECIFIED OSTEOARTHRITIS TYPE, UNSPECIFIED SITE: Primary | Chronic | ICD-10-CM

## 2021-07-29 PROCEDURE — 99212 OFFICE O/P EST SF 10 MIN: CPT | Performed by: FAMILY MEDICINE

## 2021-07-29 NOTE — PROGRESS NOTES
"Erwin Lloyd is a 70 y.o. female.     CC: Management of OA    History of Present Illness     Pt comes in today to discuss options for treating her OA, as she is going for a Liver bx in August and was stopped on her Naproxen. sHE HAS BEEN HURTING QUITE A BIT SINCE.     The following portions of the patient's history were reviewed and updated as appropriate: allergies, current medications, past family history, past medical history, past social history, past surgical history and problem list.    Review of Systems   Constitutional: Negative for activity change, chills and fever.   Respiratory: Negative for cough.    Cardiovascular: Negative for chest pain.   Musculoskeletal: Positive for arthralgias.   Psychiatric/Behavioral: Negative for dysphoric mood.       /60   Pulse 76   Temp 97.5 °F (36.4 °C) (Oral)   Resp 16   Ht 172.7 cm (68\")   Wt 68.9 kg (152 lb)   LMP  (LMP Unknown)   BMI 23.11 kg/m²     Objective   Physical Exam  Constitutional:       General: She is not in acute distress.     Appearance: She is well-developed.   Pulmonary:      Effort: Pulmonary effort is normal.   Neurological:      Mental Status: She is alert and oriented to person, place, and time.   Psychiatric:         Behavior: Behavior normal.         Thought Content: Thought content normal.         Assessment/Plan   Diagnoses and all orders for this visit:    1. Osteoarthritis, unspecified osteoarthritis type, unspecified site (Primary)    Pt does exceptional with the OTC Naproxen and is going to restart this, holding 5 days prior to her procedure.             "

## 2021-08-09 NOTE — PROGRESS NOTES
08/16/21 0000   Pre-Procedure Phone Call   Procedure Time Verified Yes   Arrival Time 1100   Procedure Location Verified Yes   Medical History Reviewed No   NPO Status Reinforced Yes   Ride and Caregiver Arranged Yes   Patient Knows to Bring Current Medications No   Bring Outside Films Requested No

## 2021-08-16 ENCOUNTER — HOSPITAL ENCOUNTER (OUTPATIENT)
Dept: CT IMAGING | Facility: HOSPITAL | Age: 71
Discharge: HOME | End: 2021-08-16
Admitting: RADIOLOGY

## 2021-08-16 VITALS
BODY MASS INDEX: 23.04 KG/M2 | TEMPERATURE: 97.5 F | DIASTOLIC BLOOD PRESSURE: 51 MMHG | OXYGEN SATURATION: 99 % | WEIGHT: 152 LBS | HEIGHT: 68 IN | SYSTOLIC BLOOD PRESSURE: 117 MMHG | HEART RATE: 72 BPM | RESPIRATION RATE: 20 BRPM

## 2021-08-16 DIAGNOSIS — R74.8 ELEVATED LIVER ENZYMES: ICD-10-CM

## 2021-08-16 LAB
INR PPP: 0.9 (ref 0.8–1.2)
PLATELET # BLD AUTO: 280 10*3/MM3 (ref 140–450)
PROTHROMBIN TIME: 11 SECONDS (ref 12.8–15.2)

## 2021-08-16 PROCEDURE — 88307 TISSUE EXAM BY PATHOLOGIST: CPT | Performed by: INTERNAL MEDICINE

## 2021-08-16 PROCEDURE — 25010000002 MIDAZOLAM PER 1 MG: Performed by: RADIOLOGY

## 2021-08-16 PROCEDURE — 77012 CT SCAN FOR NEEDLE BIOPSY: CPT

## 2021-08-16 PROCEDURE — 85049 AUTOMATED PLATELET COUNT: CPT | Performed by: RADIOLOGY

## 2021-08-16 PROCEDURE — 88313 SPECIAL STAINS GROUP 2: CPT | Performed by: INTERNAL MEDICINE

## 2021-08-16 PROCEDURE — 25010000002 FENTANYL CITRATE (PF) 50 MCG/ML SOLUTION: Performed by: RADIOLOGY

## 2021-08-16 PROCEDURE — 85610 PROTHROMBIN TIME: CPT

## 2021-08-16 PROCEDURE — 25010000003 LIDOCAINE 1 % SOLUTION: Performed by: RADIOLOGY

## 2021-08-16 RX ORDER — LIDOCAINE HYDROCHLORIDE 10 MG/ML
20 INJECTION, SOLUTION INFILTRATION; PERINEURAL ONCE
Status: COMPLETED | OUTPATIENT
Start: 2021-08-16 | End: 2021-08-16

## 2021-08-16 RX ORDER — SODIUM CHLORIDE 9 MG/ML
INJECTION, SOLUTION INTRAVENOUS
Status: COMPLETED | OUTPATIENT
Start: 2021-08-16 | End: 2021-08-16

## 2021-08-16 RX ORDER — FENTANYL CITRATE 50 UG/ML
INJECTION, SOLUTION INTRAMUSCULAR; INTRAVENOUS
Status: COMPLETED | OUTPATIENT
Start: 2021-08-16 | End: 2021-08-16

## 2021-08-16 RX ORDER — SODIUM CHLORIDE 0.9 % (FLUSH) 0.9 %
3 SYRINGE (ML) INJECTION EVERY 12 HOURS SCHEDULED
Status: DISCONTINUED | OUTPATIENT
Start: 2021-08-16 | End: 2021-08-17 | Stop reason: HOSPADM

## 2021-08-16 RX ORDER — MIDAZOLAM HYDROCHLORIDE 1 MG/ML
INJECTION INTRAMUSCULAR; INTRAVENOUS
Status: COMPLETED | OUTPATIENT
Start: 2021-08-16 | End: 2021-08-16

## 2021-08-16 RX ORDER — SODIUM CHLORIDE 0.9 % (FLUSH) 0.9 %
10 SYRINGE (ML) INJECTION AS NEEDED
Status: DISCONTINUED | OUTPATIENT
Start: 2021-08-16 | End: 2021-08-17 | Stop reason: HOSPADM

## 2021-08-16 RX ORDER — SODIUM CHLORIDE 9 MG/ML
25 INJECTION, SOLUTION INTRAVENOUS ONCE
Status: DISCONTINUED | OUTPATIENT
Start: 2021-08-16 | End: 2021-08-17 | Stop reason: HOSPADM

## 2021-08-16 RX ADMIN — GELATIN ABSORBABLE SPONGE 12-7 MM: 12-7 MISC at 12:58

## 2021-08-16 RX ADMIN — MIDAZOLAM 1 MG: 1 INJECTION INTRAMUSCULAR; INTRAVENOUS at 12:48

## 2021-08-16 RX ADMIN — SODIUM CHLORIDE 25 ML/HR: 9 INJECTION, SOLUTION INTRAVENOUS at 12:31

## 2021-08-16 RX ADMIN — LIDOCAINE HYDROCHLORIDE 20 ML: 10 INJECTION, SOLUTION INFILTRATION; PERINEURAL at 12:50

## 2021-08-16 RX ADMIN — FENTANYL CITRATE 50 MCG: 50 INJECTION INTRAMUSCULAR; INTRAVENOUS at 12:49

## 2021-08-16 NOTE — NURSING NOTE
Discharge instructions provided and reviewed with patient and . Patient verbalized understanding and was able to teach back instructions.

## 2021-08-16 NOTE — NURSING NOTE
Patient out to car via wheelchair and NA assist. Patient's  picked up. No issues or concerns noted.

## 2021-08-16 NOTE — DISCHARGE INSTRUCTIONS
EDUCATION /DISCHARGE INSTRUCTIONS  CT/US guided biopsy:  A biopsy is a procedure done to remove tissue for further analysis.  Before images are taken to locate the target area.  Images can be obtained using ultrasound, CT or MRI.  A physician will clean your skin with antiseptic soap, place a sterile towel around the site and administer a local anesthetic to numb the area.  The physician will then insert a special needle.  Sometimes images are taken of the needle after it is inserted to ensure the needle is in the correct area to be biopsied.   A sample is obtained and sent to the laboratory for study.  Occasionally the laboratory is unable to make a diagnosis from the sample and the procedure may need to be repeated.  Within a week the radiologist will send a report to your physician.  A pathologist will also examine the tissue and send a report.    Risks of the procedure include but are not limited to:   *  Bleeding    *  Infection   *  Puncture of surrounding organs *  Death     *  Lung collapse if the biopsy is near the chest which may require insertion of a      chest tube to re-inflate the lung if severe.    Benefits of the procedure:  Using x-ray helps to locate the area that requires a biopsy. The procedure is less invasive than a surgical procedure, there are no large incisions and it does not require anesthesia.    Alternatives to the procedure:  A biopsy can be performed surgically.  Risks of a surgical biopsy include exposure to anesthesia, infection, excessive bleeding and injury to abdominal organs.  A benefit of surgical biopsy is the ability to see the area to be biopsied and remove of a larger piece of tissue.    THIS EDUCATION INFORMATION WAS REVIEWED PRIOR TO PROCEDURE AND CONSENT. Patient initials__________________Time_______1133____________    Post Procedure:    *  Expect the biopsy site may be tender up to one week.    *  Rest today (no pushing pulling or straining).   *  Slowly increase  activity tomorrow.    *  If you received sedation do not drive for 24 hours.   *  Keep dressing clean and dry.   *  Leave dressing on puncture site for 24 hours.    *  You may shower when dressing removed.  Call your doctor if experiencing:   *  Signs of infection such as redness, swelling, excessive pain and / or foul        smelling drainage from the puncture site.   *  Chills or fever over 101 degrees (by mouth).   *  Unrelieved pain.   *  Any new or severe symptoms.   *  If experiencing sudden / severe shortness of breath or chest pain go to the       nearest emergency room.   Following the procedure:     Follow-up with the ordering physician as directed.    Continue to take other medications as directed by your physician unless    otherwise instructed.   If applicable, resume taking your blood thinners or Aspirin on ____n/a_______.    If you have any concerns please call the Radiology Nurses Desk at [phone].  You are the most important factor in your recovery.  Follow the above instructions carefully.    Moderate Sedation  Sedation is the use of medicines to promote relaxation and relieve discomfort and anxiety during your procedure. Moderate sedation is a type of mild sedation, it is not anesthesia. When receiving moderate sedation you are less alert than normal, but you are still able to respond to instructions, touch, or both.    What are the risks?  Generally, this is a safe procedure. However, problems may occur, including but not limited to:  · Getting too much medicine (over sedation).  · Nausea or vomiting  · Allergic reaction to medicines.  · Trouble breathing. If this happens, a breathing tube may be used to help with breathing. It will be removed when you are awake and breathing on your own.  ·   What happens prior to the procedure?  · An IV catheter will be inserted into one of your veins.  · Your nurse will do a full work up including reviewing your medications, allergies, medical history,  who is taking your home and other pertinent information.  · Medicine to help you relax will be given through the IV tubing.  · The registered nurse and physician will monitor you closely during the entire procedure.  ·   What happens after the procedure?  · Your blood pressure, heart rate, breathing rate, and blood oxygen level will be monitored often until the medicines you were given have worn off.  · Do not drive for 24 hours.  · If you are an inpatient, you will return to your room where your nurse will monitor you appropriately.        I read, or had read to me, this education sheet.        __________________________________________________________      _________8/16/21 1133_____________________________  Patient/Person authorized to sign for patient                                                    Date/Time          ___________________________________________________________     ________8/16/21 1133______________________________       Witness signature                                                                                              Date/Time    Discharge Instructions after receiving Moderate Sedation  These instructions provide you with information about caring for yourself after your procedure. Your health care provider may also give you more specific instructions. Your treatment has been planned according to current medical practices, but problems sometimes occur. Call your physican or the Radiology Nurses ([phone]) if you have any problems or questions after your procedure.    What can I expect after the procedure?  After your procedure, you may:  Feel sleepy or light headed for several hours.  Feel clumsy, dizzy or have poor balance for several hours.  Feel forgetful about what happened after the procedure.  Have poor judgment for several hours.  Feel nauseous or vomit.    For at least 24 hours after the procedure:  Have a responsible adult stay with you or frequently check on you until  you are awake and alert.   Rest as needed.    Do not:  Participate in activities in which you could fall or become injured.  Drive or operate heavy machinery  Take sleeping pills or medicines that cause drowsiness.  Make important decisions or sign legal documents.  Take care of children on your own.    Eating and drinking: Clear liquids then progress slowly to a normal diet.  If you vomit, drink water, juice, or soup when you can drink without vomiting.  Make sure you have little or no nausea before eating solid foods.    General instructions: Take over-the-counter and prescription medicines only as told by your health care provider .If you have sleep apnea, surgery and certain medicines can increase your risk for breathing problems. Follow instructions from your health care provider about wearing your sleep device: If you smoke, do not smoke without supervision.  Keep all follow-up visits as told by your health care provider. This is important.    Contact your physician if:  You continue to keep feeling nauseous or you keep vomiting. You continue to feel light-headed, develop a fever or a rash.  Get help right away if you have trouble breathing    I acknowledge the above instructions:    Patient/ Responsible person _________________________________Date _8/16/21___________Time ___1133_________    Witnessed by____________________________________________ Date_____8/16/21________ Time____1133________

## 2021-08-16 NOTE — NURSING NOTE
Patient arrived in xray triage for a liver biopsy. Protective goggles and mask in place with all patient interactions today.

## 2021-08-17 ENCOUNTER — TELEPHONE (OUTPATIENT)
Dept: INTERVENTIONAL RADIOLOGY/VASCULAR | Facility: HOSPITAL | Age: 71
End: 2021-08-17

## 2021-08-17 LAB
CYTO UR: NORMAL
LAB AP CASE REPORT: NORMAL
LAB AP CLINICAL INFORMATION: NORMAL
LAB AP DIAGNOSIS COMMENT: NORMAL
LAB AP SPECIAL STAINS: NORMAL
PATH REPORT.FINAL DX SPEC: NORMAL
PATH REPORT.GROSS SPEC: NORMAL

## 2021-08-19 NOTE — PROGRESS NOTES
I called and spoke with her regarding her liver biopsy results.  The liver tissue is normal.  Very minimal nonspecific inflammation of no consequence.  This goes along with her very normal imaging as well as her completely normal liver serologic work-up.  I believe that her mild elevation in her AST and ALT are likely her baseline.  However, I gave her the caveat that should her numbers significantly increase, that would be abnormal but if she basically stays around her current range that is reasonable.

## 2021-09-16 ENCOUNTER — PREP FOR SURGERY (OUTPATIENT)
Dept: OTHER | Facility: HOSPITAL | Age: 71
End: 2021-09-16

## 2021-09-16 DIAGNOSIS — N81.6 RECTOCELE: ICD-10-CM

## 2021-09-16 DIAGNOSIS — N81.5 VAGINAL ENTEROCELE, CONGENITAL OR ACQUIRED: ICD-10-CM

## 2021-09-16 DIAGNOSIS — N39.3 FEMALE STRESS INCONTINENCE: ICD-10-CM

## 2021-09-16 DIAGNOSIS — N81.12 CYSTOCELE, LATERAL: ICD-10-CM

## 2021-09-16 DIAGNOSIS — N99.3 PROLAPSE OF VAGINAL VAULT AFTER HYSTERECTOMY: Primary | ICD-10-CM

## 2021-09-16 DIAGNOSIS — N81.11 CYSTOCELE, MIDLINE: ICD-10-CM

## 2021-09-16 RX ORDER — METHENAMINE, SODIUM PHOSPHATE, MONOBASIC, MONOHYDRATE, PHENYL SALICYLATE, METHYLENE BLUE, AND HYOSCYAMINE SULFATE 120; 40.8; 36; 10; .12 MG/1; MG/1; MG/1; MG/1; MG/1
1 CAPSULE ORAL
Status: CANCELLED | OUTPATIENT
Start: 2021-09-23

## 2021-09-16 RX ORDER — CEFAZOLIN SODIUM 2 G/100ML
2 INJECTION, SOLUTION INTRAVENOUS ONCE
Status: CANCELLED | OUTPATIENT
Start: 2021-09-23 | End: 2021-09-16

## 2021-09-16 RX ORDER — SODIUM CHLORIDE 0.9 % (FLUSH) 0.9 %
10 SYRINGE (ML) INJECTION AS NEEDED
Status: CANCELLED | OUTPATIENT
Start: 2021-09-23

## 2021-09-16 RX ORDER — SODIUM CHLORIDE 0.9 % (FLUSH) 0.9 %
3 SYRINGE (ML) INJECTION EVERY 12 HOURS SCHEDULED
Status: CANCELLED | OUTPATIENT
Start: 2021-09-23

## 2021-09-17 PROBLEM — N39.3 FEMALE STRESS INCONTINENCE: Status: ACTIVE | Noted: 2021-09-17

## 2021-09-17 PROBLEM — N81.5 VAGINAL ENTEROCELE, CONGENITAL OR ACQUIRED: Status: ACTIVE | Noted: 2021-09-17

## 2021-09-17 PROBLEM — N99.3 PROLAPSE OF VAGINAL VAULT AFTER HYSTERECTOMY: Status: ACTIVE | Noted: 2021-09-17

## 2021-09-17 PROBLEM — N81.11 CYSTOCELE, MIDLINE: Status: ACTIVE | Noted: 2021-09-17

## 2021-09-17 PROBLEM — N81.12 CYSTOCELE, LATERAL: Status: ACTIVE | Noted: 2021-09-17

## 2021-09-17 PROBLEM — N81.6 RECTOCELE: Status: ACTIVE | Noted: 2021-09-17

## 2021-09-20 ENCOUNTER — PRE-ADMISSION TESTING (OUTPATIENT)
Dept: PREADMISSION TESTING | Facility: HOSPITAL | Age: 71
End: 2021-09-20

## 2021-09-20 VITALS
BODY MASS INDEX: 23.36 KG/M2 | WEIGHT: 154.1 LBS | HEIGHT: 68 IN | RESPIRATION RATE: 20 BRPM | TEMPERATURE: 98 F | HEART RATE: 65 BPM | DIASTOLIC BLOOD PRESSURE: 75 MMHG | SYSTOLIC BLOOD PRESSURE: 161 MMHG | OXYGEN SATURATION: 98 %

## 2021-09-20 LAB
ALBUMIN SERPL-MCNC: 4.3 G/DL (ref 3.5–5.2)
ALP SERPL-CCNC: 53 U/L (ref 39–117)
ALT SERPL W P-5'-P-CCNC: 36 U/L (ref 1–33)
ANION GAP SERPL CALCULATED.3IONS-SCNC: 9.5 MMOL/L (ref 5–15)
AST SERPL-CCNC: 55 U/L (ref 1–32)
BILIRUB CONJ SERPL-MCNC: <0.2 MG/DL (ref 0–0.3)
BILIRUB INDIRECT SERPL-MCNC: ABNORMAL MG/DL
BILIRUB SERPL-MCNC: 0.4 MG/DL (ref 0–1.2)
BUN SERPL-MCNC: 20 MG/DL (ref 8–23)
BUN/CREAT SERPL: 34.5 (ref 7–25)
CALCIUM SPEC-SCNC: 9.5 MG/DL (ref 8.6–10.5)
CHLORIDE SERPL-SCNC: 101 MMOL/L (ref 98–107)
CO2 SERPL-SCNC: 26.5 MMOL/L (ref 22–29)
CREAT SERPL-MCNC: 0.58 MG/DL (ref 0.57–1)
DEPRECATED RDW RBC AUTO: 44.1 FL (ref 37–54)
ERYTHROCYTE [DISTWIDTH] IN BLOOD BY AUTOMATED COUNT: 12.7 % (ref 12.3–15.4)
GFR SERPL CREATININE-BSD FRML MDRD: 103 ML/MIN/1.73
GLUCOSE SERPL-MCNC: 105 MG/DL (ref 65–99)
HCT VFR BLD AUTO: 42.6 % (ref 34–46.6)
HGB BLD-MCNC: 13.7 G/DL (ref 12–15.9)
MCH RBC QN AUTO: 30.2 PG (ref 26.6–33)
MCHC RBC AUTO-ENTMCNC: 32.2 G/DL (ref 31.5–35.7)
MCV RBC AUTO: 93.8 FL (ref 79–97)
PLATELET # BLD AUTO: 334 10*3/MM3 (ref 140–450)
PMV BLD AUTO: 9.4 FL (ref 6–12)
POTASSIUM SERPL-SCNC: 4.6 MMOL/L (ref 3.5–5.2)
PROT SERPL-MCNC: 7.2 G/DL (ref 6–8.5)
QT INTERVAL: 421 MS
RBC # BLD AUTO: 4.54 10*6/MM3 (ref 3.77–5.28)
SARS-COV-2 ORF1AB RESP QL NAA+PROBE: NOT DETECTED
SODIUM SERPL-SCNC: 137 MMOL/L (ref 136–145)
WBC # BLD AUTO: 5.41 10*3/MM3 (ref 3.4–10.8)

## 2021-09-20 PROCEDURE — U0004 COV-19 TEST NON-CDC HGH THRU: HCPCS | Performed by: NURSE PRACTITIONER

## 2021-09-20 PROCEDURE — 80048 BASIC METABOLIC PNL TOTAL CA: CPT

## 2021-09-20 PROCEDURE — 80076 HEPATIC FUNCTION PANEL: CPT | Performed by: OBSTETRICS & GYNECOLOGY

## 2021-09-20 PROCEDURE — 85027 COMPLETE CBC AUTOMATED: CPT

## 2021-09-20 PROCEDURE — C9803 HOPD COVID-19 SPEC COLLECT: HCPCS | Performed by: NURSE PRACTITIONER

## 2021-09-20 PROCEDURE — 93005 ELECTROCARDIOGRAM TRACING: CPT

## 2021-09-20 PROCEDURE — 93010 ELECTROCARDIOGRAM REPORT: CPT | Performed by: INTERNAL MEDICINE

## 2021-09-20 PROCEDURE — 36415 COLL VENOUS BLD VENIPUNCTURE: CPT

## 2021-09-20 PROCEDURE — U0005 INFEC AGEN DETEC AMPLI PROBE: HCPCS | Performed by: NURSE PRACTITIONER

## 2021-09-20 NOTE — DISCHARGE INSTRUCTIONS
Take the following medications the morning of surgery:  NONE    ARRIVE TO 9-23-21 AT 5:30AM      If you are on prescription narcotic pain medication to control your pain you may also take that medication the morning of surgery.    General Instructions:  • Do not eat solid food after midnight the night before surgery.  • You may drink clear liquids day of surgery but must stop at least one hour before your hospital arrival time.  • It is beneficial for you to have a clear drink that contains carbohydrates the day of surgery.  We suggest a 12 to 20 ounce bottle of Gatorade or Powerade for non-diabetic patients or a 12 to 20 ounce bottle of G2 or Powerade Zero for diabetic patients. (Pediatric patients, are not advised to drink a 12 to 20 ounce carbohydrate drink)    Clear liquids are liquids you can see through.  Nothing red in color.     Plain water                               Sports drinks  Sodas                                   Gelatin (Jell-O)  Fruit juices without pulp such as white grape juice and apple juice  Popsicles that contain no fruit or yogurt  Tea or coffee (no cream or milk added)  Gatorade / Powerade  G2 / Powerade Zero    • Infants may have breast milk up to four hours before surgery.  • Infants drinking formula may drink formula up to six hours before surgery.   • Patients who avoid smoking, chewing tobacco and alcohol for 4 weeks prior to surgery have a reduced risk of post-operative complications.  Quit smoking as many days before surgery as you can.  • Do not smoke, use chewing tobacco or drink alcohol the day of surgery.   • If applicable bring your C-PAP/ BI-PAP machine.  • Bring any papers given to you in the doctor’s office.  • Wear clean comfortable clothes.  • Do not wear contact lenses, false eyelashes or make-up.  Bring a case for your glasses.   • Bring crutches or walker if applicable.  • Remove all piercings.  Leave jewelry and any other valuables at home.  • Hair extensions with  metal clips must be removed prior to surgery.  • The Pre-Admission Testing nurse will instruct you to bring medications if unable to obtain an accurate list in Pre-Admission Testing.        Preventing a Surgical Site Infection:  • For 2 to 3 days before surgery, avoid shaving with a razor because the razor can irritate skin and make it easier to develop an infection.    • Any areas of open skin can increase the risk of a post-operative wound infection by allowing bacteria to enter and travel throughout the body.  Notify your surgeon if you have any skin wounds / rashes even if it is not near the expected surgical site.  The area will need assessed to determine if surgery should be delayed until it is healed.  • The night prior to surgery shower using a fresh bar of anti-bacterial soap (such as Dial) and clean washcloth.  Sleep in a clean bed with clean clothing.  Do not allow pets to sleep with you.  • Shower on the morning of surgery using a fresh bar of anti-bacterial soap (such as Dial) and clean washcloth.  Dry with a clean towel and dress in clean clothing.  • Ask your surgeon if you will be receiving antibiotics prior to surgery.  • Make sure you, your family, and all healthcare providers clean their hands with soap and water or an alcohol based hand  before caring for you or your wound.    Day of surgery:  Your arrival time is approximately two hours before your scheduled surgery time.  Upon arrival, a Pre-op nurse and Anesthesiologist will review your health history, obtain vital signs, and answer questions you may have.  The only belongings needed at this time will be a list of your home medications and if applicable your C-PAP/BI-PAP machine.  A Pre-op nurse will start an IV and you may receive medication in preparation for surgery, including something to help you relax.     Please be aware that surgery does come with discomfort.  We want to make every effort to control your discomfort so please  discuss any uncontrolled symptoms with your nurse.   Your doctor will most likely have prescribed pain medications.      If you are going home after surgery you will receive individualized written care instructions before being discharged.  A responsible adult must drive you to and from the hospital on the day of your surgery and stay with you for 24 hours.  Discharge prescriptions can be filled by the hospital pharmacy during regular pharmacy hours.  If you are having surgery late in the day/evening your prescription may be e-prescribed to your pharmacy.  Please verify your pharmacy hours or chose a 24 hour pharmacy to avoid not having access to your prescription because your pharmacy has closed for the day.    If you are staying overnight following surgery, you will be transported to your hospital room following the recovery period.  [facility] has all private rooms.    If you have any questions please call Pre-Admission Testing at [phone].  Deductibles and co-payments are collected on the day of service. Please be prepared to pay the required co-pay, deductible or deposit on the day of service as defined by your plan.    Patient Education for Self-Quarantine Process    • Following your COVID testing, we strongly recommend that you wear a mask when you are with other people and practice social distancing.   • Limit your activities to only required outings.  • Wash your hands with soap and water frequently for at least 20 seconds.   • Avoid touching your eyes, nose and mouth with unwashed hands.  • Do not share anything - utensils, drinking glasses, food from the same bowl.   • Sanitize household surfaces daily. Include all high touch areas (door handles, light switches, phones, countertops, etc.)    Call your surgeon immediately if you experience any of the following symptoms:  • Sore Throat  • Shortness of Breath or difficulty breathing  • Cough  • Chills  • Body soreness or muscle  pain  • Headache  • Fever  • New loss of taste or smell  • Do not arrive for your surgery ill.  Your procedure will need to be rescheduled to another time.  You will need to call your physician before the day of surgery to avoid any unnecessary exposure to hospital staff as well as other patients.      CHLORHEXIDINE CLOTH INSTRUCTIONS  The morning of surgery follow these instructions using the Chlorhexidine cloths you've been given.  These steps reduce bacteria on the body.  Do not use the cloths near your eyes, ears mouth, genitalia or on open wounds.  Throw the cloths away after use but do not try to flush them down a toilet.      • Open and remove one cloth at a time from the package.    • Leave the cloth unfolded and begin the bathing.  • Massage the skin with the cloths using gentle pressure to remove bacteria.  Do not scrub harshly.   • Follow the steps below with one 2% CHG cloth per area (6 total cloths).  • One cloth for neck, shoulders and chest.  • One cloth for both arms, hands, fingers and underarms (do underarms last).  • One cloth for the abdomen followed by groin.  • One cloth for right leg and foot including between the toes.  • One cloth for left leg and foot including between the toes.  • The last cloth is to be used for the back of the neck, back and buttocks.    Allow the CHG to air dry 3 minutes on the skin which will give it time to work and decrease the chance of irritation.  The skin may feel sticky until it is dry.  Do not rinse with water or any other liquid or you will lose the beneficial effects of the CHG.  If mild skin irritation occurs, do rinse the skin to remove the CHG.  Report this to the nurse at time of admission.  Do not apply lotions, creams, ointments, deodorants or perfumes after using the clothes. Dress in clean clothes before coming to the hospital.

## 2021-09-21 NOTE — H&P
Female Pelvic Medicine and Reconstructive Surgery   History & Physical    Patient Identification:  Name: Melany Lloyd Age: 71 y.o. Sex: female :  1950 MRN: Female Pelvic Medicine and Reconstructive Surgery   History & Physical    Patient Identification:  Name: Melany Lloyd Age: 71 y.o. Sex: female :  1950 MRN: [id number]                            Problem List:    Prolapse of vaginal vault after hysterectomy    Cystocele, midline    Cystocele, lateral    Vaginal enterocele, congenital or acquired    Rectocele    Female stress incontinence    Past Medical History:  Past Medical History:   Diagnosis Date   • Allergic    • Anxiety    • Chronic headache    • DDD (degenerative disc disease), lumbosacral    • Fibromyalgia    • Frequent urination    • History of Bell's palsy    • History of encephalitis     ABOUT 15 YEARS AGO   • IBS (irritable bowel syndrome)    • Lupus (CMS/HCC)    • OP (osteoporosis)    • Osteoporosis 2016   • Shortness of breath    • Urinary urgency    • Urine incontinence     WEARS PAD     Past Surgical History:  Past Surgical History:   Procedure Laterality Date   • BREAST CYST EXCISION Left     X2   • CATARACT EXTRACTION, BILATERAL  2013    LENS BILAT   • COLONOSCOPY  2016    Normal   • COLONOSCOPY N/A 3/5/2021    Procedure: COLONOSCOPY INTO CECUM;  Surgeon: Lorraine Mejia MD;  Location: [facility] ENDOSCOPY;  Service: Gastroenterology;  Laterality: N/A;  PRE-FAMILY HISTORY OF COLON CANCER  POST- HEMORRHOIDS, DIVERTICULOSIS   • HYSTERECTOMY     • OOPHORECTOMY Bilateral    • REPLACEMENT TOTAL KNEE Right 01/15/2013    Christian Sanders MD   • SHOULDER ARTHROSCOPY W/ ROTATOR CUFF REPAIR Right 2018    RTC Repair, Acromioplasty, Thierry, Debridement of Labral - Nellie Walker MD   • TUBAL ABDOMINAL LIGATION        Home Meds:  No medications prior to admission.     Current Meds:   No current facility-administered medications for this encounter.    Current Outpatient  Medications:   •  Cholecalciferol (VITAMIN D) 2000 UNITS capsule, Take 1,000 Units by mouth Daily. HOLDING FOR SURGERY, Disp: , Rfl:   •  Diclofenac Sodium (Voltaren) 1 % gel gel, Apply 2 g topically to the appropriate area as directed Every Night. HOLD FOR SURGERY, Disp: , Rfl:   •  FIBER PO, Take 1 capsule by mouth Daily. GUMMIES, Disp: , Rfl:   •  Flaxseed, Linseed, (FLAX SEED OIL PO), Take 1 capsule by mouth Daily. HOLDING FOR SURGERY, Disp: , Rfl:   •  FOLIC ACID PO, Take 1 capsule by mouth Daily. HOLDING FOR SURGERY, Disp: , Rfl:   •  MILK THISTLE PO, Take 1 capsule by mouth 2 (Two) Times a Day. HOLDING FOR SURGERY, Disp: , Rfl:   •  Naproxen Sodium 220 MG capsule, Take 220 mg by mouth Daily. HOLDING FOR SURGERY, Disp: , Rfl:   •  Omega-3 Fatty Acids (FISH OIL PO), Take 1 tablet by mouth 2 (Two) Times a Day. HOLDING FOR SURGERY, Disp: , Rfl:   •  Probiotic Product (PROBIOTIC ADVANCED PO), Take 1 capsule by mouth Daily., Disp: , Rfl:   •  vitamin B-12 (CYANOCOBALAMIN) 1000 MCG tablet, Take 1 tablet by mouth Daily. HOLDING FOR SURGERY, Disp: , Rfl:   •  vitamin B-6 (PYRIDOXINE) 50 MG tablet, Take 50 mg by mouth Daily. HOLDING FOR SURGERY, Disp: , Rfl:   Allergies:  Allergies   Allergen Reactions   • Diphenhydramine Hcl Itching   • Penicillins Itching and Rash   • Red Dye Itching and Rash     Immunizations:  Immunization History   Administered Date(s) Administered   • COVID-19 (PFIZER) 02/23/2021, 03/13/2021, 09/08/2021   • FLUAD TRI 65YR+ 10/02/2018, 10/01/2019   • FluMist 2-49yrs 10/17/2013, 10/14/2015, 10/01/2016   • Fluad Quad 65+ 10/13/2020   • Fluzone High Dose =>65 Years (Vaxcare ONLY) 10/27/2017, 10/02/2018   • INFLUENZA SPLIT TRI 10/18/2017   • Pneumococcal Conjugate 13-Valent (PCV13) 02/22/2017, 10/13/2020   • Pneumococcal Polysaccharide (PPSV23) 10/03/2012, 10/18/2017, 02/22/2018   • Pneumococcal, Unspecified 01/01/2008, 01/01/2009   • Shingrix 01/29/2020, 06/09/2020   • Td 02/02/2002   • Tdap  02/04/2016   • Zostavax 06/01/2013   • flucelvax quad pfs =>4 YRS 10/01/2018     Social History:   Social History     Tobacco Use   • Smoking status: Never Smoker   • Smokeless tobacco: Never Used   Substance Use Topics   • Alcohol use: Yes     Comment: OCCASSIONALLY      Family History:  Family History   Problem Relation Age of Onset   • Stroke Mother    • Alzheimer's disease Mother    • Prostate cancer Father    • Heart disease Father    • Hypertension Father    • Mitral valve prolapse Father    • COPD Brother    • Diabetes Brother    • Arthritis Brother    • Mitral valve prolapse Brother    • Cancer Brother         bladder   • Heart disease Sister    • Hypertension Sister    • Colon cancer Sister    • Hypothyroidism Sister    • Dementia Sister    • Rheum arthritis Maternal Grandfather    • Alcohol abuse Maternal Grandfather    • Ovarian cancer Maternal Aunt    • Breast cancer Neg Hx    • Uterine cancer Neg Hx    • Malig Hyperthermia Neg Hx         Review of Systems  Constitutional:  Denies fever or chills   Eyes:  Denies change in visual acuity   HEENT:  Denies nasal congestion or sore throat   Respiratory:  Denies cough or shortness of breath   Cardiovascular:  Denies chest pain or edema   GI:  Denies abdominal pain, nausea, vomiting, bloody stools or diarrhea   :  Denies dysuria   Musculoskeletal:  Denies back pain or joint pain   Integument:  Denies rash   Neurologic:  Denies headache, focal weakness or sensory changes   Endocrine:  Denies polyuria or polydipsia   Lymphatic:  Denies swollen glands   Psychiatric:  Denies depression or anxiety       Objective:  tMax 24 hrs: No data recorded.    Vitals Ranges:        Exam:  Vital Signs: LMP  (LMP Unknown)   Constitutional:  Well developed, well nourished, no acute distress, non-toxic appearance    GI:  Soft, nondistended, normal bowel sounds, nontender, no organomegaly, no mass, no rebound, no guarding   :  No costovertebral angle tenderness    Musculoskeletal:  No edema, no tenderness, no deformities. Back- no tenderness  Integument:  Well hydrated, no rash   Lymphatic:  No lymphadenopathy noted   Neurologic:  Alert & oriented x 3,      Assessment:    Prolapse of vaginal vault after hysterectomy    Cystocele, midline    Cystocele, lateral    Vaginal enterocele, congenital or acquired    Rectocele    Female stress incontinence    Plan:  Laparoscopic uterosacral ligament colpopexy sacral colpopexy   Laparoscopic paravaginal repair   Laparoscopic abdominal enterocele repair   Pubovaginal sling   Laparoscopic bilateral salpingo-oophorectomy if present   Posterior colporrhaphy   Anterior colporrhaphy   Extraperitoneal colpopexy sacrospinous ligament fixation   Insertion of vaginal graft   Cystourethroscopy      Tamy Carrero MD  9/21/2021

## 2021-09-23 ENCOUNTER — ANESTHESIA EVENT (OUTPATIENT)
Dept: PERIOP | Facility: HOSPITAL | Age: 71
End: 2021-09-23

## 2021-09-23 ENCOUNTER — ANESTHESIA (OUTPATIENT)
Dept: PERIOP | Facility: HOSPITAL | Age: 71
End: 2021-09-23

## 2021-09-23 ENCOUNTER — HOSPITAL ENCOUNTER (OUTPATIENT)
Facility: HOSPITAL | Age: 71
Setting detail: HOSPITAL OUTPATIENT SURGERY
Discharge: HOME | End: 2021-09-23
Attending: OBSTETRICS & GYNECOLOGY | Admitting: OBSTETRICS & GYNECOLOGY

## 2021-09-23 VITALS
HEIGHT: 68 IN | TEMPERATURE: 97.5 F | HEART RATE: 74 BPM | WEIGHT: 151.1 LBS | RESPIRATION RATE: 18 BRPM | SYSTOLIC BLOOD PRESSURE: 164 MMHG | DIASTOLIC BLOOD PRESSURE: 89 MMHG | BODY MASS INDEX: 22.9 KG/M2 | OXYGEN SATURATION: 96 %

## 2021-09-23 DIAGNOSIS — N81.5 VAGINAL ENTEROCELE, CONGENITAL OR ACQUIRED: ICD-10-CM

## 2021-09-23 DIAGNOSIS — N99.3 PROLAPSE OF VAGINAL VAULT AFTER HYSTERECTOMY: ICD-10-CM

## 2021-09-23 DIAGNOSIS — N81.11 CYSTOCELE, MIDLINE: ICD-10-CM

## 2021-09-23 DIAGNOSIS — N39.3 FEMALE STRESS INCONTINENCE: ICD-10-CM

## 2021-09-23 DIAGNOSIS — N81.12 CYSTOCELE, LATERAL: ICD-10-CM

## 2021-09-23 DIAGNOSIS — N81.6 RECTOCELE: ICD-10-CM

## 2021-09-23 PROBLEM — T85.9XXA: Status: ACTIVE | Noted: 2021-09-23

## 2021-09-23 LAB
ABO GROUP BLD: NORMAL
BLD GP AB SCN SERPL QL: NEGATIVE
RH BLD: POSITIVE
T&S EXPIRATION DATE: NORMAL

## 2021-09-23 PROCEDURE — 25010000002 HEPARIN (PORCINE) PER 1000 UNITS: Performed by: OBSTETRICS & GYNECOLOGY

## 2021-09-23 PROCEDURE — 25010000003 CEFAZOLIN IN DEXTROSE 2-4 GM/100ML-% SOLUTION: Performed by: OBSTETRICS & GYNECOLOGY

## 2021-09-23 PROCEDURE — 88305 TISSUE EXAM BY PATHOLOGIST: CPT | Performed by: OBSTETRICS & GYNECOLOGY

## 2021-09-23 PROCEDURE — C1889 IMPLANT/INSERT DEVICE, NOC: HCPCS

## 2021-09-23 PROCEDURE — 25010000002 PHENYLEPHRINE PER 1 ML: Performed by: NURSE ANESTHETIST, CERTIFIED REGISTERED

## 2021-09-23 PROCEDURE — 25010000002 ONDANSETRON PER 1 MG: Performed by: NURSE ANESTHETIST, CERTIFIED REGISTERED

## 2021-09-23 PROCEDURE — 25010000002 PROPOFOL 10 MG/ML EMULSION: Performed by: NURSE ANESTHETIST, CERTIFIED REGISTERED

## 2021-09-23 PROCEDURE — 86850 RBC ANTIBODY SCREEN: CPT | Performed by: OBSTETRICS & GYNECOLOGY

## 2021-09-23 PROCEDURE — 25010000002 FENTANYL CITRATE (PF) 50 MCG/ML SOLUTION: Performed by: NURSE ANESTHETIST, CERTIFIED REGISTERED

## 2021-09-23 PROCEDURE — 86901 BLOOD TYPING SEROLOGIC RH(D): CPT | Performed by: OBSTETRICS & GYNECOLOGY

## 2021-09-23 PROCEDURE — 25010000002 HYDROMORPHONE PER 4 MG: Performed by: NURSE ANESTHETIST, CERTIFIED REGISTERED

## 2021-09-23 PROCEDURE — 25010000002 NALOXONE PER 1 MG: Performed by: NURSE ANESTHETIST, CERTIFIED REGISTERED

## 2021-09-23 PROCEDURE — 25010000002 CEFAZOLIN PER 500 MG: Performed by: NURSE ANESTHETIST, CERTIFIED REGISTERED

## 2021-09-23 PROCEDURE — 25010000002 DEXAMETHASONE PER 1 MG: Performed by: NURSE ANESTHETIST, CERTIFIED REGISTERED

## 2021-09-23 PROCEDURE — 25010000002 NEOSTIGMINE 5 MG/10ML SOLUTION: Performed by: NURSE ANESTHETIST, CERTIFIED REGISTERED

## 2021-09-23 PROCEDURE — 86900 BLOOD TYPING SEROLOGIC ABO: CPT | Performed by: OBSTETRICS & GYNECOLOGY

## 2021-09-23 DEVICE — GRFT TISS STRATTICE FIRM 6X10CM: Type: IMPLANTABLE DEVICE | Site: VAGINA | Status: FUNCTIONAL

## 2021-09-23 RX ORDER — HYDRALAZINE HYDROCHLORIDE 20 MG/ML
5 INJECTION INTRAMUSCULAR; INTRAVENOUS
Status: DISCONTINUED | OUTPATIENT
Start: 2021-09-23 | End: 2021-09-23 | Stop reason: HOSPADM

## 2021-09-23 RX ORDER — ROCURONIUM BROMIDE 10 MG/ML
INJECTION, SOLUTION INTRAVENOUS AS NEEDED
Status: DISCONTINUED | OUTPATIENT
Start: 2021-09-23 | End: 2021-09-23 | Stop reason: SURG

## 2021-09-23 RX ORDER — SODIUM CHLORIDE 0.9 % (FLUSH) 0.9 %
10 SYRINGE (ML) INJECTION AS NEEDED
Status: DISCONTINUED | OUTPATIENT
Start: 2021-09-23 | End: 2021-09-23 | Stop reason: HOSPADM

## 2021-09-23 RX ORDER — SODIUM CHLORIDE, SODIUM LACTATE, POTASSIUM CHLORIDE, CALCIUM CHLORIDE 600; 310; 30; 20 MG/100ML; MG/100ML; MG/100ML; MG/100ML
9 INJECTION, SOLUTION INTRAVENOUS CONTINUOUS
Status: DISCONTINUED | OUTPATIENT
Start: 2021-09-23 | End: 2021-09-23 | Stop reason: HOSPADM

## 2021-09-23 RX ORDER — LIDOCAINE HYDROCHLORIDE 10 MG/ML
0.5 INJECTION, SOLUTION EPIDURAL; INFILTRATION; INTRACAUDAL; PERINEURAL ONCE AS NEEDED
Status: DISCONTINUED | OUTPATIENT
Start: 2021-09-23 | End: 2021-09-23 | Stop reason: HOSPADM

## 2021-09-23 RX ORDER — LABETALOL HYDROCHLORIDE 5 MG/ML
5 INJECTION, SOLUTION INTRAVENOUS
Status: DISCONTINUED | OUTPATIENT
Start: 2021-09-23 | End: 2021-09-23 | Stop reason: HOSPADM

## 2021-09-23 RX ORDER — MAGNESIUM HYDROXIDE 1200 MG/15ML
LIQUID ORAL AS NEEDED
Status: DISCONTINUED | OUTPATIENT
Start: 2021-09-23 | End: 2021-09-23 | Stop reason: HOSPADM

## 2021-09-23 RX ORDER — ONDANSETRON 2 MG/ML
4 INJECTION INTRAMUSCULAR; INTRAVENOUS ONCE AS NEEDED
Status: DISCONTINUED | OUTPATIENT
Start: 2021-09-23 | End: 2021-09-23 | Stop reason: HOSPADM

## 2021-09-23 RX ORDER — FENTANYL CITRATE 50 UG/ML
INJECTION, SOLUTION INTRAMUSCULAR; INTRAVENOUS AS NEEDED
Status: DISCONTINUED | OUTPATIENT
Start: 2021-09-23 | End: 2021-09-23 | Stop reason: SURG

## 2021-09-23 RX ORDER — EPHEDRINE SULFATE 50 MG/ML
INJECTION, SOLUTION INTRAVENOUS AS NEEDED
Status: DISCONTINUED | OUTPATIENT
Start: 2021-09-23 | End: 2021-09-23 | Stop reason: SURG

## 2021-09-23 RX ORDER — EPHEDRINE SULFATE 50 MG/ML
5 INJECTION, SOLUTION INTRAVENOUS ONCE AS NEEDED
Status: DISCONTINUED | OUTPATIENT
Start: 2021-09-23 | End: 2021-09-23 | Stop reason: HOSPADM

## 2021-09-23 RX ORDER — PROMETHAZINE HYDROCHLORIDE 25 MG/1
25 TABLET ORAL ONCE AS NEEDED
Status: DISCONTINUED | OUTPATIENT
Start: 2021-09-23 | End: 2021-09-23 | Stop reason: HOSPADM

## 2021-09-23 RX ORDER — METHENAMINE, SODIUM PHOSPHATE, MONOBASIC, MONOHYDRATE, PHENYL SALICYLATE, METHYLENE BLUE, AND HYOSCYAMINE SULFATE 120; 40.8; 36; 10; .12 MG/1; MG/1; MG/1; MG/1; MG/1
1 CAPSULE ORAL
Status: COMPLETED | OUTPATIENT
Start: 2021-09-23 | End: 2021-09-23

## 2021-09-23 RX ORDER — NEOSTIGMINE METHYLSULFATE 0.5 MG/ML
INJECTION, SOLUTION INTRAVENOUS AS NEEDED
Status: DISCONTINUED | OUTPATIENT
Start: 2021-09-23 | End: 2021-09-23 | Stop reason: SURG

## 2021-09-23 RX ORDER — SODIUM CHLORIDE 0.9 % (FLUSH) 0.9 %
3-10 SYRINGE (ML) INJECTION AS NEEDED
Status: DISCONTINUED | OUTPATIENT
Start: 2021-09-23 | End: 2021-09-23 | Stop reason: HOSPADM

## 2021-09-23 RX ORDER — CEFAZOLIN SODIUM 2 G/100ML
2 INJECTION, SOLUTION INTRAVENOUS ONCE
Status: COMPLETED | OUTPATIENT
Start: 2021-09-23 | End: 2021-09-23

## 2021-09-23 RX ORDER — FAMOTIDINE 10 MG/ML
20 INJECTION, SOLUTION INTRAVENOUS ONCE
Status: COMPLETED | OUTPATIENT
Start: 2021-09-23 | End: 2021-09-23

## 2021-09-23 RX ORDER — LIDOCAINE HYDROCHLORIDE 20 MG/ML
INJECTION, SOLUTION INFILTRATION; PERINEURAL AS NEEDED
Status: DISCONTINUED | OUTPATIENT
Start: 2021-09-23 | End: 2021-09-23 | Stop reason: SURG

## 2021-09-23 RX ORDER — MIDAZOLAM HYDROCHLORIDE 1 MG/ML
0.5 INJECTION INTRAMUSCULAR; INTRAVENOUS
Status: DISCONTINUED | OUTPATIENT
Start: 2021-09-23 | End: 2021-09-23 | Stop reason: HOSPADM

## 2021-09-23 RX ORDER — FLUMAZENIL 0.1 MG/ML
0.2 INJECTION INTRAVENOUS AS NEEDED
Status: DISCONTINUED | OUTPATIENT
Start: 2021-09-23 | End: 2021-09-23 | Stop reason: HOSPADM

## 2021-09-23 RX ORDER — FENTANYL CITRATE 50 UG/ML
50 INJECTION, SOLUTION INTRAMUSCULAR; INTRAVENOUS
Status: DISCONTINUED | OUTPATIENT
Start: 2021-09-23 | End: 2021-09-23 | Stop reason: HOSPADM

## 2021-09-23 RX ORDER — PROMETHAZINE HYDROCHLORIDE 25 MG/1
25 SUPPOSITORY RECTAL ONCE AS NEEDED
Status: DISCONTINUED | OUTPATIENT
Start: 2021-09-23 | End: 2021-09-23 | Stop reason: HOSPADM

## 2021-09-23 RX ORDER — DEXAMETHASONE SODIUM PHOSPHATE 10 MG/ML
INJECTION INTRAMUSCULAR; INTRAVENOUS AS NEEDED
Status: DISCONTINUED | OUTPATIENT
Start: 2021-09-23 | End: 2021-09-23 | Stop reason: SURG

## 2021-09-23 RX ORDER — CEFAZOLIN SODIUM 500 MG/2.2ML
INJECTION, POWDER, FOR SOLUTION INTRAMUSCULAR; INTRAVENOUS AS NEEDED
Status: DISCONTINUED | OUTPATIENT
Start: 2021-09-23 | End: 2021-09-23 | Stop reason: SURG

## 2021-09-23 RX ORDER — IBUPROFEN 600 MG/1
600 TABLET ORAL ONCE AS NEEDED
Status: DISCONTINUED | OUTPATIENT
Start: 2021-09-23 | End: 2021-09-23 | Stop reason: HOSPADM

## 2021-09-23 RX ORDER — ONDANSETRON 2 MG/ML
INJECTION INTRAMUSCULAR; INTRAVENOUS AS NEEDED
Status: DISCONTINUED | OUTPATIENT
Start: 2021-09-23 | End: 2021-09-23 | Stop reason: SURG

## 2021-09-23 RX ORDER — NALOXONE HCL 0.4 MG/ML
0.2 VIAL (ML) INJECTION AS NEEDED
Status: DISCONTINUED | OUTPATIENT
Start: 2021-09-23 | End: 2021-09-23 | Stop reason: HOSPADM

## 2021-09-23 RX ORDER — HYDROMORPHONE HCL 110MG/55ML
PATIENT CONTROLLED ANALGESIA SYRINGE INTRAVENOUS AS NEEDED
Status: DISCONTINUED | OUTPATIENT
Start: 2021-09-23 | End: 2021-09-23 | Stop reason: SURG

## 2021-09-23 RX ORDER — OXYCODONE AND ACETAMINOPHEN 10; 325 MG/1; MG/1
1 TABLET ORAL EVERY 4 HOURS PRN
Status: DISCONTINUED | OUTPATIENT
Start: 2021-09-23 | End: 2021-09-23 | Stop reason: HOSPADM

## 2021-09-23 RX ORDER — PROPOFOL 10 MG/ML
VIAL (ML) INTRAVENOUS AS NEEDED
Status: DISCONTINUED | OUTPATIENT
Start: 2021-09-23 | End: 2021-09-23 | Stop reason: SURG

## 2021-09-23 RX ORDER — KETAMINE HYDROCHLORIDE 10 MG/ML
INJECTION INTRAMUSCULAR; INTRAVENOUS AS NEEDED
Status: DISCONTINUED | OUTPATIENT
Start: 2021-09-23 | End: 2021-09-23 | Stop reason: SURG

## 2021-09-23 RX ORDER — ESTRADIOL 0.1 MG/G
CREAM VAGINAL AS NEEDED
Status: DISCONTINUED | OUTPATIENT
Start: 2021-09-23 | End: 2021-09-23 | Stop reason: HOSPADM

## 2021-09-23 RX ORDER — SODIUM CHLORIDE 0.9 % (FLUSH) 0.9 %
3 SYRINGE (ML) INJECTION EVERY 12 HOURS SCHEDULED
Status: DISCONTINUED | OUTPATIENT
Start: 2021-09-23 | End: 2021-09-23 | Stop reason: HOSPADM

## 2021-09-23 RX ORDER — BUPIVACAINE HYDROCHLORIDE AND EPINEPHRINE 2.5; 5 MG/ML; UG/ML
INJECTION, SOLUTION EPIDURAL; INFILTRATION; INTRACAUDAL; PERINEURAL AS NEEDED
Status: DISCONTINUED | OUTPATIENT
Start: 2021-09-23 | End: 2021-09-23 | Stop reason: HOSPADM

## 2021-09-23 RX ORDER — GLYCOPYRROLATE 0.2 MG/ML
INJECTION INTRAMUSCULAR; INTRAVENOUS AS NEEDED
Status: DISCONTINUED | OUTPATIENT
Start: 2021-09-23 | End: 2021-09-23 | Stop reason: SURG

## 2021-09-23 RX ORDER — HYDROMORPHONE HYDROCHLORIDE 1 MG/ML
0.5 INJECTION, SOLUTION INTRAMUSCULAR; INTRAVENOUS; SUBCUTANEOUS
Status: DISCONTINUED | OUTPATIENT
Start: 2021-09-23 | End: 2021-09-23 | Stop reason: HOSPADM

## 2021-09-23 RX ORDER — HYDROCODONE BITARTRATE AND ACETAMINOPHEN 7.5; 325 MG/1; MG/1
1 TABLET ORAL ONCE AS NEEDED
Status: COMPLETED | OUTPATIENT
Start: 2021-09-23 | End: 2021-09-23

## 2021-09-23 RX ADMIN — NALOXONE HYDROCHLORIDE 0.04 MG: 0.4 INJECTION, SOLUTION INTRAMUSCULAR; INTRAVENOUS; SUBCUTANEOUS at 15:36

## 2021-09-23 RX ADMIN — DEXAMETHASONE SODIUM PHOSPHATE 8 MG: 10 INJECTION INTRAMUSCULAR; INTRAVENOUS at 07:40

## 2021-09-23 RX ADMIN — CEFAZOLIN 2 G: 225 INJECTION, POWDER, FOR SOLUTION INTRAMUSCULAR; INTRAVENOUS at 11:22

## 2021-09-23 RX ADMIN — KETAMINE HYDROCHLORIDE 10 MG: 10 INJECTION INTRAMUSCULAR; INTRAVENOUS at 10:38

## 2021-09-23 RX ADMIN — FENTANYL CITRATE 50 MCG: 50 INJECTION INTRAMUSCULAR; INTRAVENOUS at 16:30

## 2021-09-23 RX ADMIN — NEOSTIGMINE METHYLSULFATE 3 MG: 0.5 INJECTION INTRAVENOUS at 15:17

## 2021-09-23 RX ADMIN — KETAMINE HYDROCHLORIDE 10 MG: 10 INJECTION INTRAMUSCULAR; INTRAVENOUS at 08:40

## 2021-09-23 RX ADMIN — PROPOFOL 100 MG: 10 INJECTION, EMULSION INTRAVENOUS at 07:40

## 2021-09-23 RX ADMIN — HYDROMORPHONE HYDROCHLORIDE 0.5 MG: 1 INJECTION, SOLUTION INTRAMUSCULAR; INTRAVENOUS; SUBCUTANEOUS at 16:55

## 2021-09-23 RX ADMIN — HYDROMORPHONE HYDROCHLORIDE 0.5 MG: 2 INJECTION, SOLUTION INTRAMUSCULAR; INTRAVENOUS; SUBCUTANEOUS at 12:24

## 2021-09-23 RX ADMIN — EPHEDRINE SULFATE 20 MG: 50 INJECTION INTRAVENOUS at 08:52

## 2021-09-23 RX ADMIN — ROCURONIUM BROMIDE 10 MG: 50 INJECTION INTRAVENOUS at 11:05

## 2021-09-23 RX ADMIN — SODIUM CHLORIDE, POTASSIUM CHLORIDE, SODIUM LACTATE AND CALCIUM CHLORIDE: 600; 310; 30; 20 INJECTION, SOLUTION INTRAVENOUS at 12:02

## 2021-09-23 RX ADMIN — GLYCOPYRROLATE 0.2 MG: 0.2 INJECTION INTRAMUSCULAR; INTRAVENOUS at 08:54

## 2021-09-23 RX ADMIN — LIDOCAINE HYDROCHLORIDE 50 MG: 20 INJECTION, SOLUTION INFILTRATION; PERINEURAL at 07:40

## 2021-09-23 RX ADMIN — GLYCOPYRROLATE 0.6 MG: 0.2 INJECTION INTRAMUSCULAR; INTRAVENOUS at 15:17

## 2021-09-23 RX ADMIN — ROCURONIUM BROMIDE 10 MG: 50 INJECTION INTRAVENOUS at 08:40

## 2021-09-23 RX ADMIN — ROCURONIUM BROMIDE 40 MG: 50 INJECTION INTRAVENOUS at 07:40

## 2021-09-23 RX ADMIN — PHENYLEPHRINE HYDROCHLORIDE 100 MCG: 10 INJECTION INTRAVENOUS at 08:54

## 2021-09-23 RX ADMIN — CEFAZOLIN SODIUM 2 G: 2 INJECTION, SOLUTION INTRAVENOUS at 07:22

## 2021-09-23 RX ADMIN — ROCURONIUM BROMIDE 20 MG: 50 INJECTION INTRAVENOUS at 09:43

## 2021-09-23 RX ADMIN — FENTANYL CITRATE 100 MCG: 0.05 INJECTION, SOLUTION INTRAMUSCULAR; INTRAVENOUS at 07:40

## 2021-09-23 RX ADMIN — KETAMINE HYDROCHLORIDE 10 MG: 10 INJECTION INTRAMUSCULAR; INTRAVENOUS at 09:43

## 2021-09-23 RX ADMIN — NALOXONE HYDROCHLORIDE 0.04 MG: 0.4 INJECTION, SOLUTION INTRAMUSCULAR; INTRAVENOUS; SUBCUTANEOUS at 15:39

## 2021-09-23 RX ADMIN — HYDROCODONE BITARTRATE AND ACETAMINOPHEN 1 TABLET: 7.5; 325 TABLET ORAL at 17:09

## 2021-09-23 RX ADMIN — FAMOTIDINE 20 MG: 10 INJECTION INTRAVENOUS at 06:55

## 2021-09-23 RX ADMIN — ROCURONIUM BROMIDE 10 MG: 50 INJECTION INTRAVENOUS at 08:59

## 2021-09-23 RX ADMIN — METHENAMINE, SODIUM PHOSPHATE, MONOBASIC, ANHYDROUS, PHENYL SALICYLATE, METHYLENE BLUE AND HYOSCYAMINE SULFATE 118 MG: 118; 40.8; 36; 10; .12 CAPSULE ORAL at 06:46

## 2021-09-23 RX ADMIN — PROPOFOL 20 MG: 10 INJECTION, EMULSION INTRAVENOUS at 11:04

## 2021-09-23 RX ADMIN — ROCURONIUM BROMIDE 10 MG: 50 INJECTION INTRAVENOUS at 10:38

## 2021-09-23 RX ADMIN — ONDANSETRON 4 MG: 2 INJECTION INTRAMUSCULAR; INTRAVENOUS at 14:10

## 2021-09-23 RX ADMIN — SODIUM CHLORIDE, POTASSIUM CHLORIDE, SODIUM LACTATE AND CALCIUM CHLORIDE 9 ML/HR: 600; 310; 30; 20 INJECTION, SOLUTION INTRAVENOUS at 06:50

## 2021-09-23 RX ADMIN — ROCURONIUM BROMIDE 10 MG: 50 INJECTION INTRAVENOUS at 12:49

## 2021-09-23 RX ADMIN — KETAMINE HYDROCHLORIDE 20 MG: 10 INJECTION INTRAMUSCULAR; INTRAVENOUS at 07:40

## 2021-09-23 RX ADMIN — SODIUM CHLORIDE, POTASSIUM CHLORIDE, SODIUM LACTATE AND CALCIUM CHLORIDE: 600; 310; 30; 20 INJECTION, SOLUTION INTRAVENOUS at 08:59

## 2021-09-23 NOTE — PLAN OF CARE
Laparoscopic uterosacral ligament colpopexy sacral colpopexy   Laparoscopic paravaginal repair   Laparoscopic abdominal enterocele repair   Pubovaginal sling   Laparoscopic bilateral salpingo-oophorectomy if present   Posterior colporrhaphy   Anterior colporrhaphy   Extraperitoneal colpopexy sacrospinous ligament fixation   Insertion of vaginal graft   Cystourethroscopy

## 2021-09-23 NOTE — OP NOTE
[facility] MAIN OR    OPERATIVE REPORT     Patient:   Melany Lloyd      :   1950     Date of Operations:  2021     PREOPERATIVE DIAGNOSES:   1.  Vaginal vault prolapse, N99.3  2.  Cystocele, N81.11, N81.12  3.  Rectocele, N81.6  4.  Enterocele, N81.5  5.  Stress urinary incontinence, N39.3  6.  Vaginal polyp, N84.2     POSTOPERATIVE DIAGNOSES: Same plus   7. Complication of vaginal mesh, T85.9XXA     SURGEON: Tamy Carrero M.D., MSc, FACOG, FACS    ASSISTANT: STEPHANIE Blevins         PROCEDURE(S) PERFORMED:   1. Laparoscopic uterosacral ligament colpopexy, 46364  2. Laparoscopic paravaginal repair, 70463  3. Laparoscopic enterocele repair, 33512  4. Pubovaginal sling, retropubic, porcine, 94405  5. Posterior colporrhaphy, 86020  6. Removal of vaginal polyp, 42612  7. Cystourethroscopy    SPECIMEN: vaginal polyp    FINDING(S): On cystourethroscopy following all the procedures, there was bilateral efflux of Pyridium stained urine from both the right and the left ureteral orifices. There were no lesions or foreign material of the bladder or the urethra. The uterus and cervix and the fallopian tubes and ovaries bilaterally were surgically absent. There was anterior vaginal mesh graft.     DESCRIPTION OF PROCEDURE(S): The patient was taken to the Operating Room with a peripheral IV in place. She underwent the induction of general endotracheal anesthesia, was prepped and draped in the dorsal lithotomy position in [facility]. Vulvar examination showed a urethral caruncle. The vulva showed resorption of labia minora and some contraction of the vagina. The vagina showed severe atrophy with petechial hemorrhages throughout the vagina. The vaginal tissue was thin. A vaginal polyp was removed and sent to pathology. Cystourethroscopy showed no lesions or foreign material of the bladder or the urethra and there was bilateral efflux of Pyridium stained urine from both the right and the  left ureteral orifices. However following instillation and drainage of the bladder there were petechial hemorrhages consistent with painful bladder syndrome/interstitial cystitis. The cystoscope was removed and a Acevedo catheter was placed and set to straight drainage.  A left upper quadrant skin incision was made, a Veress needle inserted, and a pneumoperitoneum introduced. The Veress needle was removed and a 5millimeter Opti-view was placed in the left upper quadrant. Under direct visualization, an 11 millimeter left lateral and 5 milliliter suprapubic, sub-umbilical, and right lateral ports were placed. The patient was placed in Trendelenburg and the bowel lifted superiorly. Adhesions of the large bowel to the pelvis and other pelvic adhesions were taken down in order to access the intended surgical sites. Hemostatis was excellent. With a Briesky retractor in the vagina, the posterior rectovaginal fascia was dissected from the rectovaginal serosa and with a Lucite dilator in the vagina, the pubocervical fascia was dissected from the pubocervical serosa. There was a mesh graft present in the anterior vagina. On the left apex of the vaginal there was an area which was thin and folded and with sebaceous type material which was suctioned. The area was dissected and closed with 0-Vycril suture laparoscopically. With a Briesky retractor in the vagina, the paravaginal tissue on the left and on the right was reattached with #1 Prolene and the uterosacral ligaments were attached to the paravaginal tissue ipsilaterally bilaterally to accomplish the laparoscopic bilateral paravaginal repair. With a Breisky retractor in the vagina, the uterosacral ligaments were placed on stretch and #1 Prolene was used to go through the right uterosacral ligament, the right rectovaginal fascia, the right pubocervical fascia and held. #1 Prolene was used to go through the left uterosacral ligament, the left rectovaginal fascia, the left  pubocervical fascia and both these sutures were tied accomplishing the laparoscopic uterosacral ligament colpopexy. #1 Prolene was used through the abdominal approach of a laparoscopic enterocele repair attaching the superiormost portion of the detached rectovaginal fascia and the pubocervical fascia at the apex of the vagina in the midline to each ipsilateral uterosacral ligament and these sutures were tied laparoscopically to accomplish the laparoscopic abdominal enterocele repair.  Cystourethroscopy showed no lesions or foreign material of the bladder or the urethra and there was bilateral efflux of Pyridium stained urine from both the right and left ureteral orifices. The cystoscope was removed and a Acevedo catheter was placed and set to straight drainage. The CO2 gas was allowed to escape and the left 11millimeter port was closed with Alek Mckeon Sure Close with 0 Vicryl. The remainder of the ports were removed under direct visualization and all were hemostatic and these were closed with 4-0 Vicryl. A suburethral incision was made and dissected bilaterally. A sheet of porcine material was used to create a porcine sling. The sling made of porcine biologic material is superior and safer than synthetic mesh. This retropubic 1centimeter porcine sling was placed.  Cystourethroscopy with each transvaginal introducer in place showed no lesions or foreign material of the bladder or the urethra following replacement of the introducer on the left. The bladder was hemostatic. The pubovaginal sling was adjusted without tension so that a curved Dillon easily fit between the sub-urethral tissue and the tape. The excess suprapubic tape ends were trimmed and the skin lifted away. These incisions were closed with 4- 0 Vicryl suture. A bladder instillation was placed. The sub-urethral incision was closed with 2-0 Vicryl and was hemostatic.  A posterior vaginal incision was made and the rectum dissected from the vagina. The  posterior colporrhaphy was performed, plicating the rectovaginal fascia in the midline with 0 Vicryl interrupted sutures. The perineorrhaphy was performed with interrupted 0 Vicryl.  The vaginal epithelium was closed with 2-0 Vicryl.  A vaginal pack was placed into the vagina. The patient was taken out of the dorsal lithotomy position, awakened from anesthesia and taken to the Recovery Room in good condition.  There were no complications to the procedures.  All final needle, sponge, and instrument counts were reported as correct.              ESTIMATED BLOOD LOSS:  200 milliliters     FLUIDS: 2700 milliliters     URINE OUTPUT: 600 milliliters                ALEXANDER RODRIGEZ MD, MSc, FACOG, FACS

## 2021-09-23 NOTE — H&P
Female Pelvic Medicine and Reconstructive Surgery   History & Physical    Patient Identification:  Name: Melany Lloyd Age: 71 y.o. Sex: female :  1950 MRN: Female Pelvic Medicine and Reconstructive Surgery   History & Physical    Patient Identification:  Name: Melany Lloyd Age: 71 y.o. Sex: female :  1950 MRN: [id number]                            Problem List:    Prolapse of vaginal vault after hysterectomy    Cystocele, midline    Cystocele, lateral    Vaginal enterocele, congenital or acquired    Rectocele    Female stress incontinence    Past Medical History:  Past Medical History:   Diagnosis Date   • Allergic    • Anxiety    • Chronic headache    • DDD (degenerative disc disease), lumbosacral    • Fibromyalgia    • Frequent urination    • History of Bell's palsy    • History of encephalitis     ABOUT 15 YEARS AGO   • IBS (irritable bowel syndrome)    • Lupus (CMS/HCC)    • OP (osteoporosis)    • Osteoporosis 2016   • Shortness of breath    • Urinary urgency    • Urine incontinence     WEARS PAD     Past Surgical History:  Past Surgical History:   Procedure Laterality Date   • BREAST CYST EXCISION Left     X2   • CATARACT EXTRACTION, BILATERAL  2013    LENS BILAT   • COLONOSCOPY  2016    Normal   • COLONOSCOPY N/A 3/5/2021    Procedure: COLONOSCOPY INTO CECUM;  Surgeon: Lorraine Mejia MD;  Location: [facility] ENDOSCOPY;  Service: Gastroenterology;  Laterality: N/A;  PRE-FAMILY HISTORY OF COLON CANCER  POST- HEMORRHOIDS, DIVERTICULOSIS   • HYSTERECTOMY     • OOPHORECTOMY Bilateral    • REPLACEMENT TOTAL KNEE Right 01/15/2013    Christian Sanders MD   • SHOULDER ARTHROSCOPY W/ ROTATOR CUFF REPAIR Right 2018    RTC Repair, Acromioplasty, Thierry, Debridement of Labral - Nellie Walker MD   • TUBAL ABDOMINAL LIGATION        Home Meds:  Medications Prior to Admission   Medication Sig Dispense Refill Last Dose   • Chlorhexidine Gluconate 2 % pads Apply 3 pads topically Take As  Directed.   9/23/2021 at 0445   • Cholecalciferol (VITAMIN D) 2000 UNITS capsule Take 1,000 Units by mouth Daily. HOLDING FOR SURGERY   9/17/2021   • Diclofenac Sodium (Voltaren) 1 % gel gel Apply 2 g topically to the appropriate area as directed Every Night. HOLD FOR SURGERY   Past Week at Unknown time   • FIBER PO Take 1 capsule by mouth Daily. GUMMIES   9/21/2021   • Flaxseed, Linseed, (FLAX SEED OIL PO) Take 1 capsule by mouth Daily. HOLDING FOR SURGERY   9/17/2021   • FOLIC ACID PO Take 1 capsule by mouth Daily. HOLDING FOR SURGERY   9/17/2021   • MILK THISTLE PO Take 1 capsule by mouth 2 (Two) Times a Day. HOLDING FOR SURGERY   9/17/2021   • Naproxen Sodium 220 MG capsule Take 220 mg by mouth Daily. HOLDING FOR SURGERY   9/17/2021   • Omega-3 Fatty Acids (FISH OIL PO) Take 1 tablet by mouth 2 (Two) Times a Day. HOLDING FOR SURGERY   9/17/2021   • Probiotic Product (PROBIOTIC ADVANCED PO) Take 1 capsule by mouth Daily.   9/21/2021   • vitamin B-12 (CYANOCOBALAMIN) 1000 MCG tablet Take 1 tablet by mouth Daily. HOLDING FOR SURGERY   9/17/2021   • vitamin B-6 (PYRIDOXINE) 50 MG tablet Take 50 mg by mouth Daily. HOLDING FOR SURGERY   9/17/2021     Current Meds:     Current Facility-Administered Medications:   •  ceFAZolin in dextrose (ANCEF) IVPB solution 2 g, 2 g, Intravenous, Once, Tamy Carrero MD  •  fentaNYL citrate (PF) (SUBLIMAZE) injection 50 mcg, 50 mcg, Intravenous, Q10 Min PRN, Jostin Ngo MD  •  lactated ringers infusion, 9 mL/hr, Intravenous, Continuous, Jostin Ngo MD, Last Rate: 9 mL/hr at 09/23/21 0650, 9 mL/hr at 09/23/21 0650  •  lidocaine PF 1% (XYLOCAINE) injection 0.5 mL, 0.5 mL, Injection, Once PRN, Jostin Ngo MD  •  midazolam (VERSED) injection 0.5 mg, 0.5 mg, Intravenous, Q10 Min PRN, Jostin Ngo MD  •  sodium chloride 0.9 % flush 10 mL, 10 mL, Intravenous, PRN, Tamy Carrero MD  •  sodium chloride 0.9 % flush 3 mL, 3 mL, Intravenous,  "Q12H, Jostin Ngo MD  •  sodium chloride 0.9 % flush 3 mL, 3 mL, Intravenous, Q12H, Tamy Carrero MD  •  sodium chloride 0.9 % flush 3-10 mL, 3-10 mL, Intravenous, PRN, Jostin Ngo MD  Allergies:  Allergies   Allergen Reactions   • Diphenhydramine Hcl Other (See Comments)     \"it wakes me up-I can't sleep.\"   • Penicillins Itching and Rash   • Red Dye Itching and Rash     Immunizations:  Immunization History   Administered Date(s) Administered   • COVID-19 (PFIZER) 02/23/2021, 03/13/2021, 09/08/2021   • FLUAD TRI 65YR+ 10/02/2018, 10/01/2019   • FluMist 2-49yrs 10/17/2013, 10/14/2015, 10/01/2016   • Fluad Quad 65+ 10/13/2020   • Fluzone High Dose =>65 Years (Vaxcare ONLY) 10/27/2017, 10/02/2018   • INFLUENZA SPLIT TRI 10/18/2017   • Influenza, Unspecified 10/01/2020   • Pneumococcal Conjugate 13-Valent (PCV13) 02/22/2017, 10/13/2020   • Pneumococcal Polysaccharide (PPSV23) 10/03/2012, 10/18/2017, 02/22/2018   • Pneumococcal, Unspecified 01/01/2008, 01/01/2009   • Shingrix 01/29/2020, 06/09/2020   • Td 02/02/2002   • Tdap 02/04/2016   • Zostavax 06/01/2013   • flucelvax quad pfs =>4 YRS 10/01/2018     Social History:   Social History     Tobacco Use   • Smoking status: Never Smoker   • Smokeless tobacco: Never Used   Substance Use Topics   • Alcohol use: Yes     Comment: OCCASSIONALLY      Family History:  Family History   Problem Relation Age of Onset   • Stroke Mother    • Alzheimer's disease Mother    • Prostate cancer Father    • Heart disease Father    • Hypertension Father    • Mitral valve prolapse Father    • COPD Brother    • Diabetes Brother    • Arthritis Brother    • Mitral valve prolapse Brother    • Cancer Brother         bladder   • Heart disease Sister    • Hypertension Sister    • Colon cancer Sister    • Hypothyroidism Sister    • Dementia Sister    • Rheum arthritis Maternal Grandfather    • Alcohol abuse Maternal Grandfather    • Ovarian cancer Maternal Aunt    • Breast " "cancer Neg Hx    • Uterine cancer Neg Hx    • Malig Hyperthermia Neg Hx         Review of Systems  Constitutional:  Denies fever or chills   Eyes:  Denies change in visual acuity   HEENT:  Denies nasal congestion or sore throat   Respiratory:  Denies cough or shortness of breath   Cardiovascular:  Denies chest pain or edema   GI:  Denies abdominal pain, nausea, vomiting, bloody stools or diarrhea   :  Denies dysuria   Musculoskeletal:  Denies back pain or joint pain   Integument:  Denies rash   Neurologic:  Denies headache, focal weakness or sensory changes   Endocrine:  Denies polyuria or polydipsia   Lymphatic:  Denies swollen glands   Psychiatric:  Denies depression or anxiety       Objective:  tMax 24 hrs: Temp (24hrs), Av.2 °F (36.8 °C), Min:98.2 °F (36.8 °C), Max:98.2 °F (36.8 °C)    Vitals Ranges:   Temp:  [98.2 °F (36.8 °C)] 98.2 °F (36.8 °C)  Heart Rate:  [60] 60  Resp:  [16] 16  BP: (122)/(71) 122/71    Exam:  Vital Signs: /71 (BP Location: Right arm, Patient Position: Lying)   Pulse 60   Temp 98.2 °F (36.8 °C) (Oral)   Resp 16   Ht 172.7 cm (68\")   Wt 68.5 kg (151 lb 1.6 oz)   LMP  (LMP Unknown)   SpO2 98%   BMI 22.97 kg/m²   Constitutional:  Well developed, well nourished, no acute distress, non-toxic appearance    GI:  Soft, nondistended, normal bowel sounds, nontender, no organomegaly, no mass, no rebound, no guarding   :  No costovertebral angle tenderness   Musculoskeletal:  No edema, no tenderness, no deformities. 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

## 2021-09-23 NOTE — ANESTHESIA PROCEDURE NOTES
Airway  Urgency: elective    Date/Time: 9/23/2021 7:45 AM  Airway not difficult    General Information and Staff    Patient location during procedure: OR  Anesthesiologist: Jostin Ngo MD  CRNA: Reva Huynh CRNA    Indications and Patient Condition  Indications for airway management: airway protection    Preoxygenated: yes  MILS maintained throughout  Mask difficulty assessment: 1 - vent by mask    Final Airway Details  Final airway type: endotracheal airway      Successful airway: ETT  Cuffed: yes   Successful intubation technique: direct laryngoscopy  Facilitating devices/methods: intubating stylet  Endotracheal tube insertion site: oral  Blade: Lobo  Blade size: 2  ETT size (mm): 7.0  Cormack-Lehane Classification: grade IIa - partial view of glottis  Placement verified by: chest auscultation and capnometry   Measured from: lips  Number of attempts at approach: 1  Assessment: lips, teeth, and gum same as pre-op and atraumatic intubation    Additional Comments  Atraumatic, Secured after verification of placement

## 2021-09-23 NOTE — ANESTHESIA PREPROCEDURE EVALUATION
Anesthesia Evaluation     Patient summary reviewed and Nursing notes reviewed                Airway   Mallampati: II  TM distance: >3 FB  Neck ROM: full  No difficulty expected  Dental      Pulmonary    (+) shortness of breath,   Cardiovascular - negative cardio ROS    ECG reviewed  Rhythm: regular  Rate: normal        Neuro/Psych  (+) headaches, psychiatric history Anxiety,     GI/Hepatic/Renal/Endo    (+)  GERD,      Musculoskeletal     Abdominal    Substance History - negative use     OB/GYN negative ob/gyn ROS         Other   arthritis,                      Anesthesia Plan    ASA 2     general   (I have reviewed the patient's history with the patient and the chart, including all pertinent laboratory results and imaging. I have explained the risks of anesthesia including but not limited to dental damage, corneal abrasion, nerve injury, MI, stroke, and death. Questions asked and answered. Anesthetic plan discussed with patient and team as indicated. Patient expressed understanding of the above.  )  intravenous induction     Anesthetic plan, all risks, benefits, and alternatives have been provided, discussed and informed consent has been obtained with: patient.

## 2021-09-23 NOTE — ANESTHESIA POSTPROCEDURE EVALUATION
Patient: Melany Lloyd    Procedure Summary     Date: 09/23/21 Room / Location: [facility] OR [address] [place] [address] MAIN OR    Anesthesia Start: 0733 Anesthesia Stop: 1556    Procedure: Laparoscopic uterosacral ligament colpopexy sacral colpopexy Laparoscopic paravaginal repair Laparoscopic abdominal enterocele repair Pubovaginal sling Posterior colporrhaphy Anterior colporrhaphy Extraperitoneal colpopexy sacrospinous ligament fixation Insertion of vaginal graft Cystourethroscopy (N/A Abdomen) Diagnosis:       Prolapse of vaginal vault after hysterectomy      Cystocele, midline      Cystocele, lateral      Vaginal enterocele, congenital or acquired      Rectocele      Female stress incontinence      (Prolapse of vaginal vault after hysterectomy [N99.3])      (Cystocele, midline [N81.11])      (Cystocele, lateral [N81.12])      (Vaginal enterocele, congenital or acquired [N81.5])      (Rectocele [N81.6])      (Female stress incontinence [N39.3])    Surgeons: Tamy Carrero MD Provider: Jostin Ngo MD    Anesthesia Type: general ASA Status: 2          Anesthesia Type: general    Vitals  Vitals Value Taken Time   /77 09/23/21 1716   Temp 36.4 °C (97.5 °F) 09/23/21 1551   Pulse 67 09/23/21 1728   Resp 16 09/23/21 1715   SpO2 96 % 09/23/21 1728   Vitals shown include unvalidated device data.        Post Anesthesia Care and Evaluation    Patient location during evaluation: bedside  Patient participation: complete - patient participated  Level of consciousness: awake  Pain management: adequate  Airway patency: patent  Anesthetic complications: No anesthetic complications  PONV Status: controlled  Cardiovascular status: acceptable  Respiratory status: acceptable  Hydration status: acceptable    Comments: --------------------            09/23/21               1816     --------------------   BP:                  Pulse:      72       Resp:                Temp:                SpO2:      97%       --------------------

## 2021-09-27 LAB
CYTO UR: NORMAL
LAB AP CASE REPORT: NORMAL
LAB AP DIAGNOSIS COMMENT: NORMAL
PATH REPORT.FINAL DX SPEC: NORMAL
PATH REPORT.GROSS SPEC: NORMAL

## 2021-11-01 ENCOUNTER — TRANSCRIBE ORDERS (OUTPATIENT)
Dept: ADMINISTRATIVE | Facility: HOSPITAL | Age: 71
End: 2021-11-01

## 2021-11-01 DIAGNOSIS — Z12.31 ENCOUNTER FOR SCREENING MAMMOGRAM FOR BREAST CANCER: Primary | ICD-10-CM

## 2021-12-13 ENCOUNTER — OFFICE VISIT (OUTPATIENT)
Dept: GASTROENTEROLOGY | Facility: CLINIC | Age: 71
End: 2021-12-13

## 2021-12-13 VITALS
SYSTOLIC BLOOD PRESSURE: 142 MMHG | DIASTOLIC BLOOD PRESSURE: 75 MMHG | WEIGHT: 150.6 LBS | BODY MASS INDEX: 22.82 KG/M2 | HEIGHT: 68 IN

## 2021-12-13 DIAGNOSIS — Z79.1 NSAID LONG-TERM USE: ICD-10-CM

## 2021-12-13 DIAGNOSIS — R74.8 ELEVATED LIVER ENZYMES: Primary | Chronic | ICD-10-CM

## 2021-12-13 DIAGNOSIS — M32.9 SYSTEMIC LUPUS ERYTHEMATOSUS, UNSPECIFIED SLE TYPE, UNSPECIFIED ORGAN INVOLVEMENT STATUS (HCC): Chronic | ICD-10-CM

## 2021-12-13 DIAGNOSIS — R53.83 OTHER FATIGUE: ICD-10-CM

## 2021-12-13 DIAGNOSIS — Z80.0 FAMILY HISTORY OF COLON CANCER: ICD-10-CM

## 2021-12-13 PROCEDURE — 99214 OFFICE O/P EST MOD 30 MIN: CPT | Performed by: INTERNAL MEDICINE

## 2021-12-13 RX ORDER — ESTRADIOL 0.1 MG/G
2 CREAM VAGINAL DAILY
COMMUNITY

## 2021-12-13 NOTE — PROGRESS NOTES
Chief Complaint   Patient presents with   • Elevated Hepatic Enzymes       Subjective     HPI    Melany Lloyd is a 71 y.o. female with a past medical history noted below who presents for follow-up of elevated liver enzymes, family history of colon cancer.  She additionally has a history of lupus and is followed by rheumatology.    She underwent liver biopsy in August that showed normal liver tissue.      She completed surveillance colonoscopy in March of this year that was normal except for diverticulosis and internal hemorrhoids.    She had pelvic floor reconstruction in September and then has some issues following that, now getting better    Feels fatigued, sleeping well but just no energy    Rare ETOH    Taking NSAIDs irregularly and not daily as she had been doing      Today's visit was in the office.  Both the patient and I were wearing face masks and proper hand hygiene was performed before and after the physical exam.           Current Outpatient Medications:   •  Cholecalciferol (VITAMIN D) 2000 UNITS capsule, Take 1,000 Units by mouth Daily. HOLDING FOR SURGERY, Disp: , Rfl:   •  Diclofenac Sodium (Voltaren) 1 % gel gel, Apply 2 g topically to the appropriate area as directed Every Night. HOLD FOR SURGERY, Disp: , Rfl:   •  estradiol (ESTRACE) 0.1 MG/GM vaginal cream, Insert 2 g into the vagina Daily., Disp: , Rfl:   •  FIBER PO, Take 1 capsule by mouth Daily. GUMMIES, Disp: , Rfl:   •  Flaxseed, Linseed, (FLAX SEED OIL PO), Take 1 capsule by mouth Daily. HOLDING FOR SURGERY, Disp: , Rfl:   •  FOLIC ACID PO, Take 1 capsule by mouth Daily. HOLDING FOR SURGERY, Disp: , Rfl:   •  MILK THISTLE PO, Take 1 capsule by mouth 2 (Two) Times a Day. HOLDING FOR SURGERY, Disp: , Rfl:   •  Naproxen Sodium 220 MG capsule, Take 220 mg by mouth Daily. HOLDING FOR SURGERY, Disp: , Rfl:   •  Omega-3 Fatty Acids (FISH OIL PO), Take 1 tablet by mouth 2 (Two) Times a Day. HOLDING FOR SURGERY, Disp: , Rfl:   •  Probiotic  Product (PROBIOTIC ADVANCED PO), Take 1 capsule by mouth Daily., Disp: , Rfl:   •  vitamin B-12 (CYANOCOBALAMIN) 1000 MCG tablet, Take 1 tablet by mouth Daily. HOLDING FOR SURGERY, Disp: , Rfl:   •  vitamin B-6 (PYRIDOXINE) 50 MG tablet, Take 50 mg by mouth Daily. HOLDING FOR SURGERY, Disp: , Rfl:       Objective     Vitals:    12/13/21 1005   BP: 142/75         12/13/21  1005   Weight: 68.3 kg (150 lb 9.6 oz)     Body mass index is 22.9 kg/m².    Physical Exam  Constitutional:       Appearance: Normal appearance.   Pulmonary:      Effort: Pulmonary effort is normal.   Neurological:      Mental Status: She is alert and oriented to person, place, and time.   Psychiatric:         Mood and Affect: Mood normal.         Behavior: Behavior normal.             WBC   Date Value Ref Range Status   09/20/2021 5.41 3.40 - 10.80 10*3/mm3 Final   07/13/2021 4.46 3.40 - 10.80 10*3/mm3 Final     RBC   Date Value Ref Range Status   09/20/2021 4.54 3.77 - 5.28 10*6/mm3 Final   07/13/2021 4.51 3.77 - 5.28 10*6/mm3 Final     Hemoglobin   Date Value Ref Range Status   09/20/2021 13.7 12.0 - 15.9 g/dL Final     Hematocrit   Date Value Ref Range Status   09/20/2021 42.6 34.0 - 46.6 % Final     MCV   Date Value Ref Range Status   09/20/2021 93.8 79.0 - 97.0 fL Final     MCH   Date Value Ref Range Status   09/20/2021 30.2 26.6 - 33.0 pg Final     MCHC   Date Value Ref Range Status   09/20/2021 32.2 31.5 - 35.7 g/dL Final     RDW   Date Value Ref Range Status   09/20/2021 12.7 12.3 - 15.4 % Final     RDW-SD   Date Value Ref Range Status   09/20/2021 44.1 37.0 - 54.0 fl Final     MPV   Date Value Ref Range Status   09/20/2021 9.4 6.0 - 12.0 fL Final     Platelets   Date Value Ref Range Status   09/20/2021 334 140 - 450 10*3/mm3 Final     Neutrophil Rel %   Date Value Ref Range Status   07/13/2021 54.8 42.7 - 76.0 % Final     Lymphocyte Rel %   Date Value Ref Range Status   07/13/2021 30.5 19.6 - 45.3 % Final     Monocyte Rel %   Date Value  Ref Range Status   07/13/2021 10.5 5.0 - 12.0 % Final     Eosinophil Rel %   Date Value Ref Range Status   07/13/2021 2.7 0.3 - 6.2 % Final     Basophil Rel %   Date Value Ref Range Status   07/13/2021 1.3 0.0 - 1.5 % Final     Neutrophils Absolute   Date Value Ref Range Status   07/13/2021 2.44 1.70 - 7.00 10*3/mm3 Final     Lymphocytes Absolute   Date Value Ref Range Status   07/13/2021 1.36 0.70 - 3.10 10*3/mm3 Final     Monocytes Absolute   Date Value Ref Range Status   07/13/2021 0.47 0.10 - 0.90 10*3/mm3 Final     Eosinophils Absolute   Date Value Ref Range Status   07/13/2021 0.12 0.00 - 0.40 10*3/mm3 Final     Basophils Absolute   Date Value Ref Range Status   07/13/2021 0.06 0.00 - 0.20 10*3/mm3 Final     nRBC   Date Value Ref Range Status   07/13/2021 0.0 0.0 - 0.2 /100 WBC Final       Lab Results   Component Value Date    GLUCOSE 105 (H) 09/20/2021    BUN 20 09/20/2021    CREATININE 0.58 09/20/2021    EGFRIFNONA 103 09/20/2021    EGFRIFAFRI 130 07/09/2021    BCR 34.5 (H) 09/20/2021    CO2 26.5 09/20/2021    CALCIUM 9.5 09/20/2021    PROTENTOTREF 6.7 07/09/2021    ALBUMIN 4.30 09/20/2021    LABIL2 1.9 07/09/2021    AST 55 (H) 09/20/2021    ALT 36 (H) 09/20/2021         Imaging Results (Last 7 Days)     ** No results found for the last 168 hours. **          I personally reviewed data as detailed below:     The labs listed above, liver biopsy path    Office notes from: 3/17/21 rheumatology note    Endoscopy procedures/notes from: 3/5/21      No notes on file    Assessment/Plan    1.  Elevated liver enzymes:  Chronic issues, normal serologic workup, normal liver biopsy.  Perhaps this is related to her lupus or perhaps this is just her baseline    2. Fatigue: ? Post surgical    3. SLE: stable    4. NSAID use: she has started to use as needed    Plan  Celiac labs today  CBC, CMP, gamma GGT  Continue minimal alcohol use, only as needed NSAID use  Sideration of hepatology referral if her liver enzymes  worsen      Diagnoses and all orders for this visit:    1. Elevated liver enzymes (Primary)  -     Gliadin Antibody, IgG  -     IgA  -     Tissue Transglutaminase, IgA  -     Tissue Transglutaminase, IgG  -     Comprehensive Metabolic Panel  -     CBC & Differential  -     Gamma GT    2. Systemic lupus erythematosus, unspecified SLE type, unspecified organ involvement status (HCC)    3. NSAID long-term use    4. Family history of colon cancer    5. Other fatigue        I have discussed the above plan with the patient.  They verbalize understanding and are in agreement with the plan.  They have been advised to contact the office for any questions, concerns, or changes related to their health.    Dictated utilizing Dragon dictation

## 2021-12-14 LAB
ALBUMIN SERPL-MCNC: 4.4 G/DL (ref 3.8–4.8)
ALBUMIN/GLOB SERPL: 1.7 {RATIO} (ref 1.2–2.2)
ALP SERPL-CCNC: 53 IU/L (ref 44–121)
ALT SERPL-CCNC: 33 IU/L (ref 0–32)
AST SERPL-CCNC: 50 IU/L (ref 0–40)
BASOPHILS # BLD AUTO: 0.1 X10E3/UL (ref 0–0.2)
BASOPHILS NFR BLD AUTO: 1 %
BILIRUB SERPL-MCNC: 0.2 MG/DL (ref 0–1.2)
BUN SERPL-MCNC: 18 MG/DL (ref 8–27)
BUN/CREAT SERPL: 28 (ref 12–28)
CALCIUM SERPL-MCNC: 10 MG/DL (ref 8.7–10.3)
CHLORIDE SERPL-SCNC: 100 MMOL/L (ref 96–106)
CO2 SERPL-SCNC: 27 MMOL/L (ref 20–29)
CREAT SERPL-MCNC: 0.65 MG/DL (ref 0.57–1)
EOSINOPHIL # BLD AUTO: 0.1 X10E3/UL (ref 0–0.4)
EOSINOPHIL NFR BLD AUTO: 2 %
ERYTHROCYTE [DISTWIDTH] IN BLOOD BY AUTOMATED COUNT: 12.8 % (ref 11.7–15.4)
GGT SERPL-CCNC: 18 IU/L (ref 0–60)
GLIADIN PEPTIDE IGG SER-ACNC: 3 UNITS (ref 0–19)
GLOBULIN SER CALC-MCNC: 2.6 G/DL (ref 1.5–4.5)
GLUCOSE SERPL-MCNC: 89 MG/DL (ref 65–99)
HCT VFR BLD AUTO: 40.9 % (ref 34–46.6)
HGB BLD-MCNC: 13.8 G/DL (ref 11.1–15.9)
IGA SERPL-MCNC: 120 MG/DL (ref 87–352)
IMM GRANULOCYTES # BLD AUTO: 0 X10E3/UL (ref 0–0.1)
IMM GRANULOCYTES NFR BLD AUTO: 0 %
LYMPHOCYTES # BLD AUTO: 1.3 X10E3/UL (ref 0.7–3.1)
LYMPHOCYTES NFR BLD AUTO: 19 %
MCH RBC QN AUTO: 31.2 PG (ref 26.6–33)
MCHC RBC AUTO-ENTMCNC: 33.7 G/DL (ref 31.5–35.7)
MCV RBC AUTO: 92 FL (ref 79–97)
MONOCYTES # BLD AUTO: 0.5 X10E3/UL (ref 0.1–0.9)
MONOCYTES NFR BLD AUTO: 7 %
NEUTROPHILS # BLD AUTO: 4.8 X10E3/UL (ref 1.4–7)
NEUTROPHILS NFR BLD AUTO: 71 %
PLATELET # BLD AUTO: 380 X10E3/UL (ref 150–450)
POTASSIUM SERPL-SCNC: 5.7 MMOL/L (ref 3.5–5.2)
PROT SERPL-MCNC: 7 G/DL (ref 6–8.5)
RBC # BLD AUTO: 4.43 X10E6/UL (ref 3.77–5.28)
SODIUM SERPL-SCNC: 140 MMOL/L (ref 134–144)
TTG IGA SER-ACNC: <2 U/ML (ref 0–3)
TTG IGG SER-ACNC: 5 U/ML (ref 0–5)
WBC # BLD AUTO: 6.7 X10E3/UL (ref 3.4–10.8)

## 2021-12-15 ENCOUNTER — TELEPHONE (OUTPATIENT)
Dept: GASTROENTEROLOGY | Facility: CLINIC | Age: 71
End: 2021-12-15

## 2021-12-15 DIAGNOSIS — E87.5 HYPERKALEMIA: Primary | ICD-10-CM

## 2021-12-15 NOTE — TELEPHONE ENCOUNTER
----- Message from Lorraine Mejia MD sent at 12/15/2021  7:51 AM EST -----  Her labs show that her AST and ALT remain elevated to a similar degree.Her potassium was elevated at 5.7.  I am unclear why this would be high as she is not on any medications that would lead to this..  I would like to recheck this level next week.  I have placed order.Celiac studies are negative

## 2021-12-15 NOTE — PROGRESS NOTES
Her labs show that her AST and ALT remain elevated to a similar degree.Her potassium was elevated at 5.7.  I am unclear why this would be high as she is not on any medications that would lead to this..  I would like to recheck this level next week.  I have placed order.Celiac studies are negative

## 2021-12-15 NOTE — TELEPHONE ENCOUNTER
Called home number and number continues to ring.     Called mobile number and advised of Dr Mejia note. Verb understanding and states she will get labs done next week at [facility].

## 2021-12-21 ENCOUNTER — TELEPHONE (OUTPATIENT)
Dept: FAMILY MEDICINE CLINIC | Facility: CLINIC | Age: 71
End: 2021-12-21

## 2021-12-21 ENCOUNTER — TELEPHONE (OUTPATIENT)
Dept: GASTROENTEROLOGY | Facility: CLINIC | Age: 71
End: 2021-12-21

## 2021-12-21 DIAGNOSIS — E87.5 SERUM POTASSIUM ELEVATED: Primary | ICD-10-CM

## 2021-12-21 DIAGNOSIS — E87.5 HYPERKALEMIA: Primary | ICD-10-CM

## 2021-12-21 NOTE — TELEPHONE ENCOUNTER
----- Message from Lorraine Mejia MD sent at 12/21/2021  1:08 PM EST -----  Her potassium level is better than last week though still on the slightly elevated side.  I am going to notify Dr. Kovacs.  It does not appear that she is on any medications that could be causing this but I think it is worth further evaluation.  If she is taking any supplement that contains potassium, she should stop that, stop the alleve if she has been taking that  She should be drinking plenty of fluids. WIll go ahead and order an EKG as well-- pls have her schedule this for today/tomorrow    CC'ing Dr Kovacs on this message

## 2021-12-21 NOTE — TELEPHONE ENCOUNTER
Dr Olson contacted me about her chronically elevated K+ level. I placed an Endocrine referral. Please let pt know and make sure she goes.

## 2021-12-21 NOTE — TELEPHONE ENCOUNTER
Whitney contacted me about her chronically elevated K+ level. I placed an Endocrine referral. Please let pt know and make sure she go. PT TOLD . PER DR WEBB

## 2021-12-22 ENCOUNTER — HOSPITAL ENCOUNTER (OUTPATIENT)
Dept: CARDIOLOGY | Facility: HOSPITAL | Age: 71
Discharge: HOME | End: 2021-12-22
Admitting: INTERNAL MEDICINE

## 2021-12-22 DIAGNOSIS — E87.5 SERUM POTASSIUM ELEVATED: ICD-10-CM

## 2021-12-22 LAB — QT INTERVAL: 404 MS

## 2021-12-22 PROCEDURE — 93010 ELECTROCARDIOGRAM REPORT: CPT | Performed by: INTERNAL MEDICINE

## 2021-12-22 PROCEDURE — 93005 ELECTROCARDIOGRAM TRACING: CPT | Performed by: INTERNAL MEDICINE

## 2021-12-29 ENCOUNTER — TELEPHONE (OUTPATIENT)
Dept: GASTROENTEROLOGY | Facility: CLINIC | Age: 71
End: 2021-12-29

## 2021-12-29 NOTE — TELEPHONE ENCOUNTER
----- Message from Lorraine Mejia MD sent at 12/22/2021  5:10 PM EST -----  EKG is normalDrHuang Kovacs is aware of the issue with the potassium and will be following her up.   [General Appearance - Well Developed] : well developed [General Appearance - Well Nourished] : well nourished [Normal Appearance] : normal appearance [Well Groomed] : well groomed [General Appearance - In No Acute Distress] : no acute distress [Abdomen Soft] : soft [Abdomen Tenderness] : non-tender [Costovertebral Angle Tenderness] : no ~M costovertebral angle tenderness [Skin Color & Pigmentation] : normal skin color and pigmentation [Heart Rate And Rhythm] : Heart rate and rhythm were normal [Edema] : no peripheral edema [] : no respiratory distress [Respiration, Rhythm And Depth] : normal respiratory rhythm and effort [Exaggerated Use Of Accessory Muscles For Inspiration] : no accessory muscle use [Oriented To Time, Place, And Person] : oriented to person, place, and time [Affect] : the affect was normal [Mood] : the mood was normal [Not Anxious] : not anxious [Normal Station and Gait] : the gait and station were normal for the patient's age [No Focal Deficits] : no focal deficits [No Palpable Adenopathy] : no palpable adenopathy [Cervical Lymph Nodes Enlarged Posterior Bilaterally] : posterior cervical [Cervical Lymph Nodes Enlarged Anterior Bilaterally] : anterior cervical [Supraclavicular Lymph Nodes Enlarged Bilaterally] : supraclavicular [FreeTextEntry1] : No submandibular gland tenderness.\par

## 2022-01-11 ENCOUNTER — HOSPITAL ENCOUNTER (OUTPATIENT)
Dept: MAMMOGRAPHY | Facility: HOSPITAL | Age: 72
Discharge: HOME | End: 2022-01-11
Admitting: FAMILY MEDICINE

## 2022-01-11 DIAGNOSIS — Z12.31 ENCOUNTER FOR SCREENING MAMMOGRAM FOR BREAST CANCER: ICD-10-CM

## 2022-01-11 PROCEDURE — 77063 BREAST TOMOSYNTHESIS BI: CPT

## 2022-01-11 PROCEDURE — 77067 SCR MAMMO BI INCL CAD: CPT

## 2022-02-15 ENCOUNTER — OFFICE VISIT (OUTPATIENT)
Dept: ORTHOPEDIC SURGERY | Facility: CLINIC | Age: 72
End: 2022-02-15

## 2022-02-15 DIAGNOSIS — M17.12 PRIMARY OSTEOARTHRITIS OF LEFT KNEE: ICD-10-CM

## 2022-02-15 DIAGNOSIS — R52 PAIN: Primary | ICD-10-CM

## 2022-02-15 PROCEDURE — 20610 DRAIN/INJ JOINT/BURSA W/O US: CPT | Performed by: NURSE PRACTITIONER

## 2022-02-15 PROCEDURE — 99203 OFFICE O/P NEW LOW 30 MIN: CPT | Performed by: NURSE PRACTITIONER

## 2022-02-15 PROCEDURE — 73562 X-RAY EXAM OF KNEE 3: CPT | Performed by: NURSE PRACTITIONER

## 2022-02-15 RX ORDER — METHYLPREDNISOLONE ACETATE 80 MG/ML
80 INJECTION, SUSPENSION INTRA-ARTICULAR; INTRALESIONAL; INTRAMUSCULAR; SOFT TISSUE
Status: COMPLETED | OUTPATIENT
Start: 2022-02-15 | End: 2022-02-15

## 2022-02-15 RX ORDER — DOXYCYCLINE HYCLATE 100 MG
TABLET ORAL
COMMUNITY
Start: 2021-12-20 | End: 2022-06-14

## 2022-02-15 RX ADMIN — METHYLPREDNISOLONE ACETATE 80 MG: 80 INJECTION, SUSPENSION INTRA-ARTICULAR; INTRALESIONAL; INTRAMUSCULAR; SOFT TISSUE at 13:48

## 2022-02-15 NOTE — PROGRESS NOTES
"Patient: Melany Lolyd  YOB: 1950 71 y.o. female  Medical Record Number: [id number]    Chief Complaints:   Chief Complaint   Patient presents with   • Left Knee - new problem       History of Present Illness:Melany Lloyd is a 71 y.o. female who presents with complaints of having left knee pain that is now chronic in nature.  Patient states she has been dealing with this issue for around 6 months.  Patient states the pain is located to the lateral portion of her knee as well as just below the kneecap.  Patient describes her pain as an achy pain of about a 1 or 2 out of 10 on a consistent basis however with any type of resistance to her knee and jumps up to a 6 or 7 out of 10.  Patient denies any known injury to her knee.  Patient did report that on one episode she had her knee to lock in which she had to forcefully push through to straighten out her knee.  Patient reports that her knee pain is worsened with resistance, prolonged walking standing going up and down stairs; she gets periods of relief by taking rest.  Patient denies any signs or symptoms of infection.  She is without any other significant complaints today.    Allergies:   Allergies   Allergen Reactions   • Diphenhydramine Hcl Other (See Comments)     \"it wakes me up-I can't sleep.\"   • Penicillins Itching and Rash   • Red Dye Itching and Rash       Medications:   Current Outpatient Medications   Medication Sig Dispense Refill   • Cholecalciferol (VITAMIN D) 2000 UNITS capsule Take 1,000 Units by mouth Daily. HOLDING FOR SURGERY     • doxycycline (VIBRAMYICN) 100 MG tablet      • estradiol (ESTRACE) 0.1 MG/GM vaginal cream Insert 2 g into the vagina Daily.     • FIBER PO Take 1 capsule by mouth Daily. GUMMIES     • Flaxseed, Linseed, (FLAX SEED OIL PO) Take 1 capsule by mouth Daily. HOLDING FOR SURGERY     • FOLIC ACID PO Take 1 capsule by mouth Daily. HOLDING FOR SURGERY     • Liniments (BLUE-EMU SUPER STRENGTH EX)      • MILK THISTLE " PO Take 1 capsule by mouth 2 (Two) Times a Day. HOLDING FOR SURGERY     • Omega-3 Fatty Acids (FISH OIL PO) Take 1 tablet by mouth 2 (Two) Times a Day. HOLDING FOR SURGERY     • Probiotic Product (PROBIOTIC ADVANCED PO) Take 1 capsule by mouth Daily.     • vitamin B-12 (CYANOCOBALAMIN) 1000 MCG tablet Take 1 tablet by mouth Daily. HOLDING FOR SURGERY     • vitamin B-6 (PYRIDOXINE) 50 MG tablet Take 50 mg by mouth Daily. HOLDING FOR SURGERY       No current facility-administered medications for this visit.         The following portions of the patient's history were reviewed and updated as appropriate: allergies, current medications, past family history, past medical history, past social history, past surgical history and problem list.    Review of Systems:   A 14 point review of systems was performed. All systems negative except pertinent positives/negative listed in HPI above    Physical Exam:   There were no vitals filed for this visit.    General: A and O x 3, ASA, NAD    SCLERA:    Normal    DENTITION:   Normal     Knee:  left    ALIGNMENT:     Valgus      GAIT:    Antalgic    SKIN:    No abnormality    RANGE OF MOTION:   5  - 130   DEG    STRENGTH:   4  / 5    LIGAMENTS:    No varus / valgus instability.   Negative  Lachman.    MENISCUS:     Negative   Shira       DISTAL PULSES:    Paplable    DISTAL SENSATION :   Intact    LYMPHATICS:     No   lymphadenopathy    OTHER:          - Positive   effusion      + Crepitance with ROM       Radiology:  Xrays 3views (ap,lateral, sunrise) were ordered and reviewed for evaluation of knee pain demonstrating advanced valgus osteoarthritis with bone on bone articulation, subchondral cysts, and periarticular osteophytes.  Patient also has medial facet patellofemoral osteoarthritis.  When in comparison to previous x-rays patient does demonstrate progressive arthritic pattern changes.    Assessment/Plan: Primary osteoarthritis of left knee    Treatment options as well as  imaging results were discussed in detail with the patient.  At this point in time organ to proceed forward with conservative measures.  We are going to  give the patient a prescription for a medial heel wedge as well as an intra-articular joint injection.  Patient is a PTA and will do home exercises.  We will have the patient follow-up with us on an as-needed basis.    Large Joint Arthrocentesis: L knee  Date/Time: 2/15/2022 1:48 PM  Consent given by: patient  Site marked: site marked  Timeout: Immediately prior to procedure a time out was called to verify the correct patient, procedure, equipment, support staff and site/side marked as required   Supporting Documentation  Indications: pain and joint swelling   Procedure Details  Location: knee - L knee  Preparation: Patient was prepped and draped in the usual sterile fashion  Needle size: 22 G  Approach: anterolateral  Medications administered: 80 mg methylPREDNISolone acetate 80 MG/ML; 2 mL lidocaine (cardiac)  Patient tolerance: patient tolerated the procedure well with no immediate complications            GEOVANNI More  2/15/2022

## 2022-04-12 ENCOUNTER — TRANSCRIBE ORDERS (OUTPATIENT)
Dept: ADMINISTRATIVE | Facility: HOSPITAL | Age: 72
End: 2022-04-12

## 2022-04-12 DIAGNOSIS — R80.9 PROTEINURIA, UNSPECIFIED TYPE: Primary | ICD-10-CM

## 2022-05-09 ENCOUNTER — HOSPITAL ENCOUNTER (OUTPATIENT)
Dept: ULTRASOUND IMAGING | Facility: HOSPITAL | Age: 72
Discharge: HOME | End: 2022-05-09
Admitting: INTERNAL MEDICINE

## 2022-05-09 DIAGNOSIS — R80.9 PROTEINURIA, UNSPECIFIED TYPE: ICD-10-CM

## 2022-05-09 PROCEDURE — 76775 US EXAM ABDO BACK WALL LIM: CPT

## 2022-06-14 ENCOUNTER — OFFICE VISIT (OUTPATIENT)
Dept: GASTROENTEROLOGY | Facility: CLINIC | Age: 72
End: 2022-06-14

## 2022-06-14 ENCOUNTER — TELEPHONE (OUTPATIENT)
Dept: GASTROENTEROLOGY | Facility: CLINIC | Age: 72
End: 2022-06-14

## 2022-06-14 VITALS
WEIGHT: 152 LBS | TEMPERATURE: 97.4 F | BODY MASS INDEX: 23.04 KG/M2 | SYSTOLIC BLOOD PRESSURE: 133 MMHG | HEIGHT: 68 IN | HEART RATE: 69 BPM | DIASTOLIC BLOOD PRESSURE: 74 MMHG

## 2022-06-14 DIAGNOSIS — R15.2 FECAL URGENCY: ICD-10-CM

## 2022-06-14 DIAGNOSIS — M32.9 SYSTEMIC LUPUS ERYTHEMATOSUS, UNSPECIFIED SLE TYPE, UNSPECIFIED ORGAN INVOLVEMENT STATUS: ICD-10-CM

## 2022-06-14 DIAGNOSIS — R74.8 ELEVATED LIVER ENZYMES: Primary | Chronic | ICD-10-CM

## 2022-06-14 PROCEDURE — 99214 OFFICE O/P EST MOD 30 MIN: CPT | Performed by: INTERNAL MEDICINE

## 2022-06-14 RX ORDER — FUROSEMIDE 20 MG/1
TABLET ORAL
COMMUNITY
Start: 2022-06-06

## 2022-06-14 NOTE — PROGRESS NOTES
Chief Complaint   Patient presents with   • Elevated Hepatic Enzymes   • Diarrhea       Subjective     HPI    Melany Lloyd is a 71 y.o. female with a past medical history noted below who presents for follow-up of elevated liver enzymes, family history of colon cancer.  She additionally has a history of lupus and is followed by rheumatology.     She underwent liver biopsy in August that showed normal liver tissue.  Extensive liver serologies have all been negative.      New complaint of fecal urgency and some incontinence.  She did have pelvic floor reconstruction in September 2021, feels that her surgery is not working.  Takes a fiber gummy daily.      The following tests have all been negative:  Hepatitis B and C antibodies  Smooth muscle antibody  Antimitochondrial antibody  JASS  Ceruloplasmin  Alpha-1 antitrypsin  Celiac serologies  Iron saturation and ferritin  Creatinine kinase  No significant findings on imaging  No significant findings on her liver biopsy  Her GGT has been normal on all checks    She saw nephrologist for elevated potassium levels and for proteinuria.  She tells me the nephrologist told me it was her liver.  However I question this given that other than an elevated AST and ALT level, she has no evidence of liver pathology and normal liver function.    She will see her rheumatologist, Dr. Espinosa, next month.  She has declined treatment for lupus in the past.    No alcohol, no NSAIDs, she denies any complementary/alternative medications        Today's visit was in the office.  Both the patient and I were wearing face masks and proper hand hygiene was performed before and after the physical exam.           Current Outpatient Medications:   •  Cholecalciferol (VITAMIN D) 2000 UNITS capsule, Take 1,000 Units by mouth Daily. HOLDING FOR SURGERY, Disp: , Rfl:   •  estradiol (ESTRACE) 0.1 MG/GM vaginal cream, Insert 2 g into the vagina Daily., Disp: , Rfl:   •  FIBER PO, Take 1 capsule by mouth  Daily. GUMMIES, Disp: , Rfl:   •  Flaxseed, Linseed, (FLAX SEED OIL PO), Take 1 capsule by mouth Daily. HOLDING FOR SURGERY, Disp: , Rfl:   •  FOLIC ACID PO, Take 1 capsule by mouth Daily. HOLDING FOR SURGERY, Disp: , Rfl:   •  furosemide (LASIX) 20 MG tablet, , Disp: , Rfl:   •  Liniments (BLUE-EMU SUPER STRENGTH EX), , Disp: , Rfl:   •  MILK THISTLE PO, Take 1 capsule by mouth 2 (Two) Times a Day. HOLDING FOR SURGERY, Disp: , Rfl:   •  Omega-3 Fatty Acids (FISH OIL PO), Take 1 tablet by mouth 2 (Two) Times a Day. HOLDING FOR SURGERY, Disp: , Rfl:   •  Probiotic Product (PROBIOTIC ADVANCED PO), Take 1 capsule by mouth Daily., Disp: , Rfl:   •  vitamin B-12 (CYANOCOBALAMIN) 1000 MCG tablet, Take 1 tablet by mouth Daily. HOLDING FOR SURGERY, Disp: , Rfl:   •  vitamin B-6 (PYRIDOXINE) 50 MG tablet, Take 50 mg by mouth Daily. HOLDING FOR SURGERY, Disp: , Rfl:       Objective     Vitals:    06/14/22 1416   BP: 133/74   Pulse: 69   Temp: 97.4 °F (36.3 °C)         06/14/22  1416   Weight: 68.9 kg (152 lb)     Body mass index is 23.11 kg/m².    Physical Exam        WBC   Date Value Ref Range Status   12/13/2021 6.7 3.4 - 10.8 x10E3/uL Final     RBC   Date Value Ref Range Status   12/13/2021 4.43 3.77 - 5.28 x10E6/uL Final     Hemoglobin   Date Value Ref Range Status   12/13/2021 13.8 11.1 - 15.9 g/dL Final   09/20/2021 13.7 12.0 - 15.9 g/dL Final     Hematocrit   Date Value Ref Range Status   12/13/2021 40.9 34.0 - 46.6 % Final   09/20/2021 42.6 34.0 - 46.6 % Final     MCV   Date Value Ref Range Status   12/13/2021 92 79 - 97 fL Final   09/20/2021 93.8 79.0 - 97.0 fL Final     MCH   Date Value Ref Range Status   12/13/2021 31.2 26.6 - 33.0 pg Final   09/20/2021 30.2 26.6 - 33.0 pg Final     MCHC   Date Value Ref Range Status   12/13/2021 33.7 31.5 - 35.7 g/dL Final   09/20/2021 32.2 31.5 - 35.7 g/dL Final     RDW   Date Value Ref Range Status   12/13/2021 12.8 11.7 - 15.4 % Final   09/20/2021 12.7 12.3 - 15.4 % Final      RDW-SD   Date Value Ref Range Status   09/20/2021 44.1 37.0 - 54.0 fl Final     MPV   Date Value Ref Range Status   09/20/2021 9.4 6.0 - 12.0 fL Final     Platelets   Date Value Ref Range Status   12/13/2021 380 150 - 450 x10E3/uL Final   09/20/2021 334 140 - 450 10*3/mm3 Final     Neutrophil Rel %   Date Value Ref Range Status   12/13/2021 71 Not Estab. % Final     Lymphocyte Rel %   Date Value Ref Range Status   12/13/2021 19 Not Estab. % Final     Monocyte Rel %   Date Value Ref Range Status   12/13/2021 7 Not Estab. % Final     Eosinophil Rel %   Date Value Ref Range Status   12/13/2021 2 Not Estab. % Final     Basophil Rel %   Date Value Ref Range Status   12/13/2021 1 Not Estab. % Final     Neutrophils Absolute   Date Value Ref Range Status   12/13/2021 4.8 1.4 - 7.0 x10E3/uL Final     Lymphocytes Absolute   Date Value Ref Range Status   12/13/2021 1.3 0.7 - 3.1 x10E3/uL Final     Monocytes Absolute   Date Value Ref Range Status   12/13/2021 0.5 0.1 - 0.9 x10E3/uL Final     Eosinophils Absolute   Date Value Ref Range Status   12/13/2021 0.1 0.0 - 0.4 x10E3/uL Final     Basophils Absolute   Date Value Ref Range Status   12/13/2021 0.1 0.0 - 0.2 x10E3/uL Final     nRBC   Date Value Ref Range Status   07/13/2021 0.0 0.0 - 0.2 /100 WBC Final       Lab Results   Component Value Date    GLUCOSE 90 12/20/2021    BUN 20 12/20/2021    CREATININE 0.60 12/20/2021    EGFRIFNONA 93 12/20/2021    EGFRIFAFRI 108 12/20/2021    BCR 33 (H) 12/20/2021    CO2 28 12/20/2021    CALCIUM 9.6 12/20/2021    PROTENTOTREF 7.0 12/13/2021    ALBUMIN 4.4 12/13/2021    LABIL2 1.7 12/13/2021    AST 50 (H) 12/13/2021    ALT 33 (H) 12/13/2021     Component   Ref Range & Units 2 wk ago   Glucose   65 - 99 mg/dL 80    BUN   8 - 27 mg/dL 20    Creatinine   0.57 - 1.00 mg/dL 0.68    eGFR CKD-EPI CR 2021   >59 mL/min/1.73 94    BUN/Creatinine Ratio   12 - 28 29 High     Sodium   134 - 144 mmol/L 137    Potassium   3.5 - 5.2 mmol/L 5.1     Chloride   96 - 106 mmol/L 97    Bicarbonate (CO2)   20 - 29 mmol/L 21    Calcium   8.7 - 10.3 mg/dL 9.7    Total Protein   6.0 - 8.5 g/dL 7.2    Albumin   3.8 - 4.8 g/dL 4.8    Globulin   1.5 - 4.5 g/dL 2.4    A/G Ratio   1.2 - 2.2 2.0    Total Bilirubin   0.0 - 1.2 mg/dL 0.4    Alkaline Phosphatase   44 - 121 IU/L 45    AST (SGOT)   0 - 40 IU/L 57 High     ALT (SGPT)   0 - 32 IU/L 32    Resulting Agency [facility]     Narrative  Performed by [facility]  Performed at:  01 - [facility] [address]  [id number]   : Curry Phelps PhD, Phone:  [phone]  Specimen Collected: 05/25/22 10:06 AM Last Resulted: 05/26/22 10:36 AM   Received From: [facility]  Result Received: 06/08/22  9:56 AM         I personally reviewed data as detailed below:     The labs listed above.    Office notes from: 5/31/22 nephrology      Assessment and Plan  1.  Elevated liver enzymes: Mild elevation, hepatocellular but I am not quite convinced that this is a liver abnormality given the normal GGT level.  Extensive testing has all been negative.  The only other thing I can think of would be an association with her lupus though I would have expected's something to show up on her liver biopsy    2.  SLE: She will see her rheumatologist next month    3.  Fecal urgency: Possibly related to her rectal prolapse surgery.  She is not really on any adequate fiber to help her    Plan  I think for completeness sake, there is some value to sending her to one of the hepatologist at [facility] to make sure we are not missing anything with her liver.  She is agreeable to the referral  Discuss her liver findings and renal findings with her rheumatologist  I have encouraged her to follow-up with Dr. Carrero, or your gynecologist regarding recurrence of fecal urgency incontinence  I am going to have her start FiberCon, 2 tablets twice daily with 8 ounces of water for stool bulking    Over 30 minutes was spent  today in the office with her reviewing her labs and discussing negative findings and plan of care as detailed above.       Diagnoses and all orders for this visit:    1. Elevated liver enzymes (Primary)    2. Systemic lupus erythematosus, unspecified SLE type, unspecified organ involvement status (HCC)    3. Fecal urgency          I have discussed the above plan with the patient.  They verbalize understanding and are in agreement with the plan.  They have been advised to contact the office for any questions, concerns, or changes related to their health.    Dictated utilizing Dragon dictation

## 2022-06-14 NOTE — TELEPHONE ENCOUNTER
----- Message from Lorraine Mejia MD sent at 6/14/2022  3:09 PM EDT -----  Can you please refer her to Dr. Nettles at [facility] regarding elevated AST and ALT levels with normal liver biopsy and negative serologic work-up.  Please send a copy of her note today, liver biopsy, thank you

## 2022-06-15 NOTE — TELEPHONE ENCOUNTER
Referral to Dr Nettles faxed to [phone] with confirmation received. Once provider reviews medical records, they will call patient to schedule consultation. Patient notified of referral and provided with phone number for Dr Rodriguez office.

## 2022-07-01 ENCOUNTER — TELEPHONE (OUTPATIENT)
Dept: GASTROENTEROLOGY | Facility: CLINIC | Age: 72
End: 2022-07-01

## 2022-07-01 NOTE — TELEPHONE ENCOUNTER
Caller: Melany Lloyd    Relationship to patient: Self    Best call back number: [phone]    Patient is needing: PATIENT ASKING TO HAVE REFERRAL FORWARDED TO DR TERESA TOLEDO. AND CALL TO CONFIRM

## 2022-07-01 NOTE — TELEPHONE ENCOUNTER
Returned pts call.   Pt is asking for records to be resend to Dr eNttles at [facility]  They claim they didn't receive our fax on 6/14  Thanks

## 2022-07-15 ENCOUNTER — TRANSCRIBE ORDERS (OUTPATIENT)
Dept: ADMINISTRATIVE | Facility: HOSPITAL | Age: 72
End: 2022-07-15

## 2022-07-15 DIAGNOSIS — M81.0 OSTEOPOROSIS OF MULTIPLE SITES: Primary | ICD-10-CM

## 2022-08-01 NOTE — TELEPHONE ENCOUNTER
See notes of 6/14 - referral faxed with receipt confirmed.     Call to DR Roberto Nettles's office @ [phone] and spoke with Ladan.   Above referral not received.     Referral, including demographic info, o/v note including labs of 6/14/22, ct liver bx and path report of 8/16/21 faxed to DR Nettles's office @ [phone] and [phone] as requested.  Confirmations received.  See media tab.      Call to DR Nettles's office and spoke with Ladan.  States takes up to 5 business days for referral to show in their system.     Call to pt.  Advise of above.  She will contact [facility] next wk to check.  Advise notify this office if any issues.      Update to DR Mejia.

## 2022-08-01 NOTE — TELEPHONE ENCOUNTER
PER PT, [facility] STILL HAS NOT RECEIVED THIS REFERRAL. SHERIDAN HAS ASKED THAT IT BE FAXED TO BOTH [phone]. PER LAST UPDATED NOTES (7/13) THIS STILL NEEDS TO BE WORKED. PLEASE CALL TO CONFIRM IT WAS RECEIVED ONCE IT IS SENT.

## 2022-08-10 ENCOUNTER — OFFICE VISIT (OUTPATIENT)
Dept: FAMILY MEDICINE CLINIC | Facility: CLINIC | Age: 72
End: 2022-08-10

## 2022-08-10 VITALS
BODY MASS INDEX: 23.49 KG/M2 | SYSTOLIC BLOOD PRESSURE: 121 MMHG | RESPIRATION RATE: 16 BRPM | TEMPERATURE: 97.6 F | WEIGHT: 155 LBS | DIASTOLIC BLOOD PRESSURE: 72 MMHG | HEART RATE: 80 BPM | HEIGHT: 68 IN

## 2022-08-10 DIAGNOSIS — Z00.00 MEDICARE ANNUAL WELLNESS VISIT, SUBSEQUENT: Primary | ICD-10-CM

## 2022-08-10 PROCEDURE — G0439 PPPS, SUBSEQ VISIT: HCPCS | Performed by: FAMILY MEDICINE

## 2022-08-10 PROCEDURE — 1126F AMNT PAIN NOTED NONE PRSNT: CPT | Performed by: FAMILY MEDICINE

## 2022-08-10 PROCEDURE — 1170F FXNL STATUS ASSESSED: CPT | Performed by: FAMILY MEDICINE

## 2022-08-10 PROCEDURE — 1159F MED LIST DOCD IN RCRD: CPT | Performed by: FAMILY MEDICINE

## 2022-08-10 NOTE — PATIENT INSTRUCTIONS
Medicare Wellness  Personal Prevention Plan of Service     Date of Office Visit:    Encounter Provider:  Jose Alberto Kovacs MD  Place of Service:  [facility]  Patient Name: Melany Lloyd  :  1950    As part of the Medicare Wellness portion of your visit today, we are providing you with this personalized preventive plan of services (PPPS). This plan is based upon recommendations of the [place] Preventive Services Task Force (USPSTF) and the Advisory Committee on Immunization Practices (ACIP).    This lists the preventive care services that should be considered, and provides dates of when you are due. Items listed as completed are up-to-date and do not require any further intervention.    Health Maintenance   Topic Date Due    ANNUAL WELLNESS VISIT  2022    DXA SCAN  2022    INFLUENZA VACCINE  10/01/2022    MAMMOGRAM  2024    TDAP/TD VACCINES (3 - Td or Tdap) 2026    COLORECTAL CANCER SCREENING  2026    HEPATITIS C SCREENING  Completed    COVID-19 Vaccine  Completed    Pneumococcal Vaccine 65+  Completed    ZOSTER VACCINE  Completed    PAP SMEAR  Discontinued       No orders of the defined types were placed in this encounter.      Return in about 1 year (around 8/10/2023) for Annual physical.

## 2022-08-10 NOTE — PROGRESS NOTES
The ABCs of the Annual Wellness Visit  Subsequent Medicare Wellness Visit    Chief Complaint   Patient presents with   • MEDICARE WELLNESS     DUE /       Subjective    History of Present Illness:  Melany Lloyd is a 71 y.o. female who presents for a Subsequent Medicare Wellness Visit.    The following portions of the patient's history were reviewed and   updated as appropriate: allergies, current medications, past family history, past medical history, past social history, past surgical history and problem list.    Compared to one year ago, the patient feels her physical   health is the same.    Compared to one year ago, the patient feels her mental   health is the same.    Recent Hospitalizations:  She was not admitted to the hospital during the last year.       Current Medical Providers:  Patient Care Team:  Jose Alberto Kovacs MD as PCP - General (Family Medicine)  Luz Maria Espinosa MD as Consulting Physician (Rheumatology)  Christian Sanders MD as Surgeon (Orthopedic Surgery)  Page Bro MD as Consulting Physician (Ophthalmology)  Tamy Carrero MD as Consulting Physician (Urogynecology)    Outpatient Medications Prior to Visit   Medication Sig Dispense Refill   • Cholecalciferol (VITAMIN D) 2000 UNITS capsule Take 1,000 Units by mouth Daily. HOLDING FOR SURGERY     • estradiol (ESTRACE) 0.1 MG/GM vaginal cream Insert 2 g into the vagina Daily.     • FIBER PO Take 1 capsule by mouth Daily. GUMMIES     • Flaxseed, Linseed, (FLAX SEED OIL PO) Take 1 capsule by mouth Daily. HOLDING FOR SURGERY     • FOLIC ACID PO Take 1 capsule by mouth Daily. HOLDING FOR SURGERY     • furosemide (LASIX) 20 MG tablet      • Liniments (BLUE-EMU SUPER STRENGTH EX)      • MILK THISTLE PO Take 1 capsule by mouth 2 (Two) Times a Day. HOLDING FOR SURGERY     • Omega-3 Fatty Acids (FISH OIL PO) Take 1 tablet by mouth 2 (Two) Times a Day. HOLDING FOR SURGERY     • Probiotic Product (PROBIOTIC ADVANCED PO) Take 1 capsule by mouth Daily.    "  • vitamin B-12 (CYANOCOBALAMIN) 1000 MCG tablet Take 1 tablet by mouth Daily. HOLDING FOR SURGERY     • vitamin B-6 (PYRIDOXINE) 50 MG tablet Take 50 mg by mouth Daily. HOLDING FOR SURGERY       No facility-administered medications prior to visit.       No opioid medication identified on active medication list. I have reviewed chart for other potential  high risk medication/s and harmful drug interactions in the elderly.          Aspirin is not on active medication list.  Aspirin use is not indicated based on review of current medical condition/s. Risk of harm outweighs potential benefits.  .    Patient Active Problem List   Diagnosis   • GERD (gastroesophageal reflux disease)   • Systemic lupus erythematosus (HCC)   • DDD (degenerative disc disease), lumbosacral   • Fibromyalgia   • Familial tremor   • Abnormal MRA, brain   • Cervical disc disorder at C5-C6 level with radiculopathy   • Aneurysm of other specified arteries (HCC)   • Solitary pulmonary nodule   • Vitamin D deficiency   • Mild episode of depression   • Elevated liver enzymes   • Body mass index (bmi) 25.0-25.9, adult   • Osteoarthritis of both feet   • Vaginal atrophy   • Female dyspareunia   • OAB (overactive bladder)   • Hot flashes   • Family history of colon cancer   • Osteoarthritis   • Prolapse of vaginal vault after hysterectomy   • Cystocele, midline   • Cystocele, lateral   • Vaginal enterocele, congenital or acquired   • Rectocele   • Female stress incontinence   • Complication of implanted vaginal mesh, initial encounter   • NSAID long-term use   • Other fatigue   • Fecal urgency     Advance Care Planning  Advance Directive is not on file.  ACP discussion was held with the patient during this visit. Patient has an advance directive (not in EMR), copy requested.          Objective    Vitals:    08/09/22 1314   BP: 121/72   Pulse: 80   Resp: 16   Temp: 97.6 °F (36.4 °C)   TempSrc: Oral   Weight: 70.3 kg (155 lb)   Height: 172.7 cm (68\") " "  PainSc: 0-No pain     Estimated body mass index is 23.57 kg/m² as calculated from the following:    Height as of this encounter: 172.7 cm (68\").    Weight as of this encounter: 70.3 kg (155 lb).    BMI is within normal parameters. No other follow-up for BMI required.      Does the patient have evidence of cognitive impairment? No    Physical Exam            HEALTH RISK ASSESSMENT    Smoking Status:  Social History     Tobacco Use   Smoking Status Never Smoker   Smokeless Tobacco Never Used     Alcohol Consumption:  Social History     Substance and Sexual Activity   Alcohol Use Yes   • Alcohol/week: 0.0 standard drinks    Comment: One drink every couple of months     Fall Risk Screen:    JJ Fall Risk Assessment has not been completed.    Depression Screening:  PHQ-2/PHQ-9 Depression Screening 8/10/2022   Retired PHQ-9 Total Score -   Retired Total Score -   Little Interest or Pleasure in Doing Things 0-->not at all   Feeling Down, Depressed or Hopeless 0-->not at all   PHQ-9: Brief Depression Severity Measure Score 0       Health Habits and Functional and Cognitive Screening:  Functional & Cognitive Status 8/10/2022   Do you have difficulty preparing food and eating? No   Do you have difficulty bathing yourself, getting dressed or grooming yourself? No   Do you have difficulty using the toilet? No   Do you have difficulty moving around from place to place? No   Do you have trouble with steps or getting out of a bed or a chair? No   Current Diet Well Balanced Diet   Dental Exam Up to date   Eye Exam Up to date   Exercise (times per week) 0 times per week   Current Exercises Include No Regular Exercise   Current Exercise Activities Include -   Do you need help using the phone?  No   Are you deaf or do you have serious difficulty hearing?  No   Do you need help with transportation? No   Do you need help shopping? No   Do you need help preparing meals?  No   Do you need help with housework?  No   Do you need help " with laundry? No   Do you need help taking your medications? No   Do you need help managing money? No   Do you ever drive or ride in a car without wearing a seat belt? No   Have you felt unusual stress, anger or loneliness in the last month? No   Who do you live with? Spouse   If you need help, do you have trouble finding someone available to you? Yes   Have you been bothered in the last four weeks by sexual problems? No   Do you have difficulty concentrating, remembering or making decisions? -       Age-appropriate Screening Schedule:  Refer to the list below for future screening recommendations based on patient's age, sex and/or medical conditions. Orders for these recommended tests are listed in the plan section. The patient has been provided with a written plan.    Health Maintenance   Topic Date Due   • DXA SCAN  07/20/2022   • INFLUENZA VACCINE  10/01/2022   • MAMMOGRAM  01/11/2024   • TDAP/TD VACCINES (3 - Td or Tdap) 02/04/2026   • ZOSTER VACCINE  Completed   • PAP SMEAR  Discontinued              Assessment & Plan   CMS Preventative Services Quick Reference  Risk Factors Identified During Encounter  None Identified  The above risks/problems have been discussed with the patient.  Follow up actions/plans if indicated are seen below in the Assessment/Plan Section.  Pertinent information has been shared with the patient in the After Visit Summary.    Diagnoses and all orders for this visit:    1. Medicare annual wellness visit, subsequent (Primary)        Follow Up:   No follow-ups on file.     An After Visit Summary and PPPS were made available to the patient.          I spent 15 minutes caring for Melany on this date of service. This time includes time spent by me in the following activities:preparing for the visit and reviewing tests

## 2022-08-27 NOTE — TELEPHONE ENCOUNTER
Called pt and advised of Dr Mejia note. Advised to call [phone] to arrange ekg.  Advised pt to let us know if she has any problems arrange ekg to let us know.Verb understanding.    Immediate family member

## 2022-09-12 ENCOUNTER — TELEPHONE (OUTPATIENT)
Dept: FAMILY MEDICINE CLINIC | Facility: CLINIC | Age: 72
End: 2022-09-12

## 2022-09-12 DIAGNOSIS — Z12.31 ENCOUNTER FOR SCREENING MAMMOGRAM FOR BREAST CANCER: Primary | ICD-10-CM

## 2022-09-12 NOTE — TELEPHONE ENCOUNTER
Caller: Melany Lloyd    Relationship: Self    Best call back number: [phone]    What is the medical concern/diagnosis: MAMMOGRAM    What specialty or service is being requested: REFERRAL      What is the office location: [facility]       Any additional details: PATIENT WOULD LIKE TO SEE IF SHE CAN HAVE IT DONE AT THE SAME TIME AS HER BONE DENSITY SCAN?

## 2023-01-25 ENCOUNTER — HOSPITAL ENCOUNTER (OUTPATIENT)
Dept: MAMMOGRAPHY | Facility: HOSPITAL | Age: 73
Discharge: HOME | End: 2023-01-25
Payer: MEDICARE

## 2023-01-25 ENCOUNTER — HOSPITAL ENCOUNTER (OUTPATIENT)
Dept: BONE DENSITY | Facility: HOSPITAL | Age: 73
Discharge: HOME | End: 2023-01-25
Payer: MEDICARE

## 2023-01-25 ENCOUNTER — TRANSCRIBE ORDERS (OUTPATIENT)
Dept: ADMINISTRATIVE | Facility: HOSPITAL | Age: 73
End: 2023-01-25
Payer: MEDICARE

## 2023-01-25 ENCOUNTER — HOSPITAL ENCOUNTER (OUTPATIENT)
Dept: GENERAL RADIOLOGY | Facility: HOSPITAL | Age: 73
Discharge: HOME | End: 2023-01-25
Payer: MEDICARE

## 2023-01-25 DIAGNOSIS — M54.2 NECK PAIN: ICD-10-CM

## 2023-01-25 DIAGNOSIS — M81.0 OSTEOPOROSIS OF MULTIPLE SITES: ICD-10-CM

## 2023-01-25 DIAGNOSIS — M54.2 NECK PAIN: Primary | ICD-10-CM

## 2023-01-25 PROCEDURE — 77080 DXA BONE DENSITY AXIAL: CPT

## 2023-01-25 PROCEDURE — 77067 SCR MAMMO BI INCL CAD: CPT

## 2023-01-25 PROCEDURE — 72050 X-RAY EXAM NECK SPINE 4/5VWS: CPT

## 2023-01-25 PROCEDURE — 77063 BREAST TOMOSYNTHESIS BI: CPT

## 2023-03-01 ENCOUNTER — TRANSCRIBE ORDERS (OUTPATIENT)
Dept: ADMINISTRATIVE | Facility: HOSPITAL | Age: 73
End: 2023-03-01
Payer: MEDICARE

## 2023-03-01 DIAGNOSIS — M48.062 SPINAL STENOSIS, LUMBAR REGION, WITH NEUROGENIC CLAUDICATION: Primary | ICD-10-CM

## 2023-03-01 DIAGNOSIS — M48.02 SPINAL STENOSIS IN CERVICAL REGION: ICD-10-CM

## 2023-03-15 ENCOUNTER — TELEPHONE (OUTPATIENT)
Dept: FAMILY MEDICINE CLINIC | Facility: CLINIC | Age: 73
End: 2023-03-15
Payer: MEDICARE

## 2023-03-15 NOTE — TELEPHONE ENCOUNTER
"    Caller: Melany Lloyd \"EDY\"    Relationship: Self    Best call back number: [phone]    What orders are you requesting (i.e. lab or imaging): VITAMIN B12 BLOOD TEST    In what timeframe would the patient need to come in: ANY    Where will you receive your lab/imaging services: IN OFFICE    Additional notes: PATIENT STATES SHE HAS BEEN EXPERIENCING EXTREME FATIGUE. PATIENT'S PREVIOUS PCP HAD MENTIONED TESTING FOR B12 BUT NEVER GOT AROUND TO IT. PATIENT IS REQUESTING DR WEBB PUT ORDERS IN FOR THIS TEST.     PLEASE CALL THE PATIENT ONCE ORDERS HAVE BEEN PLACED.     "

## 2023-03-18 ENCOUNTER — HOSPITAL ENCOUNTER (OUTPATIENT)
Dept: MRI IMAGING | Facility: HOSPITAL | Age: 73
Discharge: HOME | End: 2023-03-18
Payer: MEDICARE

## 2023-03-18 DIAGNOSIS — M48.062 SPINAL STENOSIS, LUMBAR REGION, WITH NEUROGENIC CLAUDICATION: ICD-10-CM

## 2023-03-18 DIAGNOSIS — M48.02 SPINAL STENOSIS IN CERVICAL REGION: ICD-10-CM

## 2023-03-18 PROCEDURE — 72141 MRI NECK SPINE W/O DYE: CPT

## 2023-03-18 PROCEDURE — 72148 MRI LUMBAR SPINE W/O DYE: CPT

## 2023-03-20 NOTE — PROGRESS NOTES
"Subjective   Melany Lloyd is a 72 y.o. female.     CC: Fatigue    History of Present Illness     Pt with several PMH, including SLE, comes in today to discuss some ongoing issues with fatigue for the past few years. Pt reports even gets to the point that her eyelids \"get heavy\". Using OTC B-Complex QD w/o much help. Pt is walking 3 days/week (just got a knee cortisone injection this AM) and does some Yoga, too. Also reports her muscles \"get weaker\" and \"tire out\" on her at times. Pt reports her  tells her she snores and is fatigued in the AM.   Pt is UTD with her Rheumatology office.    The following portions of the patient's history were reviewed and updated as appropriate: allergies, current medications, past family history, past medical history, past social history, past surgical history and problem list.    Review of Systems   Constitutional: Positive for fatigue. Negative for activity change, chills and fever.   Respiratory: Negative for cough.    Cardiovascular: Negative for chest pain.   Psychiatric/Behavioral: Negative for dysphoric mood.       /64   Pulse 77   Temp 97.5 °F (36.4 °C) (Oral)   Resp 16   Ht 172.7 cm (68\")   Wt 70.8 kg (156 lb)   LMP  (LMP Unknown)   BMI 23.72 kg/m²     Objective   Physical Exam  Constitutional:       General: She is not in acute distress.     Appearance: She is well-developed.   Cardiovascular:      Rate and Rhythm: Normal rate and regular rhythm.   Pulmonary:      Effort: Pulmonary effort is normal.      Breath sounds: Normal breath sounds.   Neurological:      Mental Status: She is alert and oriented to person, place, and time.   Psychiatric:         Behavior: Behavior normal.         Thought Content: Thought content normal.         Assessment & Plan   Diagnoses and all orders for this visit:    1. Chronic fatigue (Primary)  -     Comprehensive Metabolic Panel  -     CBC & Differential  -     T4, Free  -     TSH  -     Vitamin B12  -     Acetylcholine " Receptor Antibody Panel  -     Ambulatory Referral to Sleep Medicine    2. Vitamin D deficiency  -     Vitamin D,25-Hydroxy    3. Muscle weakness  -     Acetylcholine Receptor Antibody Panel

## 2023-03-21 ENCOUNTER — OFFICE VISIT (OUTPATIENT)
Dept: ORTHOPEDIC SURGERY | Facility: CLINIC | Age: 73
End: 2023-03-21
Payer: MEDICARE

## 2023-03-21 ENCOUNTER — OFFICE VISIT (OUTPATIENT)
Dept: FAMILY MEDICINE CLINIC | Facility: CLINIC | Age: 73
End: 2023-03-21
Payer: MEDICARE

## 2023-03-21 VITALS — BODY MASS INDEX: 23.67 KG/M2 | WEIGHT: 156.2 LBS | TEMPERATURE: 97.3 F | HEIGHT: 68 IN

## 2023-03-21 VITALS
SYSTOLIC BLOOD PRESSURE: 121 MMHG | WEIGHT: 156 LBS | HEART RATE: 77 BPM | RESPIRATION RATE: 16 BRPM | DIASTOLIC BLOOD PRESSURE: 64 MMHG | BODY MASS INDEX: 23.64 KG/M2 | TEMPERATURE: 97.5 F | HEIGHT: 68 IN

## 2023-03-21 DIAGNOSIS — M62.81 MUSCLE WEAKNESS: ICD-10-CM

## 2023-03-21 DIAGNOSIS — E55.9 VITAMIN D DEFICIENCY: ICD-10-CM

## 2023-03-21 DIAGNOSIS — R53.82 CHRONIC FATIGUE: Primary | ICD-10-CM

## 2023-03-21 DIAGNOSIS — M16.12 PRIMARY OSTEOARTHRITIS OF LEFT HIP: ICD-10-CM

## 2023-03-21 DIAGNOSIS — R52 PAIN: Primary | ICD-10-CM

## 2023-03-21 PROCEDURE — 1159F MED LIST DOCD IN RCRD: CPT | Performed by: ORTHOPAEDIC SURGERY

## 2023-03-21 PROCEDURE — 99214 OFFICE O/P EST MOD 30 MIN: CPT | Performed by: FAMILY MEDICINE

## 2023-03-21 PROCEDURE — 73502 X-RAY EXAM HIP UNI 2-3 VIEWS: CPT | Performed by: ORTHOPAEDIC SURGERY

## 2023-03-21 PROCEDURE — 1159F MED LIST DOCD IN RCRD: CPT | Performed by: FAMILY MEDICINE

## 2023-03-21 PROCEDURE — 99213 OFFICE O/P EST LOW 20 MIN: CPT | Performed by: ORTHOPAEDIC SURGERY

## 2023-03-21 PROCEDURE — 1160F RVW MEDS BY RX/DR IN RCRD: CPT | Performed by: ORTHOPAEDIC SURGERY

## 2023-03-21 PROCEDURE — 1160F RVW MEDS BY RX/DR IN RCRD: CPT | Performed by: FAMILY MEDICINE

## 2023-03-21 RX ORDER — TRAMADOL HYDROCHLORIDE 50 MG/1
TABLET ORAL
COMMUNITY
Start: 2023-01-24

## 2023-03-21 NOTE — PROGRESS NOTES
"Patient: Melany Lloyd  YOB: 1950 72 y.o. female  Medical Record Number: [id number]    Chief Complaints:   Chief Complaint   Patient presents with   • Left Hip - Follow-up, Pain       History of Present Illness:Melany Lloyd is a 72 y.o. female who presents with  Left hip /  Buttock /groin pain with an ache that radiates to the calf on the left side started this summer and became severe-    Was referred to a rheumatologist and has also started being worked up for back issues. There was concern that the hip region pain was referred from the [facility]. Pain was a 9/10 out of 10 now 1-2/10.    Allergies:   Allergies   Allergen Reactions   • Diphenhydramine Hcl Other (See Comments)     \"it wakes me up-I can't sleep.\"   • Penicillins Itching and Rash   • Red Dye Itching and Rash       Medications:   Current Outpatient Medications   Medication Sig Dispense Refill   • Cholecalciferol (VITAMIN D) 2000 UNITS capsule Take 1,000 Units by mouth Daily. HOLDING FOR SURGERY     • estradiol (ESTRACE) 0.1 MG/GM vaginal cream Insert 2 g into the vagina Daily.     • Flaxseed, Linseed, (FLAX SEED OIL PO) Take 1 capsule by mouth Daily. HOLDING FOR SURGERY     • furosemide (LASIX) 20 MG tablet      • Liniments (BLUE-EMU SUPER STRENGTH EX)      • MILK THISTLE PO Take 1 capsule by mouth 2 (Two) Times a Day. HOLDING FOR SURGERY     • Omega-3 Fatty Acids (FISH OIL PO) Take 1 tablet by mouth 2 (Two) Times a Day. HOLDING FOR SURGERY     • Probiotic Product (PROBIOTIC ADVANCED PO) Take 1 capsule by mouth Daily.     • vitamin B-12 (CYANOCOBALAMIN) 1000 MCG tablet Take 1 tablet by mouth Daily. HOLDING FOR SURGERY     • FIBER PO Take 1 capsule by mouth Daily. GUMMIES (Patient not taking: Reported on 3/21/2023)     • FOLIC ACID PO Take 1 capsule by mouth Daily. HOLDING FOR SURGERY (Patient not taking: Reported on 3/21/2023)     • vitamin B-6 (PYRIDOXINE) 50 MG tablet Take 1 tablet by mouth Daily. HOLDING FOR SURGERY (Patient not taking: " "Reported on 3/21/2023)       No current facility-administered medications for this visit.         The following portions of the patient's history were reviewed and updated as appropriate: allergies, current medications, past family history, past medical history, past social history, past surgical history and problem list.    Review of Systems:   Pertinent positives/negative listed in HPI above    Physical Exam:   Vitals:    03/21/23 1104   Temp: 97.3 °F (36.3 °C)   Weight: 70.9 kg (156 lb 3.2 oz)   Height: 172.7 cm (68\")   PainSc: 10-Worst pain ever       General: A and O x 3, ASA, NAD      Hip:  left    LEG ALIGNMENT:     Neutral   ,    equal leg lengths    GAIT:     Nonantalgic - valgus alignment left knee    SKIN:     No abnormality    RANGE OF MOTION:      Near Full without joint irritability except with extremes of motion    STRENGTH:     5 / 5    hip flexion and abduction    DISTAL PULSES:    Paplable    DISTAL SENSATION :   Intact    LYMPHATICS:     No   lymphadenopathy    OTHER:          - Negative Stinchfeld test      - Negative log roll      - No Tenderness to palpation trochanteric bursa      - Mildly positive FADIR      - Neg MANUEL      - No SI tenderness         Radiology:  Xrays 2views left hip (AP bilateral hips, and lateral of the hip) ordered and reviewed for evaluation of hip pain  demonstrating  moderate joint space narrowing and osteophyte formation consistent with moderate to moderately severe osteoarthritis of the hip joint    Assessment/Plan: Left hip region pain and leg pain which is calm down considerably since this flareup episode.  It is difficult for me to fully assess today whether this was coming more from the back of the hip because today both are reasonably well controlled.  She does have degenerative changes of the lumbar spine with no major areas of nerve compression and also has moderate arthritis in the hip still with some mild joint space preservation.  The plan at this point is " observation she can participate in activities as tolerated if she has another flareup of similar symptoms I have encouraged her to call me so I can evaluate at that time and then we will determine any interventions based on the findings during flareup.      Diagnoses and all orders for this visit:    1. Pain (Primary)  -     XR Hip With or Without Pelvis 2 - 3 View Left        Christian Sanders MD  3/21/2023

## 2023-04-03 ENCOUNTER — TELEPHONE (OUTPATIENT)
Dept: FAMILY MEDICINE CLINIC | Facility: CLINIC | Age: 73
End: 2023-04-03

## 2023-04-03 NOTE — TELEPHONE ENCOUNTER
"  Caller: Melany Lloyd \"EDY\"    Relationship: Self    Best call back number: [phone]           What test was performed: BLOOD WORK      When was the test performed: 3/22/2023    Where was the test performed:[facility]    Additional notes:    RESULTS REQUESTED   "

## 2023-04-06 LAB
25(OH)D3+25(OH)D2 SERPL-MCNC: 28.1 NG/ML (ref 30–100)
ACHR BIND AB SER-SCNC: 0.05 NMOL/L (ref 0–0.24)
ACHR BLOCK AB SER-ACNC: 13 % (ref 0–25)
ACHR MOD AB SER QL FC: 0 % (ref 0–45)
ALBUMIN SERPL-MCNC: 4.5 G/DL (ref 3.8–4.8)
ALBUMIN/GLOB SERPL: 1.8 {RATIO} (ref 1.2–2.2)
ALP SERPL-CCNC: 46 IU/L (ref 44–121)
ALT SERPL-CCNC: 24 IU/L (ref 0–32)
AST SERPL-CCNC: 41 IU/L (ref 0–40)
BASOPHILS # BLD AUTO: 0.1 X10E3/UL (ref 0–0.2)
BASOPHILS NFR BLD AUTO: 1 %
BILIRUB SERPL-MCNC: 0.4 MG/DL (ref 0–1.2)
BUN SERPL-MCNC: 15 MG/DL (ref 8–27)
BUN/CREAT SERPL: 26 (ref 12–28)
CALCIUM SERPL-MCNC: 9.3 MG/DL (ref 8.7–10.3)
CHLORIDE SERPL-SCNC: 99 MMOL/L (ref 96–106)
CO2 SERPL-SCNC: 26 MMOL/L (ref 20–29)
CREAT SERPL-MCNC: 0.57 MG/DL (ref 0.57–1)
EGFRCR SERPLBLD CKD-EPI 2021: 98 ML/MIN/1.73
EOSINOPHIL # BLD AUTO: 0.1 X10E3/UL (ref 0–0.4)
EOSINOPHIL NFR BLD AUTO: 2 %
ERYTHROCYTE [DISTWIDTH] IN BLOOD BY AUTOMATED COUNT: 12.7 % (ref 11.7–15.4)
GLOBULIN SER CALC-MCNC: 2.5 G/DL (ref 1.5–4.5)
GLUCOSE SERPL-MCNC: 106 MG/DL (ref 70–99)
HCT VFR BLD AUTO: 41 % (ref 34–46.6)
HGB BLD-MCNC: 13.9 G/DL (ref 11.1–15.9)
IMM GRANULOCYTES # BLD AUTO: 0 X10E3/UL (ref 0–0.1)
IMM GRANULOCYTES NFR BLD AUTO: 0 %
LYMPHOCYTES # BLD AUTO: 1.2 X10E3/UL (ref 0.7–3.1)
LYMPHOCYTES NFR BLD AUTO: 21 %
MCH RBC QN AUTO: 31.5 PG (ref 26.6–33)
MCHC RBC AUTO-ENTMCNC: 33.9 G/DL (ref 31.5–35.7)
MCV RBC AUTO: 93 FL (ref 79–97)
MONOCYTES # BLD AUTO: 0.5 X10E3/UL (ref 0.1–0.9)
MONOCYTES NFR BLD AUTO: 9 %
MUSK AB SER IA-ACNC: <1 U/ML
NEUTROPHILS # BLD AUTO: 4 X10E3/UL (ref 1.4–7)
NEUTROPHILS NFR BLD AUTO: 67 %
PLATELET # BLD AUTO: 314 X10E3/UL (ref 150–450)
POTASSIUM SERPL-SCNC: 4.7 MMOL/L (ref 3.5–5.2)
PROT SERPL-MCNC: 7 G/DL (ref 6–8.5)
RBC # BLD AUTO: 4.41 X10E6/UL (ref 3.77–5.28)
REFLEX INFORMATION: NORMAL
SODIUM SERPL-SCNC: 138 MMOL/L (ref 134–144)
T4 FREE SERPL-MCNC: 0.91 NG/DL (ref 0.82–1.77)
TSH SERPL DL<=0.005 MIU/L-ACNC: 2.45 UIU/ML (ref 0.45–4.5)
VIT B12 SERPL-MCNC: 1077 PG/ML (ref 232–1245)
WBC # BLD AUTO: 5.9 X10E3/UL (ref 3.4–10.8)

## 2023-05-26 ENCOUNTER — TELEPHONE (OUTPATIENT)
Dept: GASTROENTEROLOGY | Facility: CLINIC | Age: 73
End: 2023-05-26
Payer: MEDICARE

## 2023-05-26 DIAGNOSIS — A09 DIARRHEA OF INFECTIOUS ORIGIN: Primary | ICD-10-CM

## 2023-05-26 NOTE — TELEPHONE ENCOUNTER
I agree she needs stool studies for infection.  Orders placed.  Also make sure she is taking her fiber-previously on FiberCon 2 tablets twice daily.      I called patient and let her know that she could stop by [facility] Saturday and  the stool kit.  Then she can return them to our office or [facility] on Tuesday.  She is out of town until tonight but will proceed to [facility] tomorrow.

## 2023-05-26 NOTE — TELEPHONE ENCOUNTER
"Call to pt.  Was referred to DR Puga to complete liver eval (see o/v notes of 6/14/22).      Reports 4/17 developed diarrhea in mornings.  Some days, this occurs throughout the day and into the night.  4/24 noted one day of low grade fever and nausea.  None since.  States on worst days, diarrhea 5-6x/day.  Denies blood.     Reports LLQ discomfort accompanied by HA about 1 hr after eating.      Has tried changing diet without improvement.      States stool has \"stench\" and notes a lot of intestinal gas.  Denies any recent abx.     Appt scheduled with DR Mejia for 6/1 @ 9:15 am.  Advise she is out of office.  Will also send message to [facility] to check if any stool studies indicated in the interim.      Instruct if symptoms become OOC - go to ER for immediate eval.  Verb understanding.   "

## 2023-05-26 NOTE — TELEPHONE ENCOUNTER
"  Hub staff attempted to follow warm transfer process and was unsuccessful     Caller: Melany Lloyd \"EDY\"    Relationship to patient: Self    Best call back number: [phone] (Mobile)      Patient is needing: PT WHO HAS BEEN REFERRED TO [facility] DR VIVAR IS CALLING TO GET AN APPT WITH DR CORNEJO. SHE CAN NOT BE SEEN BY DR VIVAR TILL 8/31/23. SINCE 4/17/23 SHE HAS BEEN EXPERIENCING DAILY DIARRHEA THAT SOMETIMES LASTS ALL DAY. SHE IN EARLY April WHEN IT FIRST STARTED SHE HAD A MILD FEVER. PLEASE CALL PT ASAP.         "

## 2023-06-01 ENCOUNTER — OFFICE VISIT (OUTPATIENT)
Dept: GASTROENTEROLOGY | Facility: CLINIC | Age: 73
End: 2023-06-01

## 2023-06-01 VITALS
SYSTOLIC BLOOD PRESSURE: 125 MMHG | DIASTOLIC BLOOD PRESSURE: 81 MMHG | HEIGHT: 68 IN | WEIGHT: 151.2 LBS | OXYGEN SATURATION: 95 % | BODY MASS INDEX: 22.91 KG/M2 | HEART RATE: 62 BPM | TEMPERATURE: 96.8 F

## 2023-06-01 DIAGNOSIS — R19.7 ACUTE DIARRHEA: Primary | ICD-10-CM

## 2023-06-01 DIAGNOSIS — Z80.0 FAMILY HISTORY OF COLON CANCER: ICD-10-CM

## 2023-06-01 DIAGNOSIS — R10.32 LLQ PAIN: ICD-10-CM

## 2023-06-01 DIAGNOSIS — R74.8 ELEVATED LIVER ENZYMES: Chronic | ICD-10-CM

## 2023-06-01 PROCEDURE — 1160F RVW MEDS BY RX/DR IN RCRD: CPT | Performed by: INTERNAL MEDICINE

## 2023-06-01 PROCEDURE — 1159F MED LIST DOCD IN RCRD: CPT | Performed by: INTERNAL MEDICINE

## 2023-06-01 PROCEDURE — 99214 OFFICE O/P EST MOD 30 MIN: CPT | Performed by: INTERNAL MEDICINE

## 2023-06-01 RX ORDER — SODIUM CHLORIDE, SODIUM LACTATE, POTASSIUM CHLORIDE, CALCIUM CHLORIDE 600; 310; 30; 20 MG/100ML; MG/100ML; MG/100ML; MG/100ML
30 INJECTION, SOLUTION INTRAVENOUS CONTINUOUS
OUTPATIENT
Start: 2023-06-01

## 2023-06-01 RX ORDER — VITAMIN B COMPLEX
CAPSULE ORAL DAILY
COMMUNITY

## 2023-06-01 RX ORDER — MAGNESIUM OXIDE 400 MG/1
250 TABLET ORAL DAILY
COMMUNITY

## 2023-06-01 RX ORDER — CYCLOSPORINE 0.5 MG/ML
EMULSION OPHTHALMIC
COMMUNITY
Start: 2023-03-22

## 2023-06-01 RX ORDER — CLINDAMYCIN HYDROCHLORIDE 150 MG/1
CAPSULE ORAL
COMMUNITY
Start: 2023-04-11 | End: 2023-06-01

## 2023-06-01 NOTE — PROGRESS NOTES
"Chief Complaint   Patient presents with   • Diarrhea   • Abdominal Pain       Subjective     HPI    Melany Lloyd is a 72 y.o. female with a past medical history noted below who presents for follow-up.  She has been seen for elevated liver enzymes with a normal liver biopsy and normal liver serologies., family history of colon cancer, and fecal smearing.       She is here with a complaints of diarrhea.  This started on April 16th after cooking and eating split soup that she made.  Got gas and bloating and then developed diarrhea that has been \"continual.\"  It is mostly in the morning.  She had some nocturnal stools during that first week of symptoms.  Averaging about 3 BMs in the morning, done by 10am.  Usually no further BMs.  No cramping but feeling a little achey in her lower abdomen.      Eating BRAT diet.  Has not taken any anti-diarrheals.  She has had a small amount of weight loss.  Appetite has been normal.    Taking magnesium for leg cramps.  Cramps are usually at night.      C diff and O and P exam was negative.      Had to cancel her February appt with Dr Nettles due to illness, she is rescheduled for August.      Today's visit was in the office.  Both the patient and I were wearing face masks and proper hand hygiene was performed before and after the physical exam.     Past Medical History:   Diagnosis Date   • ADHD (attention deficit hyperactivity disorder) Life long   • Allergic    • Anxiety    • Arthritis of back 2003   • Arthritis of neck 2003   • Cataract    • Cervical disc disorder 2003   • Chronic headache    • DDD (degenerative disc disease), lumbosacral    • Diverticulosis 2021   • Fibromyalgia    • Fibromyalgia, primary    • Fracture of wrist 1968   • Fracture, foot 1983   • Frequent urination    • Hip arthrosis 2020?   • History of Bell's palsy    • History of encephalitis     ABOUT 15 YEARS AGO   • IBS (irritable bowel syndrome)    • Knee swelling 1995?   • Lactose intolerance 1990   • Low " back strain 2002   • Lumbosacral disc disease 2003   • Lupus    • Neck strain 2003   • OP (osteoporosis)    • Osteopenia 2003   • Osteoporosis 08/19/2016   • Periarthritis of shoulder 2020   • Rotator cuff syndrome R rotator cuff repair 2018   • Shortness of breath    • Subluxation of patella 2002    Cracked patella about 1992   • Tear of meniscus of knee 2010?   • Thoracic disc disorder 2003   • Tremor 2019   • Urinary urgency    • Urine incontinence     WEARS PAD          Current Outpatient Medications:   •  B Complex Vitamins (vitamin b complex) capsule capsule, Take  by mouth Daily., Disp: , Rfl:   •  Cholecalciferol (VITAMIN D) 2000 UNITS capsule, Take 5,000 Units by mouth Daily. HOLDING FOR SURGERY, Disp: , Rfl:   •  estradiol (ESTRACE) 0.1 MG/GM vaginal cream, Insert 2 g into the vagina Daily., Disp: , Rfl:   •  Flaxseed, Linseed, (FLAX SEED OIL PO), Take 1 capsule by mouth Daily. HOLDING FOR SURGERY, Disp: , Rfl:   •  magnesium oxide (MAG-OX) 400 MG tablet, Take 250 mg by mouth Daily., Disp: , Rfl:   •  MILK THISTLE PO, Take 1 capsule by mouth 2 (Two) Times a Day. HOLDING FOR SURGERY, Disp: , Rfl:   •  Omega-3 Fatty Acids (FISH OIL PO), Take 1 tablet by mouth 2 (Two) Times a Day. HOLDING FOR SURGERY, Disp: , Rfl:   •  Probiotic Product (PROBIOTIC ADVANCED PO), Take 1 capsule by mouth Daily., Disp: , Rfl:   •  Restasis 0.05 % ophthalmic emulsion, , Disp: , Rfl:   •  traMADol (ULTRAM) 50 MG tablet, , Disp: , Rfl:       Objective     Vitals:    06/01/23 0928   BP: 125/81   Pulse: 62   Temp: 96.8 °F (36 °C)   SpO2: 95%         06/01/23 0928   Weight: 68.6 kg (151 lb 3.2 oz)     Body mass index is 22.99 kg/m².    Physical Exam        WBC   Date Value Ref Range Status   03/22/2023 5.9 3.4 - 10.8 x10E3/uL Final     RBC   Date Value Ref Range Status   03/22/2023 4.41 3.77 - 5.28 x10E6/uL Final     Hemoglobin   Date Value Ref Range Status   03/22/2023 13.9 11.1 - 15.9 g/dL Final   09/20/2021 13.7 12.0 - 15.9 g/dL  Final     Hematocrit   Date Value Ref Range Status   03/22/2023 41.0 34.0 - 46.6 % Final   09/20/2021 42.6 34.0 - 46.6 % Final     MCV   Date Value Ref Range Status   03/22/2023 93 79 - 97 fL Final   09/20/2021 93.8 79.0 - 97.0 fL Final     MCH   Date Value Ref Range Status   03/22/2023 31.5 26.6 - 33.0 pg Final   09/20/2021 30.2 26.6 - 33.0 pg Final     MCHC   Date Value Ref Range Status   03/22/2023 33.9 31.5 - 35.7 g/dL Final   09/20/2021 32.2 31.5 - 35.7 g/dL Final     RDW   Date Value Ref Range Status   03/22/2023 12.7 11.7 - 15.4 % Final   09/20/2021 12.7 12.3 - 15.4 % Final     RDW-SD   Date Value Ref Range Status   09/20/2021 44.1 37.0 - 54.0 fl Final     MPV   Date Value Ref Range Status   09/20/2021 9.4 6.0 - 12.0 fL Final     Platelets   Date Value Ref Range Status   03/22/2023 314 150 - 450 x10E3/uL Final   09/20/2021 334 140 - 450 10*3/mm3 Final     Neutrophil Rel %   Date Value Ref Range Status   03/22/2023 67 Not Estab. % Final     Lymphocyte Rel %   Date Value Ref Range Status   03/22/2023 21 Not Estab. % Final     Monocyte Rel %   Date Value Ref Range Status   03/22/2023 9 Not Estab. % Final     Eosinophil Rel %   Date Value Ref Range Status   03/22/2023 2 Not Estab. % Final     Basophil Rel %   Date Value Ref Range Status   03/22/2023 1 Not Estab. % Final     Neutrophils Absolute   Date Value Ref Range Status   03/22/2023 4.0 1.4 - 7.0 x10E3/uL Final     Lymphocytes Absolute   Date Value Ref Range Status   03/22/2023 1.2 0.7 - 3.1 x10E3/uL Final     Monocytes Absolute   Date Value Ref Range Status   03/22/2023 0.5 0.1 - 0.9 x10E3/uL Final     Eosinophils Absolute   Date Value Ref Range Status   03/22/2023 0.1 0.0 - 0.4 x10E3/uL Final     Basophils Absolute   Date Value Ref Range Status   03/22/2023 0.1 0.0 - 0.2 x10E3/uL Final     nRBC   Date Value Ref Range Status   07/13/2021 0.0 0.0 - 0.2 /100 WBC Final       Lab Results   Component Value Date    GLUCOSE 106 (H) 03/22/2023    BUN 15 03/22/2023     CREATININE 0.57 03/22/2023    EGFRIFNONA 93 12/20/2021    EGFRIFAFRI 108 12/20/2021    BCR 26 03/22/2023    CO2 26 03/22/2023    CALCIUM 9.3 03/22/2023    PROTENTOTREF 7.0 03/22/2023    ALBUMIN 4.5 03/22/2023    LABIL2 1.8 03/22/2023    AST 41 (H) 03/22/2023    ALT 24 03/22/2023         Imaging Results (Last 7 Days)     ** No results found for the last 168 hours. **          I personally reviewed data as detailed below:     The labs listed above.    Office notes from: 3/21/23 pcp      Assessment and Plan         Diagnoses and all orders for this visit:    1. Acute diarrhea (Primary)  -     Case Request; Standing  -     Follow Anesthesia Guidelines / Protocol; Future  -     Obtain Informed Consent; Future  -     Implement Anesthesia Orders Day of Procedure; Standing  -     Obtain Informed Consent; Standing  -     Verify bowel prep was successful; Standing  -     lactated ringers infusion  -     Case Request  -     US Gallbladder; Future    2. LLQ pain  -     Case Request; Standing  -     Follow Anesthesia Guidelines / Protocol; Future  -     Obtain Informed Consent; Future  -     Implement Anesthesia Orders Day of Procedure; Standing  -     Obtain Informed Consent; Standing  -     Verify bowel prep was successful; Standing  -     lactated ringers infusion  -     Case Request    3. Elevated liver enzymes  -     US Gallbladder; Future    4. Family history of colon cancer  -     Case Request; Standing  -     Follow Anesthesia Guidelines / Protocol; Future  -     Obtain Informed Consent; Future  -     Implement Anesthesia Orders Day of Procedure; Standing  -     Obtain Informed Consent; Standing  -     Verify bowel prep was successful; Standing  -     lactated ringers infusion  -     Case Request    Plan  Start Kaopectate; cautioned regarding dark stool  Stop magnesium.  Advised to drink Gatorade or Pedialyte nightly to help with leg cramps  Proceed with colonoscopy for further evaluation of diarrheal  symptoms  Gallbladder ultrasound as she is concerned that symptoms are related to her gallbladder  Follow-up with Dr. Nettles as scheduled; she has had persistent ALT elevations despite normal serologies and liver biopsy      I have discussed the above plan with the patient.  They verbalize understanding and are in agreement with the plan.  They have been advised to contact the office for any questions, concerns, or changes related to their health.    Dictated utilizing Dragon dictation

## 2023-06-07 ENCOUNTER — HOSPITAL ENCOUNTER (OUTPATIENT)
Dept: ULTRASOUND IMAGING | Facility: HOSPITAL | Age: 73
Discharge: HOME | End: 2023-06-07
Admitting: INTERNAL MEDICINE
Payer: MEDICARE

## 2023-06-07 DIAGNOSIS — R74.8 ELEVATED LIVER ENZYMES: Chronic | ICD-10-CM

## 2023-06-07 DIAGNOSIS — R19.7 ACUTE DIARRHEA: ICD-10-CM

## 2023-06-07 PROCEDURE — 76705 ECHO EXAM OF ABDOMEN: CPT

## 2023-06-09 ENCOUNTER — HOSPITAL ENCOUNTER (OUTPATIENT)
Facility: HOSPITAL | Age: 73
Setting detail: HOSPITAL OUTPATIENT SURGERY
Discharge: HOME | End: 2023-06-09
Attending: INTERNAL MEDICINE | Admitting: INTERNAL MEDICINE
Payer: MEDICARE

## 2023-06-09 ENCOUNTER — ANESTHESIA EVENT (OUTPATIENT)
Dept: GASTROENTEROLOGY | Facility: HOSPITAL | Age: 73
End: 2023-06-09
Payer: MEDICARE

## 2023-06-09 ENCOUNTER — ANESTHESIA (OUTPATIENT)
Dept: GASTROENTEROLOGY | Facility: HOSPITAL | Age: 73
End: 2023-06-09
Payer: MEDICARE

## 2023-06-09 VITALS
HEIGHT: 68 IN | RESPIRATION RATE: 16 BRPM | DIASTOLIC BLOOD PRESSURE: 82 MMHG | BODY MASS INDEX: 22.99 KG/M2 | SYSTOLIC BLOOD PRESSURE: 154 MMHG | OXYGEN SATURATION: 100 % | HEART RATE: 53 BPM

## 2023-06-09 DIAGNOSIS — Z80.0 FAMILY HISTORY OF COLON CANCER: ICD-10-CM

## 2023-06-09 DIAGNOSIS — R19.7 ACUTE DIARRHEA: ICD-10-CM

## 2023-06-09 DIAGNOSIS — R10.32 LLQ PAIN: ICD-10-CM

## 2023-06-09 PROCEDURE — 88305 TISSUE EXAM BY PATHOLOGIST: CPT | Performed by: INTERNAL MEDICINE

## 2023-06-09 PROCEDURE — 25010000002 PROPOFOL 10 MG/ML EMULSION: Performed by: NURSE ANESTHETIST, CERTIFIED REGISTERED

## 2023-06-09 PROCEDURE — 45380 COLONOSCOPY AND BIOPSY: CPT | Performed by: INTERNAL MEDICINE

## 2023-06-09 RX ORDER — ONDANSETRON 2 MG/ML
4 INJECTION INTRAMUSCULAR; INTRAVENOUS ONCE AS NEEDED
Status: DISCONTINUED | OUTPATIENT
Start: 2023-06-09 | End: 2023-06-09 | Stop reason: HOSPADM

## 2023-06-09 RX ORDER — LIDOCAINE HYDROCHLORIDE 20 MG/ML
INJECTION, SOLUTION INFILTRATION; PERINEURAL AS NEEDED
Status: DISCONTINUED | OUTPATIENT
Start: 2023-06-09 | End: 2023-06-09 | Stop reason: SURG

## 2023-06-09 RX ORDER — PROPOFOL 10 MG/ML
VIAL (ML) INTRAVENOUS AS NEEDED
Status: DISCONTINUED | OUTPATIENT
Start: 2023-06-09 | End: 2023-06-09 | Stop reason: SURG

## 2023-06-09 RX ORDER — SODIUM CHLORIDE, SODIUM LACTATE, POTASSIUM CHLORIDE, CALCIUM CHLORIDE 600; 310; 30; 20 MG/100ML; MG/100ML; MG/100ML; MG/100ML
30 INJECTION, SOLUTION INTRAVENOUS CONTINUOUS
Status: DISCONTINUED | OUTPATIENT
Start: 2023-06-09 | End: 2023-06-09 | Stop reason: HOSPADM

## 2023-06-09 RX ADMIN — SODIUM CHLORIDE, POTASSIUM CHLORIDE, SODIUM LACTATE AND CALCIUM CHLORIDE 30 ML/HR: 600; 310; 30; 20 INJECTION, SOLUTION INTRAVENOUS at 08:01

## 2023-06-09 RX ADMIN — PROPOFOL 200 MCG/KG/MIN: 10 INJECTION, EMULSION INTRAVENOUS at 08:10

## 2023-06-09 RX ADMIN — PROPOFOL 60 MG: 10 INJECTION, EMULSION INTRAVENOUS at 08:10

## 2023-06-09 RX ADMIN — LIDOCAINE HYDROCHLORIDE 60 MG: 20 INJECTION, SOLUTION INFILTRATION; PERINEURAL at 08:10

## 2023-06-09 NOTE — ANESTHESIA PREPROCEDURE EVALUATION
Anesthesia Evaluation     Patient summary reviewed   history of anesthetic complications:  PONV               Airway   Mallampati: II  Neck ROM: full  No difficulty expected  Dental      Pulmonary    Cardiovascular     ECG reviewed  Rhythm: regular        Neuro/Psych    ROS Comment: Hx bells palsy  GI/Hepatic/Renal/Endo    (+) GERD    ROS Comment: IBS    Musculoskeletal     Abdominal    Substance History      OB/GYN          Other                        Anesthesia Plan    ASA 3     MAC       Anesthetic plan, risks, benefits, and alternatives have been provided, discussed and informed consent has been obtained with: patient.      CODE STATUS:

## 2023-06-09 NOTE — ANESTHESIA POSTPROCEDURE EVALUATION
Patient: Melany Lloyd    Procedure Summary       Date: 06/09/23 Room / [facility]:  [facility]    Anesthesia Start: 0805 Anesthesia Stop: 0843    Procedure: COLONOSCOPY into cecum/terminal ileum with biopsy Diagnosis:       Acute diarrhea      LLQ pain      Family history of colon cancer      (Acute diarrhea [R19.7])      (LLQ pain [R10.32])      (Family history of colon cancer [Z80.0])    Surgeons: Lorraine Mejia MD Provider: David Cotton MD    Anesthesia Type: MAC ASA Status: 3            Anesthesia Type: MAC    Vitals  Vitals Value Taken Time   /82 06/09/23 0902   Temp     Pulse 53 06/09/23 0902   Resp 16 06/09/23 0902   SpO2 100 % 06/09/23 0902           Post Anesthesia Care and Evaluation    Patient location during evaluation: PACU  Patient participation: complete - patient participated  Level of consciousness: awake  Pain score: 1  Pain management: adequate  Anesthetic complications: No anesthetic complications  PONV Status: none  Cardiovascular status: acceptable  Respiratory status: acceptable  Hydration status: acceptable

## 2023-06-13 DIAGNOSIS — D17.71 RENAL ANGIOMYOLIPOMA: Primary | ICD-10-CM

## 2023-06-13 LAB
LAB AP CASE REPORT: NORMAL
PATH REPORT.FINAL DX SPEC: NORMAL
PATH REPORT.GROSS SPEC: NORMAL

## 2023-06-13 RX ORDER — BUDESONIDE 9 MG/1
TABLET, FILM COATED, EXTENDED RELEASE ORAL
Qty: 50 TABLET | Refills: 0 | Status: SHIPPED | OUTPATIENT
Start: 2023-06-13 | End: 2023-08-22

## 2023-06-14 ENCOUNTER — TELEPHONE (OUTPATIENT)
Dept: GASTROENTEROLOGY | Facility: CLINIC | Age: 73
End: 2023-06-14
Payer: MEDICARE

## 2023-06-16 ENCOUNTER — TELEPHONE (OUTPATIENT)
Dept: GASTROENTEROLOGY | Facility: CLINIC | Age: 73
End: 2023-06-16

## 2023-06-16 ENCOUNTER — TELEPHONE (OUTPATIENT)
Dept: GASTROENTEROLOGY | Facility: CLINIC | Age: 73
End: 2023-06-16
Payer: MEDICARE

## 2023-06-16 NOTE — TELEPHONE ENCOUNTER
----- Message from Lorraine Mejia MD sent at 6/13/2023  2:13 PM EDT -----  Gallbladder ultrasound:  No evidence of cholelithiasis.  Normal appearance of liver.  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    I am going to refer her to urology for surveillance of the angiomyolipoma if she is not already followed by a urologist.

## 2023-06-16 NOTE — TELEPHONE ENCOUNTER
"    Caller: Melany Lloyd \"EDY\"    Relationship to patient: Self    Best call back number: [phone]    Patient is needing: PATIENT CALLED BACK REGARD MESSAGE LEFT BY THE OFFICE - \"lvm for pt in regards to previous antidiarrheal medications for Budesonide PA \"  UNABLE TO WARM TRANSFER THE CALL. PLEASE CALL PATIENT BACK AT THE NUMBER ABOVE [phone].        "

## 2023-06-19 ENCOUNTER — TELEPHONE (OUTPATIENT)
Dept: GASTROENTEROLOGY | Facility: CLINIC | Age: 73
End: 2023-06-19
Payer: MEDICARE

## 2023-06-19 NOTE — TELEPHONE ENCOUNTER
Please advise patient her insurance denial of the budesonide and the reasoning.    She should start Imodium, 1 to 2 tablets before all meals to control diarrhea.  She can adjust the dosing up a little bit or down a little bit; maximum of 8 tablets in a day

## 2023-06-19 NOTE — TELEPHONE ENCOUNTER
Called pt back in regards to if the pt had tried any recent antidiarrheal medications and in which pt states that she has years ago but pt also stated that she isn't having diarrhea as persistent as she did before and wanted to know that she can do to obtain from being constipated. I let pt know that a nurse will reach out to her and discuss this matter with her.              Routed to pool 1

## 2023-06-19 NOTE — TELEPHONE ENCOUNTER
----- Message from Lorraine Mejia MD sent at 6/13/2023  4:50 PM EDT -----  Colonoscopy results  Biopsies confirm lymphocytic colitis    Tapered course of budesonide to treat diarrhea has been ordered    Move colonoscopy recall to 5 years from now (previously due 2026)

## 2023-06-19 NOTE — TELEPHONE ENCOUNTER
It is noted that this pt has seen their message from Dr Mejia  Hm and cs recall placed for 3/5/28

## 2023-06-19 NOTE — TELEPHONE ENCOUNTER
"Prior authorization has been denied. Due to pt not having tried any antidiarrheal medications.      \"We are unable to approve your request for this drug. The following criteria were not met: The patient has experienced an inadequate treatment response, intolerance, or has a contraindication to at least one 5-aminosalicylic acid (5-ASA) therapy. The records your doctor gave us did not show you met (any of) the requirements.\"    Denial letter scanned into chart.  "

## 2023-08-16 ENCOUNTER — TELEPHONE (OUTPATIENT)
Dept: FAMILY MEDICINE CLINIC | Facility: CLINIC | Age: 73
End: 2023-08-16
Payer: MEDICARE

## 2023-08-16 DIAGNOSIS — E87.5 HYPERKALEMIA: ICD-10-CM

## 2023-08-16 DIAGNOSIS — R53.82 CHRONIC FATIGUE: ICD-10-CM

## 2023-08-16 DIAGNOSIS — E55.9 VITAMIN D DEFICIENCY: Primary | ICD-10-CM

## 2023-08-16 DIAGNOSIS — K21.9 GASTROESOPHAGEAL REFLUX DISEASE, UNSPECIFIED WHETHER ESOPHAGITIS PRESENT: ICD-10-CM

## 2023-08-16 DIAGNOSIS — E78.00 PURE HYPERCHOLESTEROLEMIA: ICD-10-CM

## 2023-08-16 NOTE — TELEPHONE ENCOUNTER
"  Caller: Melany Lloyd \"EDY\"    Relationship: Self    Best call back number: [phone]     What orders are you requesting (i.e. lab or imaging): LABS - CHOLESTEROL, VITAMIN D     In what timeframe would the patient need to come in: PRIOR TO APPOINTMENT WITH DR WEBB ON 09/0723    Where will you receive your lab/imaging services: OFFICE    Additional notes: PATIENT WOULD LIKE A CALL BACK TO SCHEDULE THE APPOINTMENT ONCE ORDERS ARE PLACED. PLEASE ADVISE.        "

## 2023-08-18 NOTE — TELEPHONE ENCOUNTER
Lm lab orders placed.   We have a dedicated [facility] inside our office if you would like a scheduled appointment then just call us at [phone]. You would need to check in our office like an appointment so they can let her know you are here. OR you can use the walk-in [facility] down the [address] like previously. They are separate from us and they pull the lab orders from our system.      Okay for [facility] to relay

## 2023-08-24 LAB
25(OH)D3+25(OH)D2 SERPL-MCNC: 32 NG/ML (ref 30–100)
ALBUMIN SERPL-MCNC: 4.5 G/DL (ref 3.5–5.2)
ALBUMIN/GLOB SERPL: 2.1 G/DL
ALP SERPL-CCNC: 42 U/L (ref 39–117)
ALT SERPL-CCNC: 26 U/L (ref 1–33)
AST SERPL-CCNC: 41 U/L (ref 1–32)
BASOPHILS # BLD AUTO: 0.05 10*3/MM3 (ref 0–0.2)
BASOPHILS NFR BLD AUTO: 1.1 % (ref 0–1.5)
BILIRUB SERPL-MCNC: 0.4 MG/DL (ref 0–1.2)
BUN SERPL-MCNC: 22 MG/DL (ref 8–23)
BUN/CREAT SERPL: 31 (ref 7–25)
CALCIUM SERPL-MCNC: 9.8 MG/DL (ref 8.6–10.5)
CHLORIDE SERPL-SCNC: 100 MMOL/L (ref 98–107)
CHOLEST SERPL-MCNC: 257 MG/DL (ref 0–200)
CO2 SERPL-SCNC: 31.7 MMOL/L (ref 22–29)
CREAT SERPL-MCNC: 0.71 MG/DL (ref 0.57–1)
EGFRCR SERPLBLD CKD-EPI 2021: 91.6 ML/MIN/1.73
EOSINOPHIL # BLD AUTO: 0.1 10*3/MM3 (ref 0–0.4)
EOSINOPHIL NFR BLD AUTO: 2.3 % (ref 0.3–6.2)
ERYTHROCYTE [DISTWIDTH] IN BLOOD BY AUTOMATED COUNT: 12.6 % (ref 12.3–15.4)
GLOBULIN SER CALC-MCNC: 2.1 GM/DL
GLUCOSE SERPL-MCNC: 108 MG/DL (ref 65–99)
HCT VFR BLD AUTO: 42.6 % (ref 34–46.6)
HDLC SERPL-MCNC: 106 MG/DL (ref 40–60)
HGB BLD-MCNC: 14.1 G/DL (ref 12–15.9)
IMM GRANULOCYTES # BLD AUTO: 0.01 10*3/MM3 (ref 0–0.05)
IMM GRANULOCYTES NFR BLD AUTO: 0.2 % (ref 0–0.5)
LDLC SERPL CALC-MCNC: 143 MG/DL (ref 0–100)
LDLC/HDLC SERPL: 1.32 {RATIO}
LYMPHOCYTES # BLD AUTO: 1.28 10*3/MM3 (ref 0.7–3.1)
LYMPHOCYTES NFR BLD AUTO: 28.8 % (ref 19.6–45.3)
MCH RBC QN AUTO: 31.1 PG (ref 26.6–33)
MCHC RBC AUTO-ENTMCNC: 33.1 G/DL (ref 31.5–35.7)
MCV RBC AUTO: 93.8 FL (ref 79–97)
MONOCYTES # BLD AUTO: 0.5 10*3/MM3 (ref 0.1–0.9)
MONOCYTES NFR BLD AUTO: 11.3 % (ref 5–12)
NEUTROPHILS # BLD AUTO: 2.5 10*3/MM3 (ref 1.7–7)
NEUTROPHILS NFR BLD AUTO: 56.3 % (ref 42.7–76)
NRBC BLD AUTO-RTO: 0 /100 WBC (ref 0–0.2)
PLATELET # BLD AUTO: 328 10*3/MM3 (ref 140–450)
POTASSIUM SERPL-SCNC: 5.5 MMOL/L (ref 3.5–5.2)
PROT SERPL-MCNC: 6.6 G/DL (ref 6–8.5)
RBC # BLD AUTO: 4.54 10*6/MM3 (ref 3.77–5.28)
SODIUM SERPL-SCNC: 138 MMOL/L (ref 136–145)
T4 FREE SERPL-MCNC: 0.82 NG/DL (ref 0.93–1.7)
TRIGL SERPL-MCNC: 53 MG/DL (ref 0–150)
TSH SERPL DL<=0.005 MIU/L-ACNC: 3.38 UIU/ML (ref 0.27–4.2)
VLDLC SERPL CALC-MCNC: 8 MG/DL (ref 5–40)
WBC # BLD AUTO: 4.44 10*3/MM3 (ref 3.4–10.8)

## 2023-09-06 NOTE — PROGRESS NOTES
The ABCs of the Annual Wellness Visit  Subsequent Medicare Wellness Visit    Subjective    {Wrapup  Review (Popup)  Advance Care Planning  Labs  CC  Problem List  Visit Diagnosis  Medications  Result Review  Imaging  [facility]  [facility]  SnapShot  Encounters  Notes  Media  Procedures :23}  Melany Lloyd is a 72 y.o. female who presents for a Subsequent Medicare Wellness Visit.    The following portions of the patient's history were reviewed and   updated as appropriate: allergies, current medications, past family history, past medical history, past social history, past surgical history, and problem list.    Compared to one year ago, the patient feels her physical   health is the same.    Compared to one year ago, the patient feels her mental   health is the same.    Recent Hospitalizations:  She was not admitted to the hospital during the last year.       Current Medical Providers:  Patient Care Team:  Jose Alberto Kovacs MD as PCP - General (Family Medicine)  Luz Maria Espinosa MD as Consulting Physician (Rheumatology)  Christian Sanders MD as Surgeon (Orthopedic Surgery)  Page Bro MD as Consulting Physician (Ophthalmology)  Tamy Carrero MD as Consulting Physician (Urogynecology)  Lorraine Mejia MD as Consulting Physician (Gastroenterology)  Chiquis Duff MD as Consulting Physician (Ophthalmology)    Outpatient Medications Prior to Visit   Medication Sig Dispense Refill    B Complex Vitamins (vitamin b complex) capsule capsule Take 1 capsule by mouth Daily. PT HOLDING FOR SURGERY      Cholecalciferol (VITAMIN D) 2000 UNITS capsule Take 5,000 Units by mouth Daily. HOLDING FOR SURGERY      estradiol (ESTRACE) 0.1 MG/GM vaginal cream Insert 2 g into the vagina Daily.      Flaxseed, Linseed, (FLAX SEED OIL PO) Take 1 capsule by mouth Daily. HOLDING FOR SURGERY      magnesium oxide (MAG-OX) 400 MG tablet Take 250 mg by mouth Daily. PT HOLDING FOR SURGERY       MILK THISTLE PO Take 1 capsule by mouth 2 (Two) Times a Day. HOLDING FOR SURGERY      Omega-3 Fatty Acids (FISH OIL PO) Take 1 tablet by mouth 2 (Two) Times a Day. HOLDING FOR SURGERY      Probiotic Product (PROBIOTIC ADVANCED PO) Take 1 capsule by mouth Daily.      Restasis 0.05 % ophthalmic emulsion Administer 1 drop to both eyes Every 12 (Twelve) Hours.       No facility-administered medications prior to visit.       No opioid medication identified on active medication list. I have reviewed chart for other potential  high risk medication/s and harmful drug interactions in the elderly.        Aspirin is not on active medication list.  Aspirin use is not indicated based on review of current medical condition/s. Risk of harm outweighs potential benefits.  .    Patient Active Problem List   Diagnosis    GERD (gastroesophageal reflux disease)    Systemic lupus erythematosus    DDD (degenerative disc disease), lumbosacral    Fibromyalgia    Familial tremor    Abnormal MRA, brain    Cervical disc disorder at C5-C6 level with radiculopathy    Aneurysm of other specified arteries    Solitary pulmonary nodule    Vitamin D deficiency    Mild episode of depression    Elevated liver enzymes    Body mass index (bmi) 25.0-25.9, adult    Osteoarthritis of both feet    Vaginal atrophy    Female dyspareunia    OAB (overactive bladder)    Hot flashes    Family history of colon cancer    Osteoarthritis    Prolapse of vaginal vault after hysterectomy    Cystocele, midline    Cystocele, lateral    Vaginal enterocele, congenital or acquired    Rectocele    Female stress incontinence    Complication of implanted vaginal mesh, initial encounter    NSAID long-term use    Other fatigue    Fecal urgency    Acute diarrhea    LLQ pain     Advance Care Planning   Advance Care Planning     Advance Directive is on file.  ACP discussion was held with the patient during this visit. Patient has an advance directive in EMR which is still valid.  "     Objective    There were no vitals filed for this visit.  Estimated body mass index is 22.99 kg/m² as calculated from the following:    Height as of 23: 172.7 cm (68\").    Weight as of 23: 68.6 kg (151 lb 3.2 oz).    BMI is within normal parameters. No other follow-up for BMI required.      Does the patient have evidence of cognitive impairment?   No    Lab Results   Component Value Date    CHLPL 257 (H) 2023    TRIG 53 2023     (H) 2023     (H) 2023    VLDL 8 2023          HEALTH RISK ASSESSMENT    Smoking Status:  Social History     Tobacco Use   Smoking Status Never   Smokeless Tobacco Never     Alcohol Consumption:  Social History     Substance and Sexual Activity   Alcohol Use Yes    Comment: One drink every couple of months     Fall Risk Screen:    JJ Fall Risk Assessment has not been completed.    Depression Screenin/10/2022     1:49 PM   PHQ-2/PHQ-9 Depression Screening   Little Interest or Pleasure in Doing Things 0-->not at all   Feeling Down, Depressed or Hopeless 0-->not at all   PHQ-9: Brief Depression Severity Measure Score 0       Health Habits and Functional and Cognitive Screenin/10/2022     1:00 PM   Functional & Cognitive Status   Do you have difficulty preparing food and eating? No   Do you have difficulty bathing yourself, getting dressed or grooming yourself? No   Do you have difficulty using the toilet? No   Do you have difficulty moving around from place to place? No   Do you have trouble with steps or getting out of a bed or a chair? No   Current Diet Well Balanced Diet   Dental Exam Up to date   Eye Exam Up to date   Exercise (times per week) 0 times per week   Current Exercises Include No Regular Exercise   Do you need help using the phone?  No   Are you deaf or do you have serious difficulty hearing?  No   Do you need help to go to places out of walking distance? No   Do you need help shopping? No   Do you " need help preparing meals?  No   Do you need help with housework?  No   Do you need help with laundry? No   Do you need help taking your medications? No   Do you need help managing money? No   Do you ever drive or ride in a car without wearing a seat belt? No   Have you felt unusual stress, anger or loneliness in the last month? No   Who do you live with? Spouse   If you need help, do you have trouble finding someone available to you? Yes   Have you been bothered in the last four weeks by sexual problems? No       Age-appropriate Screening Schedule:  Refer to the list below for future screening recommendations based on patient's age, sex and/or medical conditions. Orders for these recommended tests are listed in the plan section. The patient has been provided with a written plan.    Health Maintenance   Topic Date Due    ANNUAL WELLNESS VISIT  08/10/2023    INFLUENZA VACCINE  10/01/2023    MAMMOGRAM  01/25/2025    DXA SCAN  01/25/2025    TDAP/TD VACCINES (3 - Td or Tdap) 02/04/2026    COLORECTAL CANCER SCREENING  06/09/2028    HEPATITIS C SCREENING  Completed    COVID-19 Vaccine  Completed    Pneumococcal Vaccine 65+  Completed    ZOSTER VACCINE  Completed    PAP SMEAR  Discontinued                  CMS Preventative Services Quick Reference  Risk Factors Identified During Encounter:    None Identified    The above risks/problems have been discussed with the patient.  Pertinent information has been shared with the patient in the After Visit Summary.    Diagnoses and all orders for this visit:    1. Medicare annual wellness visit, subsequent (Primary)        Follow Up:   Next Medicare Wellness visit to be scheduled in 1 year.      An After Visit Summary and PPPS were made available to the patient.

## 2023-09-07 ENCOUNTER — OFFICE VISIT (OUTPATIENT)
Dept: FAMILY MEDICINE CLINIC | Facility: CLINIC | Age: 73
End: 2023-09-07
Payer: MEDICARE

## 2023-09-07 VITALS
BODY MASS INDEX: 22.88 KG/M2 | WEIGHT: 151 LBS | HEIGHT: 68 IN | DIASTOLIC BLOOD PRESSURE: 82 MMHG | HEART RATE: 66 BPM | TEMPERATURE: 98 F | OXYGEN SATURATION: 95 % | RESPIRATION RATE: 16 BRPM | SYSTOLIC BLOOD PRESSURE: 137 MMHG

## 2023-09-07 DIAGNOSIS — R06.00 DYSPNEA, UNSPECIFIED TYPE: ICD-10-CM

## 2023-09-07 DIAGNOSIS — R00.2 HEART PALPITATIONS: ICD-10-CM

## 2023-09-07 DIAGNOSIS — Z00.00 MEDICARE ANNUAL WELLNESS VISIT, SUBSEQUENT: Primary | ICD-10-CM

## 2023-09-07 DIAGNOSIS — I72.8 SPLENIC ARTERY ANEURYSM: ICD-10-CM

## 2023-09-07 NOTE — PATIENT INSTRUCTIONS
Medicare Wellness  Personal Prevention Plan of Service     Date of Office Visit:    Encounter Provider:  Jose Alberto Kovacs MD  Place of Service:  [facility]  Patient Name: Melany Lloyd  :  1950    As part of the Medicare Wellness portion of your visit today, we are providing you with this personalized preventive plan of services (PPPS). This plan is based upon recommendations of the [place] Preventive Services Task Force (USPSTF) and the Advisory Committee on Immunization Practices (ACIP).    This lists the preventive care services that should be considered, and provides dates of when you are due. Items listed as completed are up-to-date and do not require any further intervention.    Health Maintenance   Topic Date Due    INFLUENZA VACCINE  10/01/2023    ANNUAL WELLNESS VISIT  2024    MAMMOGRAM  2025    DXA SCAN  2025    TDAP/TD VACCINES (3 - Td or Tdap) 2026    COLORECTAL CANCER SCREENING  2028    HEPATITIS C SCREENING  Completed    COVID-19 Vaccine  Completed    Pneumococcal Vaccine 65+  Completed    ZOSTER VACCINE  Completed    PAP SMEAR  Discontinued       Orders Placed This Encounter   Procedures    CT abdomen w contrast     Standing Status:   Future     Standing Expiration Date:   2024     Order Specific Question:   Will Oral Contrast be needed for this procedure?     Answer:   No     Order Specific Question:   Release to patient     Answer:   Routine Release [[id number]]    Ambulatory Referral to Cardiology     Referral Priority:   Urgent     Referral Type:   Consultation     Referral Reason:   Specialty Services Required     Referred to Provider:   Troy Infante MD     Requested Specialty:   Cardiology     Number of Visits Requested:   1       Return in about 1 year (around 2024) for Recheck.

## 2023-09-07 NOTE — PROGRESS NOTES
The ABCs of the Annual Wellness Visit  Subsequent Medicare Wellness Visit    Subjective    Melany Lloyd is a 72 y.o. female who presents for a Subsequent Medicare Wellness Visit.    The following portions of the patient's history were reviewed and   updated as appropriate: allergies, current medications, past family history, past medical history, past social history, past surgical history, and problem list.    Compared to one year ago, the patient feels her physical   health is the same.    Compared to one year ago, the patient feels her mental   health is the same.    Recent Hospitalizations:  She was not admitted to the hospital during the last year.       Current Medical Providers:  Patient Care Team:  Jose Alberto Kovacs MD as PCP - General (Family Medicine)  Luz Maria Espinosa MD as Consulting Physician (Rheumatology)  Christian Sanders MD as Surgeon (Orthopedic Surgery)  Page Bro MD as Consulting Physician (Ophthalmology)  Tamy Carrero MD as Consulting Physician (Urogynecology)  Lorraine Mejia MD as Consulting Physician (Gastroenterology)  Chiquis Duff MD as Consulting Physician (Ophthalmology)    Outpatient Medications Prior to Visit   Medication Sig Dispense Refill    B Complex Vitamins (vitamin b complex) capsule capsule Take 1 capsule by mouth Daily. PT HOLDING FOR SURGERY      Cholecalciferol (VITAMIN D) 2000 UNITS capsule Take 5,000 Units by mouth Daily. HOLDING FOR SURGERY      estradiol (ESTRACE) 0.1 MG/GM vaginal cream Insert 2 g into the vagina Daily.      Flaxseed, Linseed, (FLAX SEED OIL PO) Take 1 capsule by mouth Daily. HOLDING FOR SURGERY      magnesium oxide (MAG-OX) 400 MG tablet Take 250 mg by mouth Daily. PT HOLDING FOR SURGERY      MILK THISTLE PO Take 1 capsule by mouth 2 (Two) Times a Day. HOLDING FOR SURGERY      Omega-3 Fatty Acids (FISH OIL PO) Take 1 tablet by mouth 2 (Two) Times a Day. HOLDING FOR SURGERY      Probiotic Product (PROBIOTIC ADVANCED PO) Take 1 capsule by  "mouth Daily.      Restasis 0.05 % ophthalmic emulsion Administer 1 drop to both eyes Every 12 (Twelve) Hours.       No facility-administered medications prior to visit.       No opioid medication identified on active medication list. I have reviewed chart for other potential  high risk medication/s and harmful drug interactions in the elderly.        Aspirin is not on active medication list.  Aspirin use is not indicated based on review of current medical condition/s. Risk of harm outweighs potential benefits.  .    Patient Active Problem List   Diagnosis    GERD (gastroesophageal reflux disease)    Systemic lupus erythematosus    DDD (degenerative disc disease), lumbosacral    Fibromyalgia    Familial tremor    Abnormal MRA, brain    Cervical disc disorder at C5-C6 level with radiculopathy    Splenic artery aneurysm    Solitary pulmonary nodule    Vitamin D deficiency    Mild episode of depression    Elevated liver enzymes    Body mass index (bmi) 25.0-25.9, adult    Osteoarthritis of both feet    Vaginal atrophy    Female dyspareunia    OAB (overactive bladder)    Hot flashes    Family history of colon cancer    Osteoarthritis    Prolapse of vaginal vault after hysterectomy    Cystocele, midline    Cystocele, lateral    Vaginal enterocele, congenital or acquired    Rectocele    Female stress incontinence    Complication of implanted vaginal mesh, initial encounter    NSAID long-term use    Other fatigue    Fecal urgency    Acute diarrhea    LLQ pain     Advance Care Planning   Advance Care Planning     Advance Directive is on file.  ACP discussion was held with the patient during this visit. Patient has an advance directive in EMR which is still valid.      Objective    Vitals:    09/07/23 1004   BP: 137/82   BP Location: Left arm   Patient Position: Sitting   Pulse: 66   Resp: 16   Temp: 98 °F (36.7 °C)   TempSrc: Oral   SpO2: 95%   Weight: 68.5 kg (151 lb)   Height: 172.7 cm (68\")     Estimated body mass index " "is 22.96 kg/m² as calculated from the following:    Height as of this encounter: 172.7 cm (68\").    Weight as of this encounter: 68.5 kg (151 lb).    BMI is within normal parameters. No other follow-up for BMI required.      Does the patient have evidence of cognitive impairment? No    Lab Results   Component Value Date    CHLPL 257 (H) 2023    TRIG 53 2023     (H) 2023     (H) 2023    VLDL 8 2023        HEALTH RISK ASSESSMENT    Smoking Status:  Social History     Tobacco Use   Smoking Status Never   Smokeless Tobacco Never     Alcohol Consumption:  Social History     Substance and Sexual Activity   Alcohol Use Yes    Comment: One drink every couple of months     Fall Risk Screen:    JJ Fall Risk Assessment has not been completed.    Depression Screenin/7/2023    10:08 AM   PHQ-2/PHQ-9 Depression Screening   Little Interest or Pleasure in Doing Things 0-->not at all   Feeling Down, Depressed or Hopeless 0-->not at all   PHQ-9: Brief Depression Severity Measure Score 0       Health Habits and Functional and Cognitive Screenin/7/2023    10:00 AM   Functional & Cognitive Status   Do you have difficulty preparing food and eating? No   Do you have difficulty bathing yourself, getting dressed or grooming yourself? No   Do you have difficulty using the toilet? No   Do you have difficulty moving around from place to place? No   Do you have trouble with steps or getting out of a bed or a chair? No   Current Diet Well Balanced Diet   Dental Exam Up to date   Eye Exam Up to date   Exercise (times per week) 1 times per week   Current Exercises Include Walking   Do you need help using the phone?  No   Are you deaf or do you have serious difficulty hearing?  Yes   Do you need help to go to places out of walking distance? No   Do you need help shopping? No   Do you need help preparing meals?  No   Do you need help with housework?  No   Do you need help with " laundry? No   Do you need help taking your medications? No   Do you need help managing money? No   Do you ever drive or ride in a car without wearing a seat belt? No   Have you felt unusual stress, anger or loneliness in the last month? Yes   Who do you live with? Spouse   If you need help, do you have trouble finding someone available to you? No   Have you been bothered in the last four weeks by sexual problems? No   Do you have difficulty concentrating, remembering or making decisions? No       Age-appropriate Screening Schedule:  Refer to the list below for future screening recommendations based on patient's age, sex and/or medical conditions. Orders for these recommended tests are listed in the plan section. The patient has been provided with a written plan.    Health Maintenance   Topic Date Due    INFLUENZA VACCINE  10/01/2023    ANNUAL WELLNESS VISIT  09/07/2024    MAMMOGRAM  01/25/2025    DXA SCAN  01/25/2025    TDAP/TD VACCINES (3 - Td or Tdap) 02/04/2026    COLORECTAL CANCER SCREENING  06/09/2028    HEPATITIS C SCREENING  Completed    COVID-19 Vaccine  Completed    Pneumococcal Vaccine 65+  Completed    ZOSTER VACCINE  Completed    PAP SMEAR  Discontinued                  CMS Preventative Services Quick Reference  Risk Factors Identified During Encounter  None Identified  The above risks/problems have been discussed with the patient.  Pertinent information has been shared with the patient in the After Visit Summary.  An After Visit Summary and PPPS were made available to the patient.    Follow Up:   Next Medicare Wellness visit to be scheduled in 1 year.       Additional E&M Note during same encounter follows:  Patient has multiple medical problems which are significant and separately identifiable that require additional work above and beyond the Medicare Wellness Visit.      Chief Complaint  Medicare Wellness-subsequent    Subjective        HPI  Melany Lloyd is also being seen today for management of  "prior splenic artery aneurysm and some chronic episodes of sudden-onset dyspnea, feeling of \"faintness\", and some heart fluttering.     Patient found this 1 cm splenic aneurysm on a CAT scan incidentally back in 2016 and needs further follow-up on that.  At the time they also found a pulmonary nodule, but subsequent CT scans proved stability over time and thus it was noted as benign.  Patient has a history of fluctuating potassium levels, and has seen endocrinology in the past for this, and does note she eats a lot of greens, including almost daily spinach intake.    Review of Systems   Constitutional:  Negative for chills and fever.   HENT:  Negative for congestion and sinus pressure.    Eyes:  Negative for visual disturbance.   Respiratory:  Positive for shortness of breath (at times). Negative for cough.    Cardiovascular:  Negative for chest pain.   Gastrointestinal:  Negative for abdominal pain.   Genitourinary:  Negative for dysuria.   Skin:  Negative for rash.   Hematological:  Negative for adenopathy.   Psychiatric/Behavioral:  Negative for dysphoric mood.      Objective   Vital Signs:  /82 (BP Location: Left arm, Patient Position: Sitting)   Pulse 66   Temp 98 °F (36.7 °C) (Oral)   Resp 16   Ht 172.7 cm (68\")   Wt 68.5 kg (151 lb)   SpO2 95%   BMI 22.96 kg/m²     Physical Exam  Constitutional:       General: She is not in acute distress.     Appearance: She is well-developed.   Cardiovascular:      Rate and Rhythm: Normal rate and regular rhythm.   Pulmonary:      Effort: Pulmonary effort is normal.      Breath sounds: Normal breath sounds.   Neurological:      Mental Status: She is alert and oriented to person, place, and time.   Psychiatric:         Behavior: Behavior normal.         Thought Content: Thought content normal.        The following data was reviewed by: Jose Alberto Kovacs MD on 09/07/2023:  Common labs          3/22/2023    09:53 8/23/2023    09:16   Common Labs   Glucose 106  108  "   BUN 15  22    Creatinine 0.57  0.71    Sodium 138  138    Potassium 4.7  5.5    Chloride 99  100    Calcium 9.3  9.8    Total Protein 7.0  6.6    Albumin 4.5  4.5    Total Bilirubin 0.4  0.4    Alkaline Phosphatase 46  42    AST (SGOT) 41  41    ALT (SGPT) 24  26    WBC 5.9  4.44    Hemoglobin 13.9  14.1    Hematocrit 41.0  42.6    Platelets 314  328    Total Cholesterol  257    Triglycerides  53    HDL Cholesterol  106    LDL Cholesterol   143                 Assessment and Plan   Diagnoses and all orders for this visit:    1. Medicare annual wellness visit, subsequent (Primary)    2. Splenic artery aneurysm  -     CT abdomen w contrast; Future    3. Dyspnea, unspecified type  -     Ambulatory Referral to Cardiology    4. Heart palpitations  -     Ambulatory Referral to Cardiology    Patient asked to cut back somewhat on her greens intake and hydrate well.  Also we will continue to monitor her thyroid levels, as her free T4 is slightly low today however not far off of where she was prior and her TSH is normal.       I spent 15 minutes caring for Melany on this date of service. This time includes time spent by me in the following activities:preparing for the visit, reviewing tests, performing a medically appropriate examination and/or evaluation , ordering medications, tests, or procedures, and documenting information in the medical record  Follow Up   No follow-ups on file.  Patient was given instructions and counseling regarding her condition or for health maintenance advice. Please see specific information pulled into the AVS if appropriate.

## 2023-09-20 ENCOUNTER — HOSPITAL ENCOUNTER (OUTPATIENT)
Dept: SLEEP MEDICINE | Facility: HOSPITAL | Age: 73
End: 2023-09-20
Payer: MEDICARE

## 2023-09-20 DIAGNOSIS — G47.8 NON-RESTORATIVE SLEEP: ICD-10-CM

## 2023-09-20 DIAGNOSIS — G47.10 HYPERSOMNIA: ICD-10-CM

## 2023-09-20 DIAGNOSIS — G47.30 SLEEP APNEA, UNSPECIFIED TYPE: ICD-10-CM

## 2023-09-20 DIAGNOSIS — R41.3 MEMORY CHANGES: ICD-10-CM

## 2023-09-20 DIAGNOSIS — R06.83 SNORING: ICD-10-CM

## 2023-09-20 PROCEDURE — 95806 SLEEP STUDY UNATT&RESP EFFT: CPT

## 2023-10-04 ENCOUNTER — TELEMEDICINE (OUTPATIENT)
Dept: SLEEP MEDICINE | Facility: HOSPITAL | Age: 73
End: 2023-10-04
Payer: MEDICARE

## 2023-10-04 DIAGNOSIS — G47.33 MILD OBSTRUCTIVE SLEEP APNEA: Primary | ICD-10-CM

## 2023-10-04 NOTE — PROGRESS NOTES
Follow Up [facility] Note     Chief Complaint:  CHRISTOPHE     This visit was completed via video.  Telehealth visit with audio component.  All issues as below were discussed and addressed but no physical exam was performed.  If it was felt that the patient should be evaluated in clinic then they were directed there.  The patient verbally consented to visit.      Primary Care Physician: Jose Alberto Kovacs MD    Interval History:   The patient is a 73 y.o. female  who was last seen in the sleep lab: 9/20/2023 for home sleep study which showed overall AHI of 8.1 events per hour with lowest SPO2 of 76%.  At patient's first visit noted sleep maintenance insomnia, hypersomnia nonrestorative sleep, more sleepy when she increase her sleep time and restless sleep.  ESS was noted to be 9 at last visit.  Presents today to discuss results and treatment options; CPAP versus oral mandibular device. Patient spent majority of sleep time during study in supine position.  Average SPO2 91%.    Review of Systems:    A complete review of systems was done and all were negative with the exception of all negative    Social History:    Social History     Socioeconomic History    Marital status:    Tobacco Use    Smoking status: Never    Smokeless tobacco: Never   Vaping Use    Vaping Use: Never used   Substance and Sexual Activity    Alcohol use: Yes     Comment: One drink every couple of months    Drug use: No    Sexual activity: Not Currently     Partners: Male     Birth control/protection: Surgical, Hysterectomy       Allergies:  Diphenhydramine hcl, Penicillins, and Red dye     Medication Review:  Reviewed.      Impression:   1. Mild obstructive sleep apnea      Patient does continue to endorse some nonrestorative sleep; difficult to avoid sleeping on back due to back pain issues.  Amenable to referral to dental sleep specialist to consider oral mandibular device.  She does not wear any dentures.    Plan:  Good sleep hygiene  measures should be maintained.        I answered all of the patient's questions.        Alma Jack MD  Sleep Medicine  10/04/23  10:46 EDT

## 2023-10-12 ENCOUNTER — HOSPITAL ENCOUNTER (OUTPATIENT)
Dept: CT IMAGING | Facility: HOSPITAL | Age: 73
Discharge: HOME | End: 2023-10-12
Admitting: FAMILY MEDICINE
Payer: MEDICARE

## 2023-10-12 DIAGNOSIS — I72.8 SPLENIC ARTERY ANEURYSM: ICD-10-CM

## 2023-10-12 PROCEDURE — 74160 CT ABDOMEN W/CONTRAST: CPT

## 2023-10-12 PROCEDURE — 82565 ASSAY OF CREATININE: CPT

## 2023-10-12 PROCEDURE — 25510000001 IOPAMIDOL 61 % SOLUTION: Performed by: FAMILY MEDICINE

## 2023-10-12 RX ADMIN — IOPAMIDOL 100 ML: 612 INJECTION, SOLUTION INTRAVENOUS at 11:23

## 2023-10-13 LAB — CREAT BLDA-MCNC: 0.6 MG/DL (ref 0.6–1.3)

## 2023-10-26 ENCOUNTER — OFFICE VISIT (OUTPATIENT)
Dept: CARDIOLOGY | Facility: CLINIC | Age: 73
End: 2023-10-26
Payer: MEDICARE

## 2023-10-26 VITALS
HEART RATE: 65 BPM | SYSTOLIC BLOOD PRESSURE: 128 MMHG | WEIGHT: 153 LBS | HEIGHT: 68 IN | BODY MASS INDEX: 23.19 KG/M2 | DIASTOLIC BLOOD PRESSURE: 80 MMHG

## 2023-10-26 DIAGNOSIS — R55 NEAR SYNCOPE: Primary | ICD-10-CM

## 2023-10-26 PROCEDURE — 99204 OFFICE O/P NEW MOD 45 MIN: CPT | Performed by: INTERNAL MEDICINE

## 2023-10-26 PROCEDURE — 93000 ELECTROCARDIOGRAM COMPLETE: CPT | Performed by: INTERNAL MEDICINE

## 2023-10-27 NOTE — PROGRESS NOTES
"      CARDIOLOGY    Troy Infante MD    ENCOUNTER DATE:  10/26/2023    Melany Lloyd / 73 y.o. / female        CHIEF COMPLAINT / REASON FOR OFFICE VISIT     Shortness of Breath and Palpitations      HISTORY OF PRESENT ILLNESS       Shortness of Breath    Palpitations   Associated symptoms include shortness of breath.     Melany Lloyd is a 73 y.o. female who presents today for consultation.  Patient said in August she had several episodes where she felt like she could faint.  She never really lost consciousness but just felt that way.  She is also been having occasional pressure sensation in her chest as well as occasional electrical sensation that will occur randomly throughout her body.  It can occur in her head and her arm or anywhere.  The symptoms were not associated with the fainting feeling.  She was recently diagnosed with mild sleep apnea and was currently being set up for a dental device.  She said in the month of September and October she has not had any recurrent symptoms.      The following portions of the patient's history were reviewed and updated as appropriate: allergies, current medications, past family history, past medical history, past social history, past surgical history and problem list.      VITAL SIGNS     Visit Vitals  /80 (BP Location: Left arm)   Pulse 65   Ht 172.7 cm (68\")   Wt 69.4 kg (153 lb)   LMP  (LMP Unknown)   BMI 23.26 kg/m²         Wt Readings from Last 3 Encounters:   10/26/23 69.4 kg (153 lb)   09/07/23 68.5 kg (151 lb)   07/12/23 68.6 kg (151 lb 3.2 oz)     Body mass index is 23.26 kg/m².      REVIEW OF SYSTEMS   Review of Systems   Cardiovascular:  Positive for palpitations.   Respiratory:  Positive for shortness of breath.            PHYSICAL EXAMINATION     Vitals reviewed.   Constitutional:       Appearance: Healthy appearance.   Pulmonary:      Effort: Pulmonary effort is normal.   Cardiovascular:      Normal rate. Regular rhythm. Normal S1. Normal S2.  "      Murmurs: There is no murmur.      No gallop.  No click. No rub.   Pulses:     Intact distal pulses.   Edema:     Peripheral edema absent.   Neurological:      Mental Status: Alert.           REVIEWED DATA       ECG 12 Lead    Date/Time: 10/26/2023 10:24 AM  Performed by: Troy Infante MD    Authorized by: Troy Infante MD  Comparison: compared with previous ECG from 12/22/2021  Comparison to previous ECG: APC is new.  Rhythm: sinus rhythm  Ectopy: atrial premature contractions    Clinical impression: abnormal EKG          Cardiac Procedures:      Lipid Panel          8/23/2023    09:16   Lipid Panel   Total Cholesterol 257    Triglycerides 53    HDL Cholesterol 106    VLDL Cholesterol 8    LDL Cholesterol  143    LDL/HDL Ratio 1.32          ASSESSMENT & PLAN      Diagnosis Plan   1. Near syncope  Holter Monitor - 24 Hour    Adult Transthoracic Echo Complete W/ Cont if Necessary Per Protocol            SUMMARY/DISCUSSION  Patient with near syncope.  I really could not elicit an etiology for why she was having this.  I am going to set her up for an echo as well as a Holter monitor to rule out structural and arrhythmia causes but she has not had any symptoms in almost 2 months now.  If this work-up is negative I would follow her clinically and see what evolves.  If her symptoms start reoccurring she was told to contact my office soon as possible.  Patient is ECG she did have a PAC that was noted although she was asymptomatic with that.        MEDICATIONS         Discharge Medications            Accurate as of October 26, 2023 11:59 PM. If you have any questions, ask your nurse or doctor.                Continue These Medications        Instructions Start Date   Diclofenac Sodium 1 % gel gel  Commonly known as: VOLTAREN   4 g, Topical, 4 Times Daily PRN      estradiol 0.1 MG/GM vaginal cream  Commonly known as: ESTRACE   2 g, Vaginal, Daily      FISH OIL PO   1 tablet, Oral, 2 Times Daily, HOLDING FOR  SURGERY      FLAX SEED OIL PO   1 capsule, Oral, Daily, HOLDING FOR SURGERY      IMODIUM A-D PO   Oral      MILK THISTLE PO   1 capsule, Oral, 2 Times Daily, HOLDING FOR SURGERY      PROBIOTIC ADVANCED PO   1 capsule, Oral, Daily      Restasis 0.05 % ophthalmic emulsion  Generic drug: cycloSPORINE   1 drop, Both Eyes, Every 12 Hours      vitamin b complex capsule capsule   1 capsule, Oral, Daily, PT HOLDING FOR SURGERY       Vitamin D 50 MCG (2000 UT) capsule   5,000 Units, Oral, Daily, HOLDING FOR SURGERY             Stop These Medications      magnesium oxide 400 MG tablet  Commonly known as: MAG-OX  Stopped by: Troy Infante MD                  **Dragon Disclaimer:   Much of this encounter note is an electronic transcription/translation of spoken language to printed text. The electronic translation of spoken language may permit erroneous, or at times, nonsensical words or phrases to be inadvertently transcribed. Although I have reviewed the note for such errors, some may still exist.

## 2023-11-02 ENCOUNTER — HOSPITAL ENCOUNTER (OUTPATIENT)
Dept: CARDIOLOGY | Facility: HOSPITAL | Age: 73
Discharge: HOME | End: 2023-11-02
Payer: MEDICARE

## 2023-11-02 VITALS
DIASTOLIC BLOOD PRESSURE: 72 MMHG | WEIGHT: 153 LBS | HEIGHT: 68 IN | SYSTOLIC BLOOD PRESSURE: 104 MMHG | BODY MASS INDEX: 23.19 KG/M2 | HEART RATE: 82 BPM

## 2023-11-02 DIAGNOSIS — R55 NEAR SYNCOPE: ICD-10-CM

## 2023-11-02 LAB
AORTIC ARCH: 2.5 CM
ASCENDING AORTA: 3 CM
BH CV ECHO MEAS - ACS: 1.55 CM
BH CV ECHO MEAS - AO MAX PG: 11.3 MMHG
BH CV ECHO MEAS - AO MEAN PG: 6 MMHG
BH CV ECHO MEAS - AO ROOT DIAM: 2.6 CM
BH CV ECHO MEAS - AO V2 MAX: 168 CM/SEC
BH CV ECHO MEAS - AO V2 VTI: 36.5 CM
BH CV ECHO MEAS - AVA(I,D): 2.25 CM2
BH CV ECHO MEAS - EDV(CUBED): 74.1 ML
BH CV ECHO MEAS - EDV(MOD-SP2): 88 ML
BH CV ECHO MEAS - EDV(MOD-SP4): 85 ML
BH CV ECHO MEAS - EF(MOD-BP): 64.1 %
BH CV ECHO MEAS - EF(MOD-SP2): 64.8 %
BH CV ECHO MEAS - EF(MOD-SP4): 64.7 %
BH CV ECHO MEAS - ESV(CUBED): 14.9 ML
BH CV ECHO MEAS - ESV(MOD-SP2): 31 ML
BH CV ECHO MEAS - ESV(MOD-SP4): 30 ML
BH CV ECHO MEAS - FS: 41.5 %
BH CV ECHO MEAS - IVS/LVPW: 0.73 CM
BH CV ECHO MEAS - IVSD: 0.8 CM
BH CV ECHO MEAS - LAT PEAK E' VEL: 5.5 CM/SEC
BH CV ECHO MEAS - LV DIASTOLIC VOL/BSA (35-75): 46.6 CM2
BH CV ECHO MEAS - LV MASS(C)D: 127.8 GRAMS
BH CV ECHO MEAS - LV MAX PG: 6.4 MMHG
BH CV ECHO MEAS - LV MEAN PG: 3 MMHG
BH CV ECHO MEAS - LV SYSTOLIC VOL/BSA (12-30): 16.4 CM2
BH CV ECHO MEAS - LV V1 MAX: 126 CM/SEC
BH CV ECHO MEAS - LV V1 VTI: 26.6 CM
BH CV ECHO MEAS - LVIDD: 4.2 CM
BH CV ECHO MEAS - LVIDS: 2.46 CM
BH CV ECHO MEAS - LVOT AREA: 3.1 CM2
BH CV ECHO MEAS - LVOT DIAM: 1.98 CM
BH CV ECHO MEAS - LVPWD: 1.1 CM
BH CV ECHO MEAS - MED PEAK E' VEL: 5.7 CM/SEC
BH CV ECHO MEAS - MV A DUR: 0.11 SEC
BH CV ECHO MEAS - MV A MAX VEL: 91.3 CM/SEC
BH CV ECHO MEAS - MV DEC SLOPE: 238 CM/SEC2
BH CV ECHO MEAS - MV DEC TIME: 0.21 SEC
BH CV ECHO MEAS - MV E MAX VEL: 64.3 CM/SEC
BH CV ECHO MEAS - MV E/A: 0.7
BH CV ECHO MEAS - MV MAX PG: 3.2 MMHG
BH CV ECHO MEAS - MV MEAN PG: 1.36 MMHG
BH CV ECHO MEAS - MV P1/2T: 88.5 MSEC
BH CV ECHO MEAS - MV V2 VTI: 24.6 CM
BH CV ECHO MEAS - MVA(P1/2T): 2.48 CM2
BH CV ECHO MEAS - MVA(VTI): 3.3 CM2
BH CV ECHO MEAS - PA ACC TIME: 0.14 SEC
BH CV ECHO MEAS - PA V2 MAX: 81.4 CM/SEC
BH CV ECHO MEAS - PI END-D VEL: 99.1 CM/SEC
BH CV ECHO MEAS - PULM A REVS DUR: 0.12 SEC
BH CV ECHO MEAS - PULM A REVS VEL: 63.4 CM/SEC
BH CV ECHO MEAS - PULM DIAS VEL: 34.7 CM/SEC
BH CV ECHO MEAS - PULM S/D: 1.31
BH CV ECHO MEAS - PULM SYS VEL: 45.4 CM/SEC
BH CV ECHO MEAS - QP/QS: 0.44
BH CV ECHO MEAS - RAP SYSTOLE: 3 MMHG
BH CV ECHO MEAS - RV MAX PG: 2 MMHG
BH CV ECHO MEAS - RV V1 MAX: 70.7 CM/SEC
BH CV ECHO MEAS - RV V1 VTI: 13.4 CM
BH CV ECHO MEAS - RVOT DIAM: 1.84 CM
BH CV ECHO MEAS - RVSP: 19.6 MMHG
BH CV ECHO MEAS - SI(MOD-SP2): 31.3 ML/M2
BH CV ECHO MEAS - SI(MOD-SP4): 30.2 ML/M2
BH CV ECHO MEAS - SUP REN AO DIAM: 1.5 CM
BH CV ECHO MEAS - SV(LVOT): 82.1 ML
BH CV ECHO MEAS - SV(MOD-SP2): 57 ML
BH CV ECHO MEAS - SV(MOD-SP4): 55 ML
BH CV ECHO MEAS - SV(RVOT): 35.8 ML
BH CV ECHO MEAS - TAPSE (>1.6): 2 CM
BH CV ECHO MEAS - TR MAX PG: 16.6 MMHG
BH CV ECHO MEAS - TR MAX VEL: 203.6 CM/SEC
BH CV ECHO MEASUREMENTS AVERAGE E/E' RATIO: 11.48
BH CV XLRA - RV BASE: 2.6 CM
BH CV XLRA - RV LENGTH: 7.2 CM
BH CV XLRA - RV MID: 1.94 CM
BH CV XLRA - TDI S': 12.8 CM/SEC
LEFT ATRIUM VOLUME INDEX: 27.2 ML/M2
SINUS: 2.8 CM
STJ: 2.9 CM

## 2023-11-02 PROCEDURE — 93306 TTE W/DOPPLER COMPLETE: CPT

## 2023-11-06 NOTE — PROGRESS NOTES
Patient made aware of normal echocardiogram results.  She turned in monitor this morning and I informed her that we will call her when those results are available.

## 2023-12-19 ENCOUNTER — TELEPHONE (OUTPATIENT)
Dept: CARDIOLOGY | Facility: CLINIC | Age: 73
End: 2023-12-19

## 2023-12-21 ENCOUNTER — TRANSCRIBE ORDERS (OUTPATIENT)
Dept: ADMINISTRATIVE | Facility: HOSPITAL | Age: 73
End: 2023-12-21
Payer: MEDICARE

## 2023-12-21 DIAGNOSIS — Z12.31 ENCOUNTER FOR SCREENING MAMMOGRAM FOR BREAST CANCER: Primary | ICD-10-CM

## 2024-02-08 ENCOUNTER — HOSPITAL ENCOUNTER (OUTPATIENT)
Dept: MAMMOGRAPHY | Facility: HOSPITAL | Age: 74
Discharge: HOME | End: 2024-02-08
Admitting: FAMILY MEDICINE
Payer: MEDICARE

## 2024-02-08 PROCEDURE — 77067 SCR MAMMO BI INCL CAD: CPT

## 2024-02-08 PROCEDURE — 77063 BREAST TOMOSYNTHESIS BI: CPT

## 2024-03-26 NOTE — PROGRESS NOTES
"Subjective   Melany Lloyd is a 73 y.o. female.     CC: Allergies    Patient comes in today reporting bad Spring/Fall AR issues, including itchy eyes, sneezing, PND, mild coughing. Uses Claritin.     FH: AR         The following portions of the patient's history were reviewed and updated as appropriate: allergies, current medications, past family history, past medical history, past social history, past surgical history, and problem list.    Review of Systems   Constitutional:  Negative for activity change, chills and fever.   HENT:  Positive for congestion, postnasal drip and rhinorrhea.    Respiratory:  Positive for cough.    Cardiovascular:  Negative for chest pain.   Psychiatric/Behavioral:  Negative for dysphoric mood.        /67   Pulse 70   Temp 97.9 °F (36.6 °C) (Oral)   Resp 16   Ht 172.7 cm (68\")   Wt 68.9 kg (152 lb)   LMP  (LMP Unknown)   SpO2 95%   BMI 23.11 kg/m²     Objective   Physical Exam  Constitutional:       General: She is not in acute distress.     Appearance: She is well-developed.   Pulmonary:      Effort: Pulmonary effort is normal.   Neurological:      Mental Status: She is alert and oriented to person, place, and time.   Psychiatric:         Behavior: Behavior normal.         Thought Content: Thought content normal.         Assessment & Plan   Diagnoses and all orders for this visit:    1. Seasonal allergic rhinitis due to other allergic trigger (Primary)    Spent 15 minutes with patient reviewing the Kelly over-the-counter antihistamines as well as nasal steroids.  Also discussed Singulair, but at this point does not seem warranted.  Patient to return to clinic if change.             "

## 2024-03-27 ENCOUNTER — OFFICE VISIT (OUTPATIENT)
Dept: FAMILY MEDICINE CLINIC | Facility: CLINIC | Age: 74
End: 2024-03-27
Payer: MEDICARE

## 2024-03-27 VITALS
TEMPERATURE: 97.9 F | WEIGHT: 152 LBS | SYSTOLIC BLOOD PRESSURE: 121 MMHG | OXYGEN SATURATION: 95 % | HEART RATE: 70 BPM | HEIGHT: 68 IN | DIASTOLIC BLOOD PRESSURE: 67 MMHG | BODY MASS INDEX: 23.04 KG/M2 | RESPIRATION RATE: 16 BRPM

## 2024-03-27 DIAGNOSIS — J30.89 SEASONAL ALLERGIC RHINITIS DUE TO OTHER ALLERGIC TRIGGER: Primary | ICD-10-CM

## 2024-03-27 PROBLEM — J30.9 ALLERGIC RHINITIS DUE TO ALLERGEN: Status: ACTIVE | Noted: 2024-03-27

## 2024-03-27 PROCEDURE — 99212 OFFICE O/P EST SF 10 MIN: CPT | Performed by: FAMILY MEDICINE

## 2024-03-27 PROCEDURE — 1160F RVW MEDS BY RX/DR IN RCRD: CPT | Performed by: FAMILY MEDICINE

## 2024-03-27 PROCEDURE — 1159F MED LIST DOCD IN RCRD: CPT | Performed by: FAMILY MEDICINE

## 2024-03-27 RX ORDER — KETOCONAZOLE 20 MG/ML
SHAMPOO TOPICAL
COMMUNITY
Start: 2024-03-14

## 2024-06-30 NOTE — PROGRESS NOTES
Subjective   Melany Lloyd is a 70 y.o. female.     History of Present Illness     70-year-old female presents today via video visit for 6-month follow-up as well as follow-up on recent ear canal mass.    We discussed this in October 2020.  She was referred to ENT.  I have not yet received records.  Patient reports was told this was a skin tag.    Systemic lupus: .  Last visit was September 2020.  Per records Lupus stable despite being off Plaquenil.  They also follow her for sicca syndrome secondary to Sjogren's; considering trial of Prolia in the future for osteoporosis if repeat DEXA scan worsens.    History of solitary pulmonary nodule: CT chest February 2019 showed stable right upper lobe nodule as well as stable splenic artery aneurysm.  Was due for follow-up CT chest in 6 to 9 months; as discussed in June this order was placed however had not been done yet.  This was then done in July 2020.  No acute abnormalities nodule pulmonary stable since 2017 can be considered benign.    He will was used to help with urgent continence however not anymore.  Considering medication.  Gynecologist tried Imvexxy for hot flashes and incontinence however patient had irritation and bleeding and therefore discontinued.  Amenable to referral to uro-Gyn.    The following portions of the patient's history were reviewed and updated as appropriate: allergies, current medications, past family history, past medical history, past social history, past surgical history and problem list.    Review of Systems   Constitutional: Negative for chills and fever.   HENT: Negative for congestion.    Respiratory: Negative for cough and shortness of breath.    Cardiovascular: Negative for chest pain, palpitations and leg swelling.   Gastrointestinal: Negative for abdominal pain, diarrhea and vomiting.   Genitourinary: Positive for urinary incontinence.       Objective   Physical Exam  Constitutional:       General: She is not in acute distress.      Appearance: She is well-developed.   HENT:      Head: Normocephalic and atraumatic.   Pulmonary:      Effort: Pulmonary effort is normal. No respiratory distress.   Neurological:      Mental Status: She is alert and oriented to person, place, and time.   Psychiatric:         Behavior: Behavior normal.           No results found for: COLORU, CLARITYU, SPECGRAV, PHUR, LEUKOCYTESUR, NITRITE, PROTEINPOCUA, GLUCOSEUR, KETONESU, UROBILINOGEN, BILIRUBINUR, RBCUR      Assessment/Plan     Diagnoses and all orders for this visit:    1. Osteopenia, unspecified location (Primary)    2. Systemic lupus erythematosus, unspecified SLE type, unspecified organ involvement status (CMS/[facility])    3. Urge incontinence of urine  -     Ambulatory Referral to Gynecologic Urology    Continue care per rheumatology.    Time spent during visit: 25 minutes.        management.            REASSESSMENT          CRITICAL CARE TIME     CONSULTS:  None    PROCEDURES:  Unless otherwise noted below, none     Procedures        FINAL IMPRESSION      1. Atypical chest pain          DISPOSITION/PLAN   DISPOSITION Decision To Discharge 06/30/2024 03:56:59 PM      PATIENT REFERRED TO:  Darion López MD  [address] [place] [id number]  [phone]    Schedule an appointment as soon as possible for a visit in 3 days        DISCHARGE MEDICATIONS:  Discharge Medication List as of 6/30/2024  4:10 PM        Controlled Substances Monitoring:     RX Monitoring Attestation Periodic Controlled Substance Monitoring   11/14/2016   8:54 PM The Prescription Monitoring Report for this patient was reviewed today. No signs of potential drug abuse or diversion identified.       (Please note that portions of this note were completed with a voice recognition program.  Efforts were made to edit the dictations but occasionally words are mis-transcribed.)    Mona Wolfe DO (electronically signed)  Attending Emergency Physician           Mona Wolfe DO  06/30/24 [id number]

## 2024-07-27 NOTE — PROGRESS NOTES
"Subjective   Melany Lloyd is a 73 y.o. female.     CC: Leg Pain    History of Present Illness     Pt comes in today reporting left leg pain that started in the left buttock region and can radiate to the left leg (2/10 scale of pain). Pt also has a h/o varicose veins and these hurt at times, too. The radiating pain is 1+ years.  Patient does see pain management, and had a MRI of the lumbar spine in March 2023, reviewed by me at today's visit, showing some L5-S1 and other multilevel degeneration, at that time worse on the right, and did have a CT scan of the abdomen and pelvis in October 2023, reviewed by me at today's visit, showing advanced changes again at the L5-S1, although report does not signify a side at that point.  In the meantime, patient has had 2 falls, most recently in December, and today reports that the pain runs down the left buttock down the left hamstring at times.      The following portions of the patient's history were reviewed and updated as appropriate: allergies, current medications, past family history, past medical history, past social history, past surgical history, and problem list.    Review of Systems   Constitutional:  Negative for activity change, chills and fever.   Respiratory:  Negative for cough.    Cardiovascular:  Negative for chest pain.   Psychiatric/Behavioral:  Negative for dysphoric mood.        /66   Pulse 68   Temp 97.7 °F (36.5 °C) (Oral)   Resp 14   Ht 172.7 cm (68\")   Wt 71.2 kg (157 lb)   LMP  (LMP Unknown)   SpO2 97%   BMI 23.87 kg/m²     Objective   Physical Exam  Vitals and nursing note reviewed.   Constitutional:       General: She is not in acute distress.     Appearance: She is well-developed.   Pulmonary:      Effort: Pulmonary effort is normal.   Neurological:      Mental Status: She is alert and oriented to person, place, and time.   Psychiatric:         Behavior: Behavior normal.         Thought Content: Thought content normal. "         Assessment & Plan   Diagnoses and all orders for this visit:    1. Lumbar radiculopathy (Primary)  -     methylPREDNISolone (Medrol) 4 MG dose pack; follow package directions  Dispense: 21 tablet; Refill: 0  -     MRI Lumbar Spine Without Contrast; Future    Will go ahead and reimage the area due to her known issues as well as the traumas.  Patient to use this information when she sees her pain management doctor in September for further guidance.  Patient cannot use NSAIDs per her nephrologist, thus we will give a short round of steroids.

## 2024-07-29 ENCOUNTER — OFFICE VISIT (OUTPATIENT)
Dept: FAMILY MEDICINE CLINIC | Facility: CLINIC | Age: 74
End: 2024-07-29
Payer: MEDICARE

## 2024-07-29 VITALS
HEIGHT: 68 IN | TEMPERATURE: 97.7 F | DIASTOLIC BLOOD PRESSURE: 66 MMHG | WEIGHT: 157 LBS | BODY MASS INDEX: 23.79 KG/M2 | HEART RATE: 68 BPM | OXYGEN SATURATION: 97 % | RESPIRATION RATE: 14 BRPM | SYSTOLIC BLOOD PRESSURE: 116 MMHG

## 2024-07-29 DIAGNOSIS — M54.16 LUMBAR RADICULOPATHY: Primary | ICD-10-CM

## 2024-07-29 PROCEDURE — 1160F RVW MEDS BY RX/DR IN RCRD: CPT | Performed by: FAMILY MEDICINE

## 2024-07-29 PROCEDURE — 1159F MED LIST DOCD IN RCRD: CPT | Performed by: FAMILY MEDICINE

## 2024-07-29 PROCEDURE — 1125F AMNT PAIN NOTED PAIN PRSNT: CPT | Performed by: FAMILY MEDICINE

## 2024-07-29 PROCEDURE — 99213 OFFICE O/P EST LOW 20 MIN: CPT | Performed by: FAMILY MEDICINE

## 2024-07-29 RX ORDER — CONJUGATED ESTROGENS 0.62 MG/G
1 CREAM VAGINAL
COMMUNITY

## 2024-07-29 RX ORDER — METHYLPREDNISOLONE 4 MG/1
TABLET ORAL
Qty: 21 TABLET | Refills: 0 | Status: SHIPPED | OUTPATIENT
Start: 2024-07-29

## 2024-07-29 RX ORDER — TRIAMCINOLONE ACETONIDE 55 UG/1
2 SPRAY, METERED NASAL
COMMUNITY

## 2024-07-31 DIAGNOSIS — Z13.6 ENCOUNTER FOR SCREENING FOR VASCULAR DISEASE: Primary | ICD-10-CM

## 2024-08-08 ENCOUNTER — TRANSCRIBE ORDERS (OUTPATIENT)
Dept: GENERAL RADIOLOGY | Facility: HOSPITAL | Age: 74
End: 2024-08-08
Payer: MEDICARE

## 2024-08-08 ENCOUNTER — HOSPITAL ENCOUNTER (OUTPATIENT)
Dept: MRI IMAGING | Facility: HOSPITAL | Age: 74
Discharge: HOME | End: 2024-08-08
Payer: MEDICARE

## 2024-08-08 ENCOUNTER — HOSPITAL ENCOUNTER (OUTPATIENT)
Dept: GENERAL RADIOLOGY | Facility: HOSPITAL | Age: 74
Discharge: HOME | End: 2024-08-08
Payer: MEDICARE

## 2024-08-08 DIAGNOSIS — M19.90 OSTEOARTHRITIS, UNSPECIFIED OSTEOARTHRITIS TYPE, UNSPECIFIED SITE: Primary | ICD-10-CM

## 2024-08-08 DIAGNOSIS — M19.90 OSTEOARTHRITIS, UNSPECIFIED OSTEOARTHRITIS TYPE, UNSPECIFIED SITE: ICD-10-CM

## 2024-08-08 DIAGNOSIS — M54.16 LUMBAR RADICULOPATHY: ICD-10-CM

## 2024-08-08 PROCEDURE — 72148 MRI LUMBAR SPINE W/O DYE: CPT

## 2024-08-08 PROCEDURE — 73521 X-RAY EXAM HIPS BI 2 VIEWS: CPT

## 2024-09-03 DIAGNOSIS — G25.0 FAMILIAL TREMOR: ICD-10-CM

## 2024-09-03 DIAGNOSIS — E78.00 PURE HYPERCHOLESTEROLEMIA: ICD-10-CM

## 2024-09-03 DIAGNOSIS — I72.8 SPLENIC ARTERY ANEURYSM: ICD-10-CM

## 2024-09-03 DIAGNOSIS — R74.8 ELEVATED LIVER ENZYMES: ICD-10-CM

## 2024-09-03 DIAGNOSIS — E55.9 VITAMIN D DEFICIENCY: Primary | ICD-10-CM

## 2024-09-10 LAB
25(OH)D3+25(OH)D2 SERPL-MCNC: 59.4 NG/ML (ref 30–100)
ALBUMIN SERPL-MCNC: 4.4 G/DL (ref 3.5–5.2)
ALBUMIN/GLOB SERPL: 1.8 G/DL
ALP SERPL-CCNC: 40 U/L (ref 39–117)
ALT SERPL-CCNC: 32 U/L (ref 1–33)
AST SERPL-CCNC: 53 U/L (ref 1–32)
BASOPHILS # BLD AUTO: 0.06 10*3/MM3 (ref 0–0.2)
BASOPHILS NFR BLD AUTO: 1.1 % (ref 0–1.5)
BILIRUB SERPL-MCNC: 0.5 MG/DL (ref 0–1.2)
BUN SERPL-MCNC: 16 MG/DL (ref 8–23)
BUN/CREAT SERPL: 25.4 (ref 7–25)
CALCIUM SERPL-MCNC: 9.4 MG/DL (ref 8.6–10.5)
CHLORIDE SERPL-SCNC: 97 MMOL/L (ref 98–107)
CHOLEST SERPL-MCNC: 243 MG/DL (ref 0–200)
CO2 SERPL-SCNC: 29.9 MMOL/L (ref 22–29)
CREAT SERPL-MCNC: 0.63 MG/DL (ref 0.57–1)
EGFRCR SERPLBLD CKD-EPI 2021: 95 ML/MIN/1.73
EOSINOPHIL # BLD AUTO: 0.14 10*3/MM3 (ref 0–0.4)
EOSINOPHIL NFR BLD AUTO: 2.6 % (ref 0.3–6.2)
ERYTHROCYTE [DISTWIDTH] IN BLOOD BY AUTOMATED COUNT: 12.5 % (ref 12.3–15.4)
GLOBULIN SER CALC-MCNC: 2.4 GM/DL
GLUCOSE SERPL-MCNC: 103 MG/DL (ref 65–99)
HCT VFR BLD AUTO: 42.9 % (ref 34–46.6)
HDLC SERPL-MCNC: 108 MG/DL (ref 40–60)
HGB BLD-MCNC: 14.2 G/DL (ref 12–15.9)
IMM GRANULOCYTES # BLD AUTO: 0.01 10*3/MM3 (ref 0–0.05)
IMM GRANULOCYTES NFR BLD AUTO: 0.2 % (ref 0–0.5)
LDLC SERPL CALC-MCNC: 127 MG/DL (ref 0–100)
LYMPHOCYTES # BLD AUTO: 1.57 10*3/MM3 (ref 0.7–3.1)
LYMPHOCYTES NFR BLD AUTO: 28.9 % (ref 19.6–45.3)
MCH RBC QN AUTO: 31.3 PG (ref 26.6–33)
MCHC RBC AUTO-ENTMCNC: 33.1 G/DL (ref 31.5–35.7)
MCV RBC AUTO: 94.7 FL (ref 79–97)
MONOCYTES # BLD AUTO: 0.72 10*3/MM3 (ref 0.1–0.9)
MONOCYTES NFR BLD AUTO: 13.2 % (ref 5–12)
NEUTROPHILS # BLD AUTO: 2.94 10*3/MM3 (ref 1.7–7)
NEUTROPHILS NFR BLD AUTO: 54 % (ref 42.7–76)
NRBC BLD AUTO-RTO: 0 /100 WBC (ref 0–0.2)
PLATELET # BLD AUTO: 330 10*3/MM3 (ref 140–450)
POTASSIUM SERPL-SCNC: 4.8 MMOL/L (ref 3.5–5.2)
PROT SERPL-MCNC: 6.8 G/DL (ref 6–8.5)
RBC # BLD AUTO: 4.53 10*6/MM3 (ref 3.77–5.28)
SODIUM SERPL-SCNC: 138 MMOL/L (ref 136–145)
TRIGL SERPL-MCNC: 49 MG/DL (ref 0–150)
TSH SERPL DL<=0.005 MIU/L-ACNC: 2.6 UIU/ML (ref 0.27–4.2)
VLDLC SERPL CALC-MCNC: 8 MG/DL (ref 5–40)
WBC # BLD AUTO: 5.44 10*3/MM3 (ref 3.4–10.8)

## 2024-09-19 ENCOUNTER — OFFICE VISIT (OUTPATIENT)
Dept: FAMILY MEDICINE CLINIC | Facility: CLINIC | Age: 74
End: 2024-09-19
Payer: MEDICARE

## 2024-09-19 VITALS
HEIGHT: 68 IN | WEIGHT: 158.2 LBS | TEMPERATURE: 97.6 F | BODY MASS INDEX: 23.98 KG/M2 | HEART RATE: 76 BPM | SYSTOLIC BLOOD PRESSURE: 108 MMHG | DIASTOLIC BLOOD PRESSURE: 70 MMHG | OXYGEN SATURATION: 94 %

## 2024-09-19 DIAGNOSIS — Z00.00 ENCOUNTER FOR SUBSEQUENT ANNUAL WELLNESS VISIT (AWV) IN MEDICARE PATIENT: Primary | ICD-10-CM

## 2024-09-19 DIAGNOSIS — R41.3 MEMORY DIFFICULTY: ICD-10-CM

## 2024-09-19 DIAGNOSIS — Z81.8 FAMILY HISTORY OF DEMENTIA: ICD-10-CM

## 2024-09-19 PROCEDURE — 1170F FXNL STATUS ASSESSED: CPT | Performed by: FAMILY MEDICINE

## 2024-09-19 PROCEDURE — 1159F MED LIST DOCD IN RCRD: CPT | Performed by: FAMILY MEDICINE

## 2024-09-19 PROCEDURE — G0439 PPPS, SUBSEQ VISIT: HCPCS | Performed by: FAMILY MEDICINE

## 2024-09-19 PROCEDURE — 1126F AMNT PAIN NOTED NONE PRSNT: CPT | Performed by: FAMILY MEDICINE

## 2024-09-19 PROCEDURE — 1160F RVW MEDS BY RX/DR IN RCRD: CPT | Performed by: FAMILY MEDICINE

## 2024-09-30 ENCOUNTER — TELEPHONE (OUTPATIENT)
Dept: FAMILY MEDICINE CLINIC | Facility: CLINIC | Age: 74
End: 2024-09-30
Payer: MEDICARE

## 2024-09-30 NOTE — TELEPHONE ENCOUNTER
Hub staff attempted to follow warm transfer process and was unsuccessful     Caller:     Relationship to patient:     Best call back number: [phone]     Patient is needing: Behavorial Health/Psych REFERRAL STATUS UPDATE. PATIENT HAS NOT HEARD FROM REFERRAL. PLEASE CALL TO ADVISE

## 2024-09-30 NOTE — TELEPHONE ENCOUNTER
Spoke with patient to inform her that the referral process can take 7-14 days for processing once approved someone from the office that her provider referred her to will contact her for scheduling. Patient voiced understanding and had no further questions for our office.

## 2024-12-17 DIAGNOSIS — R41.3 MEMORY DIFFICULTY: Primary | ICD-10-CM

## 2024-12-17 DIAGNOSIS — Z81.8 FAMILY HISTORY OF DEMENTIA: ICD-10-CM

## 2025-01-13 ENCOUNTER — TELEPHONE (OUTPATIENT)
Dept: FAMILY MEDICINE CLINIC | Facility: CLINIC | Age: 75
End: 2025-01-13

## 2025-01-13 NOTE — TELEPHONE ENCOUNTER
"  Caller: Melany Lloyd \"EDY\"    Relationship: Self    Best call back number: [phone]     What is the best time to reach you: ANY    Who are you requesting to speak with (clinical staff, provider,  specific staff member): CLINICAL    Do you know the name of the person who called: EDY    What was the call regarding: PATIENT IS REQUESTING THE DATE OF HER LAST DEXA SCAN. PLEASE CALL AND ADVISE.    Is it okay if the provider responds through MyChart:     "

## 2025-01-21 ENCOUNTER — HOSPITAL ENCOUNTER (OUTPATIENT)
Dept: MRI IMAGING | Facility: HOSPITAL | Age: 75
Discharge: HOME | End: 2025-01-21
Admitting: FAMILY MEDICINE
Payer: MEDICARE

## 2025-01-21 DIAGNOSIS — Z81.8 FAMILY HISTORY OF DEMENTIA: ICD-10-CM

## 2025-01-21 DIAGNOSIS — R41.3 MEMORY DIFFICULTY: ICD-10-CM

## 2025-01-21 PROCEDURE — 70551 MRI BRAIN STEM W/O DYE: CPT

## 2025-01-29 ENCOUNTER — TELEPHONE (OUTPATIENT)
Dept: ORTHOPEDIC SURGERY | Facility: CLINIC | Age: 75
End: 2025-01-29

## 2025-01-29 NOTE — TELEPHONE ENCOUNTER
Caller: LORE    Relationship: SELF    Best call back number: [phone]    What is the best time to reach you: ANY    Who are you requesting to speak with (clinical staff, provider,  specific staff member): CLINICAL    What was the call regarding: WOULD LIKE TO SEE IF SHE COULD GET AN ORDER FOR A HEEL WEDGE KRISISHMAEL SANON GAVE HER IN 2022- WOULD LIKE TO  ON FRIDAY IF POSSIBLE- PLEASE CALL

## 2025-01-29 NOTE — TELEPHONE ENCOUNTER
Called and spoke to patient. I advised her she is able to  at the office, patient does not need an order for this.cld

## 2025-01-31 ENCOUNTER — TELEPHONE (OUTPATIENT)
Dept: ORTHOPEDIC SURGERY | Facility: CLINIC | Age: 75
End: 2025-01-31
Payer: MEDICARE

## 2025-01-31 NOTE — TELEPHONE ENCOUNTER
Sent message to patient letting her know that the wedges are over the counter items and she can get a new one from [facility]

## 2025-01-31 NOTE — TELEPHONE ENCOUNTER
PATIENT CAME IN OFFICE WANTING TO GET A NEW L MEDIAL HEEL WEDGE- SHE LAST SAW CAR 2.15.2022 IN WHICH WEDGE ORDER WAS PLACED -    I TOLD HER SINCE IT HAS BEEN A COUPLE YEARS A NEW ORDER MAY NEED TO BE PLACED AND OR MAY NEED TO BE SEEN AGAIN PRIOR TO GETTING A NEW ONE.    PLEASE ADVISE ON THIS

## 2025-02-19 ENCOUNTER — TRANSCRIBE ORDERS (OUTPATIENT)
Dept: ADMINISTRATIVE | Facility: HOSPITAL | Age: 75
End: 2025-02-19
Payer: MEDICARE

## 2025-02-19 DIAGNOSIS — Z12.31 SCREENING MAMMOGRAM FOR BREAST CANCER: Primary | ICD-10-CM

## 2025-02-24 ENCOUNTER — HOSPITAL ENCOUNTER (OUTPATIENT)
Dept: CARDIOLOGY | Facility: HOSPITAL | Age: 75
Discharge: HOME | End: 2025-02-24
Admitting: FAMILY MEDICINE

## 2025-02-24 DIAGNOSIS — Z13.6 ENCOUNTER FOR SCREENING FOR VASCULAR DISEASE: ICD-10-CM

## 2025-02-24 LAB
BH CV VAS SCREENING CAROTID CCA LEFT: 106 CM/SEC
BH CV VAS SCREENING CAROTID CCA RIGHT: 123 CM/SEC
BH CV VAS SCREENING CAROTID ICA LEFT: 120 CM/SEC
BH CV VAS SCREENING CAROTID ICA RIGHT: 107 CM/SEC
BH CV XLRA MEAS - MID AO DIAM: 1.4 CM
BH CV XLRA MEAS - PAD LEFT ABI PT: 1.23
BH CV XLRA MEAS - PAD LEFT ARM: 138 MMHG
BH CV XLRA MEAS - PAD LEFT LEG PT: 170 MMHG
BH CV XLRA MEAS - PAD RIGHT ABI PT: 1.28
BH CV XLRA MEAS - PAD RIGHT ARM: 134 MMHG
BH CV XLRA MEAS - PAD RIGHT LEG PT: 176 MMHG
BH CV XLRA MEAS LEFT DIST CCA EDV: -30.6 CM/SEC
BH CV XLRA MEAS LEFT DIST CCA PSV: -106 CM/SEC
BH CV XLRA MEAS LEFT ICA/CCA RATIO: 1.1
BH CV XLRA MEAS LEFT PROX ICA EDV: -38.4 CM/SEC
BH CV XLRA MEAS LEFT PROX ICA PSV: -120 CM/SEC
BH CV XLRA MEAS RIGHT DIST CCA EDV: -32.9 CM/SEC
BH CV XLRA MEAS RIGHT DIST CCA PSV: -123 CM/SEC
BH CV XLRA MEAS RIGHT ICA/CCA RATIO: 0.9
BH CV XLRA MEAS RIGHT PROX ICA EDV: -18.8 CM/SEC
BH CV XLRA MEAS RIGHT PROX ICA PSV: -107 CM/SEC

## 2025-02-24 PROCEDURE — 93799 UNLISTED CV SVC/PROCEDURE: CPT

## 2025-04-07 ENCOUNTER — OFFICE VISIT (OUTPATIENT)
Dept: ORTHOPEDIC SURGERY | Facility: CLINIC | Age: 75
End: 2025-04-07
Payer: MEDICARE

## 2025-04-07 VITALS — BODY MASS INDEX: 24.19 KG/M2 | HEIGHT: 68 IN | WEIGHT: 159.6 LBS | TEMPERATURE: 97.6 F

## 2025-04-07 DIAGNOSIS — R52 PAIN: Primary | ICD-10-CM

## 2025-04-07 DIAGNOSIS — M17.12 PRIMARY OSTEOARTHRITIS OF LEFT KNEE: ICD-10-CM

## 2025-04-07 PROCEDURE — 73562 X-RAY EXAM OF KNEE 3: CPT | Performed by: NURSE PRACTITIONER

## 2025-04-07 PROCEDURE — 1160F RVW MEDS BY RX/DR IN RCRD: CPT | Performed by: NURSE PRACTITIONER

## 2025-04-07 PROCEDURE — 1159F MED LIST DOCD IN RCRD: CPT | Performed by: NURSE PRACTITIONER

## 2025-04-07 PROCEDURE — 20610 DRAIN/INJ JOINT/BURSA W/O US: CPT | Performed by: NURSE PRACTITIONER

## 2025-04-07 PROCEDURE — 99214 OFFICE O/P EST MOD 30 MIN: CPT | Performed by: NURSE PRACTITIONER

## 2025-04-07 RX ORDER — CHOLECALCIFEROL (VITD3)/VIT K2 1250-200
CAPSULE ORAL
COMMUNITY

## 2025-04-07 RX ORDER — METHYLPREDNISOLONE ACETATE 80 MG/ML
80 INJECTION, SUSPENSION INTRA-ARTICULAR; INTRALESIONAL; INTRAMUSCULAR; SOFT TISSUE
Status: COMPLETED | OUTPATIENT
Start: 2025-04-07 | End: 2025-04-07

## 2025-04-07 RX ORDER — CLINDAMYCIN HYDROCHLORIDE 150 MG/1
CAPSULE ORAL
COMMUNITY
Start: 2025-04-02

## 2025-04-07 RX ORDER — CYCLOSPORINE 0.5 MG/ML
1 EMULSION OPHTHALMIC EVERY 12 HOURS
COMMUNITY

## 2025-04-07 RX ORDER — NAPROXEN SODIUM 220 MG/1
TABLET, FILM COATED ORAL EVERY 12 HOURS
COMMUNITY

## 2025-04-07 RX ADMIN — METHYLPREDNISOLONE ACETATE 80 MG: 80 INJECTION, SUSPENSION INTRA-ARTICULAR; INTRALESIONAL; INTRAMUSCULAR; SOFT TISSUE at 13:39

## 2025-04-07 NOTE — PROGRESS NOTES
"Patient: Melany Lloyd  YOB: 1950 74 y.o. female  Medical Record Number: [id number]    Chief Complaints:   Chief Complaint   Patient presents with   • Left Knee - Initial Evaluation       History of Present Illness:Melany Lloyd is a 74 y.o. female who presents with complaints of worsening in left knee pain, she has known history of osteoarthritis reports over the last year or so she has had increased pain with walking and feelings as though the knee is getting stuck.    Allergies:   Allergies   Allergen Reactions   • Diphenhydramine Hcl Hives   • Diphenhydramine Rash   • Diphenhydramine Hcl Other (See Comments)     \"it wakes me up-I can't sleep.\"   • Penicillins Itching and Rash   • Red Dye #40 (Allura Red) Itching and Rash       Medications:   Current Outpatient Medications   Medication Sig Dispense Refill   • B Complex Vitamins (vitamin b complex) capsule capsule Take 1 capsule by mouth Daily. PT HOLDING FOR SURGERY     • Cholecalciferol (VITAMIN D) 2000 UNITS capsule Take 5,000 Units by mouth Daily. HOLDING FOR SURGERY     • clindamycin (CLEOCIN) 150 MG capsule      • cycloSPORINE (Restasis) 0.05 % ophthalmic emulsion Apply 1 drop to eye(s) as directed by provider Every 12 (Twelve) Hours.     • Diclofenac Sodium (VOLTAREN) 1 % gel gel Apply 4 g topically to the appropriate area as directed 4 (Four) Times a Day As Needed.     • estradiol (ESTRACE) 0.1 MG/GM vaginal cream Insert 2 g into the vagina Every Other Day.     • Flaxseed, Linseed, (FLAX SEED OIL PO) Take 1 capsule by mouth Daily. HOLDING FOR SURGERY     • ketoconazole (NIZORAL) 2 % shampoo      • Loperamide HCl (IMODIUM A-D PO) Take  by mouth.     • MILK THISTLE PO Take 1 capsule by mouth 2 (Two) Times a Day. HOLDING FOR SURGERY     • naproxen sodium (ALEVE) 220 MG tablet Take  by mouth Every 12 (Twelve) Hours.     • Omega-3 Fatty Acids (FISH OIL PO) Take 1 tablet by mouth 2 (Two) Times a Day. HOLDING FOR SURGERY     • Probiotic Product " "(PROBIOTIC ADVANCED PO) Take 1 capsule by mouth 2 (Two) Times a Day.     • Restasis 0.05 % ophthalmic emulsion Administer 1 drop to both eyes Every 12 (Twelve) Hours.     • Triamcinolone Acetonide (NASACORT) 55 MCG/ACT nasal inhaler Administer 2 sprays into the nostril(s) as directed by provider.     • Vitamin D-Vitamin K (Decara K) 1250-200 MCG capsule        No current facility-administered medications for this visit.         The following portions of the patient's history were reviewed and updated as appropriate: allergies, current medications, past family history, past medical history, past social history, past surgical history and problem list.    Review of Systems:   14 point review of systems was performed. All systems negative except pertinent positives/negatives listed in HPI above    Physical Exam:   Vitals:    04/07/25 1323   Temp: 97.6 °F (36.4 °C)   Weight: 72.4 kg (159 lb 9.6 oz)   Height: 172.7 cm (68\")       General: A and O x 3, ASA, NAD    Skin clear no unusual lesions noted  Left knee patient has no appreciable effusion noted 122 degrees flexion neutral in extension positive Shira negative Lockman calf soft and nontender      Radiology:  Xrays 3views (ap,lateral, sunrise) left knee were ordered and reviewed today secondary to increased pain show areas of bone-on-bone end-stage osteoarthritis with cyst and spur formation particularly involving the lateral compartment best appreciated on lateral view.  No compared to views available    Assessment/Plan: End-stage osteoarthritis left knee with increasing pain    Patient and I discussed options including conservative measures versus total knee replacement, she would like to start with a left knee cortisone injection, medial heel wedge, she will resume physical therapy exercises and I will see her back for follow-up in 3 to 4 months if needed    Large Joint Arthrocentesis: L knee  Date/Time: 4/7/2025 1:39 PM  Consent given by: patient  Site marked: " site marked  Timeout: Immediately prior to procedure a time out was called to verify the correct patient, procedure, equipment, support staff and site/side marked as required   Supporting Documentation  Indications: pain and joint swelling   Procedure Details  Location: knee - L knee  Preparation: Patient was prepped and draped in the usual sterile fashion  Needle size: 22 G  Approach: anterolateral  Medications administered: 80 mg methylPREDNISolone acetate 80 MG/ML; 2 mL lidocaine (cardiac)  Patient tolerance: patient tolerated the procedure well with no immediate complications           Lillian Portillo, APRN  4/7/2025

## 2025-04-09 ENCOUNTER — HOSPITAL ENCOUNTER (OUTPATIENT)
Dept: MAMMOGRAPHY | Facility: HOSPITAL | Age: 75
Discharge: HOME | End: 2025-04-09
Admitting: FAMILY MEDICINE
Payer: MEDICARE

## 2025-04-09 ENCOUNTER — RESULTS FOLLOW-UP (OUTPATIENT)
Dept: ADMINISTRATIVE | Facility: HOSPITAL | Age: 75
End: 2025-04-09
Payer: MEDICARE

## 2025-04-09 DIAGNOSIS — Z12.31 SCREENING MAMMOGRAM FOR BREAST CANCER: ICD-10-CM

## 2025-04-09 PROCEDURE — 77063 BREAST TOMOSYNTHESIS BI: CPT

## 2025-04-09 PROCEDURE — 77067 SCR MAMMO BI INCL CAD: CPT

## 2025-04-09 NOTE — TELEPHONE ENCOUNTER
Results mailed to pt   The mammogram is WNL. As long as she is UTD with her GYN with breast exams, this is good for one year.

## 2025-04-09 NOTE — LETTER
Melany Lloyd  [address] [place] [address]    April 9, 2025     Dear Ms. Lloyd:    Below are the results from your recent visit:The mammogram is WNL. As long as she is UTD with her GYN with breast exams, this is good for one year.     Resulted Orders   Mammo Screening Digital Tomosynthesis Bilateral With CAD    Narrative    MAMMO SCREENING DIGITAL TOMOSYNTHESIS BILATERAL W CAD-     CLINICAL INDICATION: 74 years old female presents for annual screening  mammogram.     COMPARISON: Prior examinations dating back to 11/9/2020.     TECHNIQUE: 2-D and tomosynthesis MLO and CC views of the breast(s) were  obtained     FINDINGS:  There are scattered areas of fibroglandular density.     Benign-appearing postsurgical changes in the left breast. There are no  suspicious masses, calcifications, or areas of architectural distortion.        IMPRESSION/RECOMMENDATION(S):  No mammographic evidence of malignancy. Recommend annual screening  mammogram in one year.        Note: In patients with heterogeneously dense or extremely dense tissue,  supplemental screening breast MRI or whole breast ultrasound should be  considered. Please refer to the findings section above to identify the  breast density category for this patient, which could be almost entirely  fatty, scattered, heterogeneously dense, or extremely dense.     BI-RADS Category 2: Benign            This report was finalized on 4/9/2025 1:34 PM by Dr. Jaja Villasenor M.D  on Workstation: [id number]          If you have any questions or concerns, please don't hesitate to call.         Sincerely,        Jose Alberto Kovacs MD

## 2025-05-19 ENCOUNTER — TELEPHONE (OUTPATIENT)
Dept: ORTHOPEDIC SURGERY | Facility: CLINIC | Age: 75
End: 2025-05-19

## 2025-05-19 NOTE — TELEPHONE ENCOUNTER
"Caller: Melany Lloyd \"EDY\"    Relationship to patient: Self    Best call back number: [phone]    Chief complaint: LEFT KNEE PAIN    Type of visit: KNEE SX    Requested date: ASAP      Additional notes:PT IS WANTING TO START THE PROCESS FOR A LEFT KNEE REPLACEMENT.. PLEASE ADVISE..           "

## 2025-07-24 ENCOUNTER — OFFICE VISIT (OUTPATIENT)
Dept: ORTHOPEDIC SURGERY | Facility: CLINIC | Age: 75
End: 2025-07-24
Payer: MEDICARE

## 2025-07-24 ENCOUNTER — PREP FOR SURGERY (OUTPATIENT)
Dept: OTHER | Facility: HOSPITAL | Age: 75
End: 2025-07-24
Payer: MEDICARE

## 2025-07-24 VITALS — HEIGHT: 68 IN | WEIGHT: 153.2 LBS | BODY MASS INDEX: 23.22 KG/M2 | TEMPERATURE: 98.2 F

## 2025-07-24 DIAGNOSIS — M17.12 PRIMARY OSTEOARTHRITIS OF LEFT KNEE: Primary | ICD-10-CM

## 2025-07-24 PROCEDURE — 1160F RVW MEDS BY RX/DR IN RCRD: CPT | Performed by: NURSE PRACTITIONER

## 2025-07-24 PROCEDURE — 1159F MED LIST DOCD IN RCRD: CPT | Performed by: NURSE PRACTITIONER

## 2025-07-24 PROCEDURE — 99214 OFFICE O/P EST MOD 30 MIN: CPT | Performed by: NURSE PRACTITIONER

## 2025-07-24 RX ORDER — TRAMADOL HYDROCHLORIDE 50 MG/1
TABLET ORAL
COMMUNITY
Start: 2025-07-23

## 2025-07-24 RX ORDER — CHLORHEXIDINE GLUCONATE 500 MG/1
CLOTH TOPICAL 2 TIMES DAILY
OUTPATIENT
Start: 2025-07-24

## 2025-07-24 NOTE — PROGRESS NOTES
"Patient: Melany Lloyd  YOB: 1950 74 y.o. female  Medical Record Number: [id number]    Chief Complaints:   Chief Complaint   Patient presents with    Left Knee - Follow-up, Pain       History of Present Illness:Melany Lloyd is a 74 y.o. female who presents with complaints of worsening in left knee pain, she has tried and failed conservative measures including injections, physical therapy, medications, she would like to proceed with surgery    Allergies:   Allergies   Allergen Reactions    Diphenhydramine Hcl Hives    Diphenhydramine Rash    Diphenhydramine Hcl Other (See Comments)     \"it wakes me up-I can't sleep.\"    Penicillins Itching and Rash    Red Dye #40 (Allura Red) Itching and Rash       Medications:   Current Outpatient Medications   Medication Sig Dispense Refill    B Complex Vitamins (vitamin b complex) capsule capsule Take 1 capsule by mouth Daily. PT HOLDING FOR SURGERY      Cholecalciferol (VITAMIN D) 2000 UNITS capsule Take 5,000 Units by mouth Daily. HOLDING FOR SURGERY      clindamycin (CLEOCIN) 150 MG capsule       cycloSPORINE (Restasis) 0.05 % ophthalmic emulsion Apply 1 drop to eye(s) as directed by provider Every 12 (Twelve) Hours.      Diclofenac Sodium (VOLTAREN) 1 % gel gel Apply 4 g topically to the appropriate area as directed 4 (Four) Times a Day As Needed.      estradiol (ESTRACE) 0.1 MG/GM vaginal cream Insert 2 g into the vagina Every Other Day.      Flaxseed, Linseed, (FLAX SEED OIL PO) Take 1 capsule by mouth Daily. HOLDING FOR SURGERY      ketoconazole (NIZORAL) 2 % shampoo       Loperamide HCl (IMODIUM A-D PO) Take  by mouth.      MILK THISTLE PO Take 1 capsule by mouth 2 (Two) Times a Day. HOLDING FOR SURGERY      naproxen sodium (ALEVE) 220 MG tablet Take  by mouth Every 12 (Twelve) Hours.      Omega-3 Fatty Acids (FISH OIL PO) Take 1 tablet by mouth 2 (Two) Times a Day. HOLDING FOR SURGERY      Probiotic Product (PROBIOTIC ADVANCED PO) Take 1 capsule by " "mouth 2 (Two) Times a Day.      Restasis 0.05 % ophthalmic emulsion Administer 1 drop to both eyes Every 12 (Twelve) Hours.      traMADol (ULTRAM) 50 MG tablet       Triamcinolone Acetonide (NASACORT) 55 MCG/ACT nasal inhaler Administer 2 sprays into the nostril(s) as directed by provider.      Vitamin D-Vitamin K (Decara K) 1250-200 MCG capsule        No current facility-administered medications for this visit.         The following portions of the patient's history were reviewed and updated as appropriate: allergies, current medications, past family history, past medical history, past social history, past surgical history and problem list.    Review of Systems:   14 point review of systems was performed. All systems negative except pertinent positives/negatives listed in HPI above    Physical Exam:   Vitals:    07/24/25 1035   Temp: 98.2 °F (36.8 °C)   TempSrc: Temporal   Weight: 69.5 kg (153 lb 3.2 oz)   Height: 172.7 cm (68\")   PainSc: 1   Comment: 10/10 at worse   PainLoc: Knee       General: A and O x 3, ASA, NAD    Skin clear no unusual lesions noted  Left knee patient has trace amount of effusion noted with 116 degrees flexion neutral in extension positive Shira negative Lachman      Radiology:  Xrays 3views (ap,lateral, sunrise) previous x-rays left knee were reviewed show bone-on-bone end-stage osteoarthritis with cyst and spur formation    Assessment/Plan: End-stage osteoarthritis left knee with increased pain    Patient I discussed options including conservative measures versus total knee replacement, she would like to proceed with left total knee replacement overnight stay, risk benefits alternatives, discussed, she would like to proceed      Lillian Portillo, APRN  7/24/2025  "

## 2025-08-18 DIAGNOSIS — E55.9 VITAMIN D DEFICIENCY: ICD-10-CM

## 2025-08-18 DIAGNOSIS — R53.83 OTHER FATIGUE: ICD-10-CM

## 2025-08-18 DIAGNOSIS — E78.00 PURE HYPERCHOLESTEROLEMIA: Primary | ICD-10-CM

## 2025-08-20 RX ORDER — ESTRADIOL 0.1 MG/G
2 CREAM VAGINAL EVERY OTHER DAY
Qty: 42.5 G | Refills: 3 | Status: SHIPPED | OUTPATIENT
Start: 2025-08-20

## 2025-08-29 LAB
25(OH)D3+25(OH)D2 SERPL-MCNC: 62.2 NG/ML (ref 30–100)
ALBUMIN SERPL-MCNC: 4.5 G/DL (ref 3.5–5.2)
ALBUMIN/GLOB SERPL: 1.8 G/DL
ALP SERPL-CCNC: 37 U/L (ref 39–117)
ALT SERPL-CCNC: 20 U/L (ref 1–33)
AST SERPL-CCNC: 39 U/L (ref 1–32)
BASOPHILS # BLD AUTO: 0.02 10*3/MM3 (ref 0–0.2)
BASOPHILS NFR BLD AUTO: 0.3 % (ref 0–1.5)
BILIRUB SERPL-MCNC: 0.5 MG/DL (ref 0–1.2)
BUN SERPL-MCNC: 16 MG/DL (ref 8–23)
BUN/CREAT SERPL: 25.4 (ref 7–25)
CALCIUM SERPL-MCNC: 10 MG/DL (ref 8.6–10.5)
CHLORIDE SERPL-SCNC: 100 MMOL/L (ref 98–107)
CHOLEST SERPL-MCNC: 244 MG/DL (ref 0–200)
CO2 SERPL-SCNC: 28.6 MMOL/L (ref 22–29)
CREAT SERPL-MCNC: 0.63 MG/DL (ref 0.57–1)
EGFRCR SERPLBLD CKD-EPI 2021: 94.4 ML/MIN/1.73
EOSINOPHIL # BLD AUTO: 0.04 10*3/MM3 (ref 0–0.4)
EOSINOPHIL NFR BLD AUTO: 0.6 % (ref 0.3–6.2)
ERYTHROCYTE [DISTWIDTH] IN BLOOD BY AUTOMATED COUNT: 12.6 % (ref 12.3–15.4)
GLOBULIN SER CALC-MCNC: 2.5 GM/DL
GLUCOSE SERPL-MCNC: 109 MG/DL (ref 65–99)
HCT VFR BLD AUTO: 43.4 % (ref 34–46.6)
HDLC SERPL-MCNC: 89 MG/DL (ref 40–60)
HGB BLD-MCNC: 13.9 G/DL (ref 12–15.9)
IMM GRANULOCYTES # BLD AUTO: 0.02 10*3/MM3 (ref 0–0.05)
IMM GRANULOCYTES NFR BLD AUTO: 0.3 % (ref 0–0.5)
LDLC SERPL CALC-MCNC: 145 MG/DL (ref 0–100)
LYMPHOCYTES # BLD AUTO: 1.34 10*3/MM3 (ref 0.7–3.1)
LYMPHOCYTES NFR BLD AUTO: 20.2 % (ref 19.6–45.3)
MCH RBC QN AUTO: 30.7 PG (ref 26.6–33)
MCHC RBC AUTO-ENTMCNC: 32 G/DL (ref 31.5–35.7)
MCV RBC AUTO: 95.8 FL (ref 79–97)
MONOCYTES # BLD AUTO: 0.64 10*3/MM3 (ref 0.1–0.9)
MONOCYTES NFR BLD AUTO: 9.7 % (ref 5–12)
NEUTROPHILS # BLD AUTO: 4.57 10*3/MM3 (ref 1.7–7)
NEUTROPHILS NFR BLD AUTO: 68.9 % (ref 42.7–76)
NRBC BLD AUTO-RTO: 0 /100 WBC (ref 0–0.2)
PLATELET # BLD AUTO: 394 10*3/MM3 (ref 140–450)
POTASSIUM SERPL-SCNC: 5.4 MMOL/L (ref 3.5–5.2)
PROT SERPL-MCNC: 7 G/DL (ref 6–8.5)
RBC # BLD AUTO: 4.53 10*6/MM3 (ref 3.77–5.28)
SODIUM SERPL-SCNC: 137 MMOL/L (ref 136–145)
TRIGL SERPL-MCNC: 61 MG/DL (ref 0–150)
TSH SERPL DL<=0.005 MIU/L-ACNC: 3.63 UIU/ML (ref 0.27–4.2)
VLDLC SERPL CALC-MCNC: 10 MG/DL (ref 5–40)
WBC # BLD AUTO: 6.63 10*3/MM3 (ref 3.4–10.8)

## (undated) DEVICE — APPL CHLORAPREP HI/LITE 26ML ORNG

## (undated) DEVICE — SENSR O2 OXIMAX FNGR A/ 18IN NONSTR

## (undated) DEVICE — KT ORCA ORCAPOD DISP STRL

## (undated) DEVICE — ENDOPATH XCEL UNIVERSAL TROCAR STABLILITY SLEEVES: Brand: ENDOPATH XCEL

## (undated) DEVICE — TOWEL,OR,DSP,ST,BLUE,STD,4/PK,20PK/CS: Brand: MEDLINE

## (undated) DEVICE — CATHETER,FOLEY,100%SILICONE,16FR,10ML,LF: Brand: MEDLINE

## (undated) DEVICE — IRRIGATOR BULB ASEPTO 60CC STRL

## (undated) DEVICE — ST IRR CYSTO W/SPK 77IN LF

## (undated) DEVICE — DEV SUT GRSPR CLOSUR 15CM 14G

## (undated) DEVICE — SUT VIC 0 CT1 36IN J946H

## (undated) DEVICE — DRAPE,SPLIT,CV,CLR ANES,STERILE: Brand: MEDLINE

## (undated) DEVICE — SPNG LAP 18X18IN LF STRL PK/5

## (undated) DEVICE — LOU LAVH: Brand: MEDLINE INDUSTRIES, INC.

## (undated) DEVICE — 3M™ STERI-STRIP™ REINFORCED ADHESIVE SKIN CLOSURES, R1547, 1/2 IN X 4 IN (12 MM X 100 MM), 6 STRIPS/ENVELOPE: Brand: 3M™ STERI-STRIP™

## (undated) DEVICE — SUT MNCRYL PLS ANTIB UD 4/0 PS2 18IN

## (undated) DEVICE — COVER,TABLE,HEAVY DUTY,79"X110",STRL: Brand: MEDLINE

## (undated) DEVICE — SOL NACL 0.9PCT 1000ML

## (undated) DEVICE — 3M™ STERI-DRAPE™ INSTRUMENT POUCH 1018: Brand: STERI-DRAPE™

## (undated) DEVICE — 3M™ STERI-STRIP™ COMPOUND BENZOIN TINCTURE 40 BAGS/CARTON 4 CARTONS/CASE C1544: Brand: 3M™ STERI-STRIP™

## (undated) DEVICE — DEV COND GAS LAP INSUFLOW W/LUER CONN

## (undated) DEVICE — DRAPE,REIN 53X77,STERILE: Brand: MEDLINE

## (undated) DEVICE — HARMONIC ACE +7 LAPAROSCOPIC SHEARS ADVANCED HEMOSTASIS 5MM DIAMETER 36CM SHAFT LENGTH  FOR USE WITH GRAY HAND PIECE ONLY: Brand: HARMONIC ACE

## (undated) DEVICE — CONTN STRL 32OZ

## (undated) DEVICE — ADAPT CLN BIOGUARD AIR/H2O DISP

## (undated) DEVICE — SYR CONTRL LUERLOK 10CC

## (undated) DEVICE — COUNT NDL MAG FM/BLCK 40COUNT

## (undated) DEVICE — 3M™ STERI-DRAPE™ INSTRUMENT POUCH 1018L: Brand: STERI-DRAPE™

## (undated) DEVICE — THE TORRENT IRRIGATION SCOPE CONNECTOR IS USED WITH THE TORRENT IRRIGATION TUBING TO PROVIDE IRRIGATION FLUIDS SUCH AS STERILE WATER DURING GASTROINTESTINAL ENDOSCOPIC PROCEDURES WHEN USED IN CONJUNCTION WITH AN IRRIGATION PUMP (OR ELECTROSURGICAL UNIT).: Brand: TORRENT

## (undated) DEVICE — RUBBERBAND LF STRL PK/2

## (undated) DEVICE — KT CLN CLEANOR SCPE

## (undated) DEVICE — DRSNG WND BORDR/ADHS NONADHR/GZ LF 2X2IN STRL

## (undated) DEVICE — SHEARS WITH ROTICULATOR TECHNOLOGY: Brand: ENDO MINI-SHEARS

## (undated) DEVICE — SYRINGE,EAR/ULCER, RED, 2 OZ, STERILE: Brand: MEDLINE

## (undated) DEVICE — TUBING, SUCTION, 1/4" X 10', STRAIGHT: Brand: MEDLINE

## (undated) DEVICE — SYR LL TP 10ML STRL

## (undated) DEVICE — NEEDLE, QUINCKE, 18GX3.5": Brand: MEDLINE

## (undated) DEVICE — LN SMPL CO2 SHTRM SD STREAM W/M LUER

## (undated) DEVICE — LAPAROSCOPIC SMOKE ELIMINATION DEVICE: Brand: PNEUVIEW XE

## (undated) DEVICE — ENDOPATH XCEL BLADELESS TROCARS WITH STABILITY SLEEVES: Brand: ENDOPATH XCEL

## (undated) DEVICE — SUT VIC 0 TIES 18IN J912G

## (undated) DEVICE — DRP Z/FRICTION 10X16IN

## (undated) DEVICE — SUT PROLN 1 CT1 30IN 8425H

## (undated) DEVICE — SUT VIC 2/0 CT2 27IN J269H

## (undated) DEVICE — INTENDED FOR TISSUE SEPARATION, AND OTHER PROCEDURES THAT REQUIRE A SHARP SURGICAL BLADE TO PUNCTURE OR CUT.: Brand: BARD-PARKER ® CARBON RIB-BACK BLADES

## (undated) DEVICE — ENDOPATH PNEUMONEEDLE INSUFFLATION NEEDLES WITH LUER LOCK CONNECTORS 120MM: Brand: ENDOPATH

## (undated) DEVICE — ANTIBACTERIAL UNDYED BRAIDED (POLYGLACTIN 910), SYNTHETIC ABSORBABLE SUTURE: Brand: COATED VICRYL

## (undated) DEVICE — DISPOSABLE MONOPOLAR ENDOSCOPIC CORD 10 FT. (3M): Brand: KIRWAN

## (undated) DEVICE — CANN O2 ETCO2 FITS ALL CONN CO2 SMPL A/ 7IN DISP LF

## (undated) DEVICE — SINGLE BASIN: Brand: CARDINAL HEALTH

## (undated) DEVICE — TUBING, SUCTION, 1/4" X 20', STRAIGHT: Brand: MEDLINE INDUSTRIES, INC.

## (undated) DEVICE — VAGINAL PACKING: Brand: DEROYAL

## (undated) DEVICE — MAT FLR ABS LIQUILOC 28X56IN PNK

## (undated) DEVICE — DRAPE,UNDERBUTTOCKS,PCH,STERILE: Brand: MEDLINE

## (undated) DEVICE — NDL HYPO PRECISIONGLIDE REG 25G 1 1/2

## (undated) DEVICE — GLV SURG BIOGEL LTX PF 6 1/2

## (undated) DEVICE — SINGLE-USE BIOPSY FORCEPS: Brand: RADIAL JAW 4

## (undated) DEVICE — 1010 S-DRAPE TOWEL DRAPE 10/BX: Brand: STERI-DRAPE™

## (undated) DEVICE — TROCAR: Brand: KII OPTICAL ACCESS SYSTEM